# Patient Record
Sex: FEMALE | Race: WHITE | NOT HISPANIC OR LATINO | Employment: OTHER | URBAN - METROPOLITAN AREA
[De-identification: names, ages, dates, MRNs, and addresses within clinical notes are randomized per-mention and may not be internally consistent; named-entity substitution may affect disease eponyms.]

---

## 2017-04-19 ENCOUNTER — OFFICE VISIT (OUTPATIENT)
Dept: FAMILY MEDICINE CLINIC | Facility: CLINIC | Age: 72
End: 2017-04-19

## 2017-04-19 VITALS
SYSTOLIC BLOOD PRESSURE: 121 MMHG | BODY MASS INDEX: 30.22 KG/M2 | HEART RATE: 66 BPM | TEMPERATURE: 97.7 F | RESPIRATION RATE: 16 BRPM | HEIGHT: 64 IN | WEIGHT: 177 LBS | DIASTOLIC BLOOD PRESSURE: 77 MMHG

## 2017-04-19 DIAGNOSIS — I10 ESSENTIAL HYPERTENSION: ICD-10-CM

## 2017-04-19 PROCEDURE — 99213 OFFICE O/P EST LOW 20 MIN: CPT | Performed by: FAMILY MEDICINE

## 2017-04-19 RX ORDER — AMLODIPINE BESYLATE AND BENAZEPRIL HYDROCHLORIDE 5; 20 MG/1; MG/1
1 CAPSULE ORAL DAILY
Qty: 90 CAPSULE | Refills: 3 | Status: SHIPPED | OUTPATIENT
Start: 2017-04-19 | End: 2018-05-08 | Stop reason: SDUPTHER

## 2017-04-19 NOTE — PROGRESS NOTES
"Subjective   Minnie Ross is a 71 y.o. female.     History of Present Illness     Chief Complaint:   Chief Complaint   Patient presents with   • Hypertension     late / med refill -no labs        Minnie Ross 71 y.o. female who presents today for Medical Management of the below listed issues and medication refills.  she has a history of   Patient Active Problem List   Diagnosis   • Hypertension   • Hyperlipidemia   • Eczema   .  Since the last visit, she has overall felt well.  she has been compliant with   Current Outpatient Prescriptions:   •  amLODIPine-benazepril (LOTREL 5-20) 5-20 MG per capsule, Take 1 capsule by mouth Daily., Disp: 90 capsule, Rfl: 3  •  MULTIPLE VITAMINS-MINERALS ER PO, Take  by mouth., Disp: , Rfl: .  she denies medication side effects.    All of the chronic condition(s) listed above are stable w/o issues.    /77  Pulse 66  Temp 97.7 °F (36.5 °C) (Oral)   Resp 16  Ht 63.5\" (161.3 cm)  Wt 177 lb (80.3 kg)  BMI 30.86 kg/m2    Results for orders placed or performed in visit on 12/29/16   POC Urinalysis Dipstick   Result Value Ref Range    Color Yellow Yellow, Straw, Dark Yellow, Luann    Clarity, UA Clear Clear    Glucose, UA Negative Negative, 1000 mg/dL (3+) mg/dL    Bilirubin Negative Negative    Ketones, UA Negative Negative    Specific Gravity  1.010 1.005 - 1.030    Blood, UA Trace (A) Negative    pH, Urine 5.5 5.0 - 8.0    Protein, POC Negative Negative mg/dL    Urobilinogen, UA Normal Normal    Leukocytes Small (1+) (A) Negative    Nitrite, UA Negative Negative   IGP, Rfx Aptima HPV ASCU   Result Value Ref Range    Diagnosis Comment     Specimen adequacy: Comment     Clinician Provided ICD-10: Comment     Performed by: Comment     QC reviewed by: Comment     . .     Note: Comment     Method: CANCELED     Conv .conv Comment          The following portions of the patient's history were reviewed and updated as appropriate: allergies, current medications, past family history, " past medical history, past social history, past surgical history and problem list.    Review of Systems   Constitutional: Negative for activity change, chills, fatigue and fever.   Respiratory: Negative for cough and chest tightness.    Cardiovascular: Negative for chest pain and palpitations.   Gastrointestinal: Negative for abdominal pain and nausea.   Endocrine: Negative for cold intolerance.   Psychiatric/Behavioral: Negative for behavioral problems and dysphoric mood.       Objective   Physical Exam   Constitutional: She appears well-developed and well-nourished.   Neck: Neck supple. No thyromegaly present.   Cardiovascular: Normal rate and regular rhythm.    No murmur heard.  Pulmonary/Chest: Effort normal and breath sounds normal.   Abdominal: Bowel sounds are normal.   Psychiatric: She has a normal mood and affect. Her behavior is normal.   Nursing note and vitals reviewed.      Assessment/Plan   Minnie was seen today for hypertension.    Diagnoses and all orders for this visit:    Essential hypertension  -     amLODIPine-benazepril (LOTREL 5-20) 5-20 MG per capsule; Take 1 capsule by mouth Daily.

## 2017-11-07 ENCOUNTER — APPOINTMENT (OUTPATIENT)
Dept: WOMENS IMAGING | Facility: HOSPITAL | Age: 72
End: 2017-11-07

## 2017-11-07 PROCEDURE — G0202 SCR MAMMO BI INCL CAD: HCPCS | Performed by: RADIOLOGY

## 2017-11-07 PROCEDURE — 77063 BREAST TOMOSYNTHESIS BI: CPT | Performed by: RADIOLOGY

## 2017-11-09 ENCOUNTER — TELEPHONE (OUTPATIENT)
Dept: OBSTETRICS AND GYNECOLOGY | Facility: CLINIC | Age: 72
End: 2017-11-09

## 2017-11-09 NOTE — TELEPHONE ENCOUNTER
Progress Notes      Call pt:  Mammogram is normal/benign     Stable mass.      Osmany Ross MD

## 2018-01-10 ENCOUNTER — OFFICE VISIT (OUTPATIENT)
Dept: OBSTETRICS AND GYNECOLOGY | Facility: CLINIC | Age: 73
End: 2018-01-10

## 2018-01-10 VITALS
HEIGHT: 55 IN | DIASTOLIC BLOOD PRESSURE: 82 MMHG | WEIGHT: 173.2 LBS | BODY MASS INDEX: 40.08 KG/M2 | SYSTOLIC BLOOD PRESSURE: 122 MMHG

## 2018-01-10 DIAGNOSIS — M80.08XA AGE-RELATED OSTEOPOROSIS WITH CURRENT PATHOLOGICAL FRACTURE OF VERTEBRA, INITIAL ENCOUNTER (HCC): ICD-10-CM

## 2018-01-10 DIAGNOSIS — Z01.419 GYNECOLOGIC EXAM NORMAL: Primary | ICD-10-CM

## 2018-01-10 PROCEDURE — G0101 CA SCREEN;PELVIC/BREAST EXAM: HCPCS | Performed by: NURSE PRACTITIONER

## 2018-01-10 NOTE — PROGRESS NOTES
Minnie Ross is a 72 y.o. female.   Chief Complaint   Patient presents with   • Gynecologic Exam     Patient is here for a annual.      HPI:pt here for annual  Exam, voices no c/o    The following portions of the patient's history were reviewed and updated as appropriate: allergies, current medications, past family history, past medical history, past social history, past surgical history and problem list.    Review of Systems  Review of Systems   Constitutional: Negative.  Negative for unexpected weight change.   Respiratory: Negative for chest tightness and shortness of breath.    Cardiovascular: Negative for chest pain and palpitations.   Gastrointestinal: Negative for abdominal pain and blood in stool.   Endocrine: Negative.    Genitourinary: Negative for dyspareunia, dysuria, frequency, hematuria, menstrual problem, pelvic pain, vaginal bleeding, vaginal discharge and vaginal pain.   Musculoskeletal: Negative for joint swelling.   Skin: Negative for color change, rash and wound.   Allergic/Immunologic: Negative.    Psychiatric/Behavioral: Negative.    All other systems reviewed and are negative.      Objective   Physical Exam   Constitutional: She is oriented to person, place, and time. She appears well-developed and well-nourished.   HENT:   Head: Normocephalic.   Neck: Normal range of motion.   Cardiovascular: Normal rate and regular rhythm.    Pulmonary/Chest: Effort normal and breath sounds normal. Right breast exhibits no mass and no nipple discharge. Left breast exhibits no mass and no nipple discharge. There is no breast swelling.   Breasts soft without palpable masses   Abdominal: Soft. Bowel sounds are normal.   Genitourinary: Vagina normal and uterus normal. There is no rash or lesion on the right labia. There is no rash or lesion on the left labia. Cervix exhibits no friability. Right adnexum displays no mass, no tenderness and no fullness. Left adnexum displays no mass, no tenderness and no  fullness.   Genitourinary Comments: Ovaries  Within normal limits. Cervix  Within normal limits 2 small whtish area  Near introitus   Neurological: She is alert and oriented to person, place, and time.   Skin: Skin is warm and dry.   Psychiatric: She has a normal mood and affect. Her behavior is normal.   Vitals reviewed.      Assessment/Plan   Patient Instructions   Pt. Counseled today on safe sex practices, self breast examinations discussed. Colonoscopy recommended.  Bone Density Test recommended.  Hormone replacement therapy discussed. Aspirin prophylaxis to reduce risk of stroke.  Calcium and Vitamin D requirements discussed.   Diet and exercise also counseled.       Minnie was seen today for gynecologic exam.    Diagnoses and all orders for this visit:    Gynecologic exam normal        Return in about 6 months (around 7/10/2018).

## 2018-03-01 ENCOUNTER — TELEPHONE (OUTPATIENT)
Dept: FAMILY MEDICINE CLINIC | Facility: CLINIC | Age: 73
End: 2018-03-01

## 2018-03-01 DIAGNOSIS — I10 ESSENTIAL HYPERTENSION: Primary | ICD-10-CM

## 2018-03-01 DIAGNOSIS — E78.49 OTHER HYPERLIPIDEMIA: ICD-10-CM

## 2018-03-01 NOTE — TELEPHONE ENCOUNTER
----- Message from Silvia Smith sent at 3/1/2018 10:47 AM EST -----  Regarding: LAB ORDERS  Patient has f/u appt with Dr Nielson on 5/8/18 and she needs lab orders put in. Thanks

## 2018-03-26 ENCOUNTER — TRANSCRIBE ORDERS (OUTPATIENT)
Dept: ADMINISTRATIVE | Facility: HOSPITAL | Age: 73
End: 2018-03-26

## 2018-03-26 DIAGNOSIS — N95.1 SYMPTOMATIC MENOPAUSAL OR FEMALE CLIMACTERIC STATES: Primary | ICD-10-CM

## 2018-03-27 ENCOUNTER — HOSPITAL ENCOUNTER (OUTPATIENT)
Dept: BONE DENSITY | Facility: HOSPITAL | Age: 73
Discharge: HOME | End: 2018-03-27
Admitting: NURSE PRACTITIONER

## 2018-03-27 DIAGNOSIS — N95.1 SYMPTOMATIC MENOPAUSAL OR FEMALE CLIMACTERIC STATES: ICD-10-CM

## 2018-03-27 PROCEDURE — 77080 DXA BONE DENSITY AXIAL: CPT

## 2018-05-02 LAB
ALBUMIN SERPL-MCNC: 4.7 G/DL (ref 3.5–5.2)
ALBUMIN/GLOB SERPL: 1.7 G/DL
ALP SERPL-CCNC: 99 U/L (ref 39–117)
ALT SERPL-CCNC: 31 U/L (ref 1–33)
AST SERPL-CCNC: 31 U/L (ref 1–32)
BASOPHILS # BLD AUTO: 0.02 10*3/MM3 (ref 0–0.2)
BASOPHILS NFR BLD AUTO: 0.3 % (ref 0–1.5)
BILIRUB SERPL-MCNC: 0.5 MG/DL (ref 0.1–1.2)
BUN SERPL-MCNC: 18 MG/DL (ref 8–23)
BUN/CREAT SERPL: 20.7 (ref 7–25)
CALCIUM SERPL-MCNC: 10 MG/DL (ref 8.6–10.5)
CHLORIDE SERPL-SCNC: 104 MMOL/L (ref 98–107)
CHOLEST SERPL-MCNC: 207 MG/DL (ref 0–200)
CO2 SERPL-SCNC: 25.1 MMOL/L (ref 22–29)
CREAT SERPL-MCNC: 0.87 MG/DL (ref 0.57–1)
EOSINOPHIL # BLD AUTO: 0.11 10*3/MM3 (ref 0–0.7)
EOSINOPHIL NFR BLD AUTO: 1.7 % (ref 0.3–6.2)
ERYTHROCYTE [DISTWIDTH] IN BLOOD BY AUTOMATED COUNT: 13.1 % (ref 11.7–13)
GFR SERPLBLD CREATININE-BSD FMLA CKD-EPI: 64 ML/MIN/1.73
GFR SERPLBLD CREATININE-BSD FMLA CKD-EPI: 78 ML/MIN/1.73
GLOBULIN SER CALC-MCNC: 2.7 GM/DL
GLUCOSE SERPL-MCNC: 96 MG/DL (ref 65–99)
HCT VFR BLD AUTO: 44.6 % (ref 35.6–45.5)
HDLC SERPL-MCNC: 66 MG/DL (ref 40–60)
HGB BLD-MCNC: 14.6 G/DL (ref 11.9–15.5)
IMM GRANULOCYTES # BLD: 0.01 10*3/MM3 (ref 0–0.03)
IMM GRANULOCYTES NFR BLD: 0.2 % (ref 0–0.5)
LDLC SERPL CALC-MCNC: 125 MG/DL (ref 0–100)
LDLC/HDLC SERPL: 1.89 {RATIO}
LYMPHOCYTES # BLD AUTO: 2.27 10*3/MM3 (ref 0.9–4.8)
LYMPHOCYTES NFR BLD AUTO: 35.7 % (ref 19.6–45.3)
MCH RBC QN AUTO: 31.9 PG (ref 26.9–32)
MCHC RBC AUTO-ENTMCNC: 32.7 G/DL (ref 32.4–36.3)
MCV RBC AUTO: 97.6 FL (ref 80.5–98.2)
MONOCYTES # BLD AUTO: 0.52 10*3/MM3 (ref 0.2–1.2)
MONOCYTES NFR BLD AUTO: 8.2 % (ref 5–12)
NEUTROPHILS # BLD AUTO: 3.44 10*3/MM3 (ref 1.9–8.1)
NEUTROPHILS NFR BLD AUTO: 54.1 % (ref 42.7–76)
PLATELET # BLD AUTO: 363 10*3/MM3 (ref 140–500)
POTASSIUM SERPL-SCNC: 4.9 MMOL/L (ref 3.5–5.2)
PROT SERPL-MCNC: 7.4 G/DL (ref 6–8.5)
RBC # BLD AUTO: 4.57 10*6/MM3 (ref 3.9–5.2)
SODIUM SERPL-SCNC: 143 MMOL/L (ref 136–145)
TRIGL SERPL-MCNC: 82 MG/DL (ref 0–150)
TSH SERPL DL<=0.005 MIU/L-ACNC: 1.17 MIU/ML (ref 0.27–4.2)
VLDLC SERPL CALC-MCNC: 16.4 MG/DL (ref 5–40)
WBC # BLD AUTO: 6.36 10*3/MM3 (ref 4.5–10.7)

## 2018-05-08 NOTE — PROGRESS NOTES
"Subjective   Minnie Ross is a 72 y.o. female.     History of Present Illness     Chief Complaint:   Chief Complaint   Patient presents with   • Other     MEDICARE WELLNESS   • Hypertension     med refill - lab results       Minnie Ross 72 y.o. female who presents today for Medical Management of the below listed issues and medication refills.  she has a problem list of   Patient Active Problem List   Diagnosis   • Hypertension   • Hyperlipidemia   • Eczema   .  Since the last visit, she has overall felt well.  she has been compliant with   Current Outpatient Prescriptions:   •  amLODIPine-benazepril (LOTREL 5-20) 5-20 MG per capsule, Take 1 capsule by mouth Daily., Disp: 90 capsule, Rfl: 3  •  BIOTIN PO, Take  by mouth., Disp: , Rfl:   •  meloxicam (MOBIC) 15 MG tablet, , Disp: , Rfl:   •  MULTIPLE VITAMINS-MINERALS ER PO, Take  by mouth., Disp: , Rfl:   •  TURMERIC PO, Take  by mouth., Disp: , Rfl: .  she denies medication side effects.    All of the chronic condition(s) listed above are stable w/o issues.    /78   Pulse 66   Temp 97.6 °F (36.4 °C) (Oral)   Resp 16   Ht 160 cm (63\")   Wt 81.2 kg (179 lb)   BMI 31.71 kg/m²     Results for orders placed or performed in visit on 03/01/18   Comprehensive metabolic panel   Result Value Ref Range    Glucose 96 65 - 99 mg/dL    BUN 18 8 - 23 mg/dL    Creatinine 0.87 0.57 - 1.00 mg/dL    eGFR Non African Am 64 >60 mL/min/1.73    eGFR African Am 78 >60 mL/min/1.73    BUN/Creatinine Ratio 20.7 7.0 - 25.0    Sodium 143 136 - 145 mmol/L    Potassium 4.9 3.5 - 5.2 mmol/L    Chloride 104 98 - 107 mmol/L    Total CO2 25.1 22.0 - 29.0 mmol/L    Calcium 10.0 8.6 - 10.5 mg/dL    Total Protein 7.4 6.0 - 8.5 g/dL    Albumin 4.70 3.50 - 5.20 g/dL    Globulin 2.7 gm/dL    A/G Ratio 1.7 g/dL    Total Bilirubin 0.5 0.1 - 1.2 mg/dL    Alkaline Phosphatase 99 39 - 117 U/L    AST (SGOT) 31 1 - 32 U/L    ALT (SGPT) 31 1 - 33 U/L   Lipid Panel With LDL/HDL Ratio   Result " Value Ref Range    Total Cholesterol 207 (H) 0 - 200 mg/dL    Triglycerides 82 0 - 150 mg/dL    HDL Cholesterol 66 (H) 40 - 60 mg/dL    VLDL Cholesterol 16.4 5 - 40 mg/dL    LDL Cholesterol  125 (H) 0 - 100 mg/dL    LDL/HDL Ratio 1.89    TSH   Result Value Ref Range    TSH 1.170 0.270 - 4.200 mIU/mL   CBC and Differential   Result Value Ref Range    WBC 6.36 4.50 - 10.70 10*3/mm3    RBC 4.57 3.90 - 5.20 10*6/mm3    Hemoglobin 14.6 11.9 - 15.5 g/dL    Hematocrit 44.6 35.6 - 45.5 %    MCV 97.6 80.5 - 98.2 fL    MCH 31.9 26.9 - 32.0 pg    MCHC 32.7 32.4 - 36.3 g/dL    RDW 13.1 (H) 11.7 - 13.0 %    Platelets 363 140 - 500 10*3/mm3    Neutrophil Rel % 54.1 42.7 - 76.0 %    Lymphocyte Rel % 35.7 19.6 - 45.3 %    Monocyte Rel % 8.2 5.0 - 12.0 %    Eosinophil Rel % 1.7 0.3 - 6.2 %    Basophil Rel % 0.3 0.0 - 1.5 %    Neutrophils Absolute 3.44 1.90 - 8.10 10*3/mm3    Lymphocytes Absolute 2.27 0.90 - 4.80 10*3/mm3    Monocytes Absolute 0.52 0.20 - 1.20 10*3/mm3    Eosinophils Absolute 0.11 0.00 - 0.70 10*3/mm3    Basophils Absolute 0.02 0.00 - 0.20 10*3/mm3    Immature Granulocyte Rel % 0.2 0.0 - 0.5 %    Immature Grans Absolute 0.01 0.00 - 0.03 10*3/mm3           The following portions of the patient's history were reviewed and updated as appropriate: allergies, current medications, past family history, past medical history, past social history, past surgical history and problem list.    Review of Systems   Constitutional: Negative for activity change, chills, fatigue and fever.   Respiratory: Negative for cough and chest tightness.    Cardiovascular: Negative for chest pain and palpitations.   Gastrointestinal: Negative for abdominal pain and nausea.   Endocrine: Negative for cold intolerance.   Psychiatric/Behavioral: Negative for behavioral problems and dysphoric mood.       Objective   Physical Exam   Constitutional: She appears well-developed and well-nourished.   Neck: Neck supple. No thyromegaly present.    Cardiovascular: Normal rate and regular rhythm.    No murmur heard.  Pulmonary/Chest: Effort normal and breath sounds normal.   Abdominal: Bowel sounds are normal.   Psychiatric: She has a normal mood and affect. Her behavior is normal.   Nursing note and vitals reviewed.  Labs reviewed with pt today during visit. All questions answered.      Assessment/Plan   Minnie was seen today for other and hypertension.    Diagnoses and all orders for this visit:    Medicare annual wellness visit, subsequent    Essential hypertension  -     amLODIPine-benazepril (LOTREL 5-20) 5-20 MG per capsule; Take 1 capsule by mouth Daily.

## 2018-05-09 ENCOUNTER — OFFICE VISIT (OUTPATIENT)
Dept: FAMILY MEDICINE CLINIC | Facility: CLINIC | Age: 73
End: 2018-05-09

## 2018-05-09 VITALS
RESPIRATION RATE: 16 BRPM | HEIGHT: 63 IN | BODY MASS INDEX: 31.71 KG/M2 | SYSTOLIC BLOOD PRESSURE: 126 MMHG | DIASTOLIC BLOOD PRESSURE: 78 MMHG | HEART RATE: 66 BPM | WEIGHT: 179 LBS | TEMPERATURE: 97.6 F

## 2018-05-09 DIAGNOSIS — Z00.00 MEDICARE ANNUAL WELLNESS VISIT, SUBSEQUENT: Primary | ICD-10-CM

## 2018-05-09 DIAGNOSIS — I10 ESSENTIAL HYPERTENSION: ICD-10-CM

## 2018-05-09 PROCEDURE — 99213 OFFICE O/P EST LOW 20 MIN: CPT | Performed by: FAMILY MEDICINE

## 2018-05-09 PROCEDURE — G0439 PPPS, SUBSEQ VISIT: HCPCS | Performed by: FAMILY MEDICINE

## 2018-05-09 RX ORDER — MELOXICAM 15 MG/1
TABLET ORAL
COMMUNITY
Start: 2018-04-20 | End: 2019-05-07 | Stop reason: SDUPTHER

## 2018-05-09 RX ORDER — AMLODIPINE BESYLATE AND BENAZEPRIL HYDROCHLORIDE 5; 20 MG/1; MG/1
1 CAPSULE ORAL DAILY
Qty: 90 CAPSULE | Refills: 3 | Status: SHIPPED | OUTPATIENT
Start: 2018-05-09 | End: 2019-05-07 | Stop reason: SDUPTHER

## 2018-05-09 NOTE — PROGRESS NOTES
QUICK REFERENCE INFORMATION:  The ABCs of the Annual Wellness Visit    Subsequent Medicare Wellness Visit    HEALTH RISK ASSESSMENT    1945    Recent Hospitalizations:  No hospitalization(s) within the last year..        Current Medical Providers:  Patient Care Team:  Andriy Nielson MD as PCP - General (Family Medicine)  Minnie Colindres MD as PCP - Claims Attributed        Smoking Status:  History   Smoking Status   • Former Smoker   Smokeless Tobacco   • Never Used       Alcohol Consumption:  History   Alcohol Use   • Yes     Comment: occasionally       Depression Screen:   PHQ-2/PHQ-9 Depression Screening 5/9/2018   Little interest or pleasure in doing things 0   Feeling down, depressed, or hopeless 0   Trouble falling or staying asleep, or sleeping too much -   Feeling tired or having little energy -   Poor appetite or overeating -   Feeling bad about yourself - or that you are a failure or have let yourself or your family down -   Trouble concentrating on things, such as reading the newspaper or watching television -   Moving or speaking so slowly that other people could have noticed. Or the opposite - being so fidgety or restless that you have been moving around a lot more than usual -   Thoughts that you would be better off dead, or of hurting yourself in some way -   Total Score 0       Health Habits and Functional and Cognitive Screening:  Functional & Cognitive Status 5/9/2018   Do you have difficulty preparing food and eating? No   Do you have difficulty bathing yourself, getting dressed or grooming yourself? No   Do you have difficulty using the toilet? No   Do you have difficulty moving around from place to place? No   Do you have trouble with steps or getting out of a bed or a chair? No   In the past year have you fallen or experienced a near fall? No   Current Diet Well Balanced Diet   Dental Exam Up to date   Eye Exam Up to date   Exercise (times per week) 3 times per week   Current Exercise  Activities Include Stationary Bicycling/Spin Class   Do you need help using the phone?  No   Are you deaf or do you have serious difficulty hearing?  No   Do you need help with transportation? No   Do you need help shopping? No   Do you need help preparing meals?  No   Do you need help with housework?  No   Do you need help with laundry? No   Do you need help taking your medications? No   Do you need help managing money? No   Do you ever drive or ride in a car without wearing a seat belt? No   Have you felt unusual stress, anger or loneliness in the last month? No   Who do you live with? Spouse   If you need help, do you have trouble finding someone available to you? Yes   Have you been bothered in the last four weeks by sexual problems? No   Do you have difficulty concentrating, remembering or making decisions? No           Does the patient have evidence of cognitive impairment? No    Aspirin use counseling: Does not need ASA (and currently is not on it)      Recent Lab Results:  CMP:  Lab Results   Component Value Date    GLU 96 05/02/2018    BUN 18 05/02/2018    CREATININE 0.87 05/02/2018    EGFRIFNONA 64 05/02/2018    EGFRIFAFRI 78 05/02/2018    BCR 20.7 05/02/2018     05/02/2018    K 4.9 05/02/2018    CO2 25.1 05/02/2018    CALCIUM 10.0 05/02/2018    PROTENTOTREF 7.4 05/02/2018    ALBUMIN 4.70 05/02/2018    LABGLOBREF 2.7 05/02/2018    LABIL2 1.7 05/02/2018    BILITOT 0.5 05/02/2018    ALKPHOS 99 05/02/2018    AST 31 05/02/2018    ALT 31 05/02/2018     Lipid Panel:  Lab Results   Component Value Date    TRIG 82 05/02/2018    HDL 66 (H) 05/02/2018    VLDL 16.4 05/02/2018    LDLHDL 1.89 05/02/2018     HbA1c:       Visual Acuity:  No exam data present    Age-appropriate Screening Schedule:  Refer to the list below for future screening recommendations based on patient's age, sex and/or medical conditions. Orders for these recommended tests are listed in the plan section. The patient has been provided with a  "written plan.    Health Maintenance   Topic Date Due   • PNEUMOCOCCAL VACCINES (65+ LOW/MEDIUM RISK) (2 of 2 - PPSV23) 10/29/2016   • INFLUENZA VACCINE  08/01/2018   • LIPID PANEL  05/02/2019   • MAMMOGRAM  11/07/2019   • PAP SMEAR  01/10/2020   • COLONOSCOPY  03/02/2020   • DXA SCAN  03/27/2020   • TDAP/TD VACCINES (2 - Td) 05/01/2022   • ZOSTER VACCINE  Completed        Subjective   History of Present Illness    Minnie Ross is a 72 y.o. female who presents for an Subsequent Wellness Visit.    The following portions of the patient's history were reviewed and updated as appropriate: allergies, current medications, past family history, past medical history, past social history, past surgical history and problem list.    Outpatient Medications Prior to Visit   Medication Sig Dispense Refill   • amLODIPine-benazepril (LOTREL 5-20) 5-20 MG per capsule Take 1 capsule by mouth Daily. 90 capsule 3   • BIOTIN PO Take  by mouth.     • MULTIPLE VITAMINS-MINERALS ER PO Take  by mouth.     • TURMERIC PO Take  by mouth.       No facility-administered medications prior to visit.        Patient Active Problem List   Diagnosis   • Hypertension   • Hyperlipidemia   • Eczema       Advance Care Planning:  has an advance directive - a copy HAS NOT been provided    Identification of Risk Factors:  Risk factors include: cardiovascular risk.    Review of Systems    Compared to one year ago, the patient feels her physical health is the same.  Compared to one year ago, the patient feels her mental health is the same.    Objective     Physical Exam    Vitals:    05/09/18 1002   BP: 126/78   Pulse: 66   Resp: 16   Temp: 97.6 °F (36.4 °C)   TempSrc: Oral   Weight: 81.2 kg (179 lb)   Height: 160 cm (63\")   PainSc: 0-No pain       Patient's Body mass index is 31.71 kg/m². BMI is above normal parameters. Recommendations include: exercise counseling and nutrition counseling.      Assessment/Plan   Patient Self-Management and Personalized " Health Advice  The patient has been provided with information about: diet, exercise and weight management and preventive services including:   · Exercise counseling provided, Fall Risk assessment done, Nutrition counseling provided.    Visit Diagnoses:    ICD-10-CM ICD-9-CM   1. Medicare annual wellness visit, subsequent Z00.00 V70.0   2. Essential hypertension I10 401.9       No orders of the defined types were placed in this encounter.      Outpatient Encounter Prescriptions as of 5/9/2018   Medication Sig Dispense Refill   • amLODIPine-benazepril (LOTREL 5-20) 5-20 MG per capsule Take 1 capsule by mouth Daily. 90 capsule 3   • BIOTIN PO Take  by mouth.     • MULTIPLE VITAMINS-MINERALS ER PO Take  by mouth.     • TURMERIC PO Take  by mouth.       No facility-administered encounter medications on file as of 5/9/2018.        Reviewed use of high risk medication in the elderly: not applicable  Reviewed for potential of harmful drug interactions in the elderly: not applicable    Follow Up:  No Follow-up on file.     An After Visit Summary and PPPS with all of these plans were given to the patient.

## 2018-05-09 NOTE — PATIENT INSTRUCTIONS
Medicare Wellness  Personal Prevention Plan of Service     Date of Office Visit:  2018  Encounter Provider:  Andriy Nielson MD  Place of Service:  [facility]  Patient Name: Minnie Ross  :  1945    As part of the Medicare Wellness portion of your visit today, we are providing you with this personalized preventive plan of services (PPPS). This plan is based upon recommendations of the [place] Preventive Services Task Force (USPSTF) and the Advisory Committee on Immunization Practices (ACIP).    This lists the preventive care services that should be considered, and provides dates of when you are due. Items listed as completed are up-to-date and do not require any further intervention.    Health Maintenance   Topic Date Due   • PNEUMOCOCCAL VACCINES (65+ LOW/MEDIUM RISK) (2 of 2 - PPSV23) 10/29/2016   • INFLUENZA VACCINE  2018   • LIPID PANEL  2019   • MEDICARE ANNUAL WELLNESS  2019   • MAMMOGRAM  2019   • PAP SMEAR  01/10/2020   • COLONOSCOPY  2020   • DXA SCAN  2020   • TDAP/TD VACCINES (2 - Td) 2022   • ZOSTER VACCINE  Completed   • HEPATITIS C SCREENING  Excluded       No orders of the defined types were placed in this encounter.      No Follow-up on file.

## 2018-08-07 ENCOUNTER — OFFICE VISIT (OUTPATIENT)
Dept: OBSTETRICS AND GYNECOLOGY | Facility: CLINIC | Age: 73
End: 2018-08-07

## 2018-08-07 VITALS
WEIGHT: 179 LBS | BODY MASS INDEX: 31.71 KG/M2 | DIASTOLIC BLOOD PRESSURE: 70 MMHG | SYSTOLIC BLOOD PRESSURE: 120 MMHG | HEIGHT: 63 IN

## 2018-08-07 DIAGNOSIS — Z01.419 NORMAL PELVIC EXAM: Primary | ICD-10-CM

## 2018-08-07 PROCEDURE — 99213 OFFICE O/P EST LOW 20 MIN: CPT | Performed by: NURSE PRACTITIONER

## 2018-08-07 NOTE — PROGRESS NOTES
[facility]  Routine Annual Visit    2018    Patient: Minnie Ross          MR#:[id number]      History of Present Illness    73 y.o. female  who presents for 6 month f/u as Daksha Carcamo advised for two ? White areas near introitus. Minnie is unclear exactly what Daksha saw and her note is vague. Pelvic exam will be done today to ensure no abnormalities. Minnie is completely asymptomatic- denies vaginal pain and itching. No complaints today.    No LMP recorded. Patient is postmenopausal.  Obstetric History:  OB History      Para Term  AB Living    1 1 1     1    SAB TAB Ectopic Molar Multiple Live Births              1         Menstrual History:     No LMP recorded. Patient is postmenopausal.       Sexual History:       ________________________________________  Patient Active Problem List   Diagnosis   • Hypertension   • Hyperlipidemia   • Eczema       Past Medical History:   Diagnosis Date   • Abnormal Pap smear of cervix    • H/O complete eye exam 2018   • History of blood transfusion    • History of bone density study 2018   • Hypertension    • IBS (irritable bowel syndrome)        Past Surgical History:   Procedure Laterality Date   • APPENDECTOMY     • CHOLECYSTECTOMY     • KNEE SURGERY     • MAMMO BILATERAL     • PAP SMEAR  2016       History   Smoking Status   • Former Smoker   Smokeless Tobacco   • Never Used       Family History   Problem Relation Age of Onset   • Hypertension Mother    • Heart disease Mother    • Deep vein thrombosis Brother        Prior to Admission medications    Medication Sig Start Date End Date Taking? Authorizing Provider   amLODIPine-benazepril (LOTREL 5-20) 5-20 MG per capsule Take 1 capsule by mouth Daily. 18  Yes Andriy Nielson MD   BIOTIN PO Take  by mouth.   Yes ProviderSkyler MD   meloxicam (MOBIC) 15 MG tablet  18  Yes ProviderSkyler MD   MULTIPLE VITAMINS-MINERALS ER PO Take  by mouth.   Yes  "Provider, Historical, MD   TURMERIC PO Take  by mouth.   Yes Provider, MD Skyler     ________________________________________    The following portions of the patient's history were reviewed and updated as appropriate: allergies, current medications, past family history, past medical history, past social history, past surgical history and problem list.    Review of Systems   Constitutional: Negative for chills, fatigue and fever.   Gastrointestinal: Negative for abdominal distention and abdominal pain.   Genitourinary: Negative for decreased urine volume, difficulty urinating, dysuria, frequency, genital sores, hematuria, menstrual problem, pelvic pain, urgency, vaginal bleeding, vaginal discharge and vaginal pain.       Objective   Physical Exam    /70   Ht 160 cm (62.99\")   Wt 81.2 kg (179 lb)   BMI 31.72 kg/m²    BP Readings from Last 3 Encounters:   08/07/18 120/70   05/09/18 126/78   01/10/18 122/82      Wt Readings from Last 3 Encounters:   08/07/18 81.2 kg (179 lb)   05/09/18 81.2 kg (179 lb)   01/10/18 78.6 kg (173 lb 3.2 oz)        BMI: Estimated body mass index is 31.72 kg/m² as calculated from the following:    Height as of this encounter: 160 cm (62.99\").    Weight as of this encounter: 81.2 kg (179 lb).      Vulva: normal   Vagina: normal mucosa, normal discharge   Cervix: no cervical motion tenderness and no lesions   Uterus: normal size, mobile, non-tender or normal shape and consistency   Adnexa: no mass, fullness, tenderness     Assessment:    1. Exam totally normal today. No white areas or areas of concern noted at all on vulva, labia, vagina, or cervix. Pt reassured. Follow up in January for annual exam or sooner for problems. Minnie verbalizes understanding and is happy with this plan.    Plan:    []  Rx:   []  Mammogram request made  []  PAP done  []  Occult fecal blood test (Insure)  []  Labs:   []  GC/Chl/TV  []  DEXA scan   []  Referral for colonoscopy:           Tammie GUIDRY" GABRIELE Walsh  8/7/2018 10:02 AM

## 2018-12-11 ENCOUNTER — APPOINTMENT (OUTPATIENT)
Dept: WOMENS IMAGING | Facility: HOSPITAL | Age: 73
End: 2018-12-11

## 2018-12-11 PROCEDURE — 77063 BREAST TOMOSYNTHESIS BI: CPT | Performed by: RADIOLOGY

## 2018-12-11 PROCEDURE — 77067 SCR MAMMO BI INCL CAD: CPT | Performed by: RADIOLOGY

## 2019-01-15 ENCOUNTER — OFFICE VISIT (OUTPATIENT)
Dept: OBSTETRICS AND GYNECOLOGY | Age: 74
End: 2019-01-15

## 2019-01-15 VITALS
SYSTOLIC BLOOD PRESSURE: 120 MMHG | WEIGHT: 179.4 LBS | HEIGHT: 63 IN | DIASTOLIC BLOOD PRESSURE: 72 MMHG | BODY MASS INDEX: 31.79 KG/M2

## 2019-01-15 DIAGNOSIS — Z01.419 WELL WOMAN EXAM WITH ROUTINE GYNECOLOGICAL EXAM: Primary | ICD-10-CM

## 2019-01-15 PROCEDURE — G0101 CA SCREEN;PELVIC/BREAST EXAM: HCPCS | Performed by: NURSE PRACTITIONER

## 2019-01-15 RX ORDER — INFLUENZA A VIRUS A/MICHIGAN/45/2015 X-275 (H1N1) ANTIGEN (FORMALDEHYDE INACTIVATED), INFLUENZA A VIRUS A/SINGAPORE/INFIMH-16-0019/2016 IVR-186 (H3N2) ANTIGEN (FORMALDEHYDE INACTIVATED), AND INFLUENZA B VIRUS B/MARYLAND/15/2016 BX-69A (A B/COLORADO/6/2017-LIKE VIRUS) ANTIGEN (FORMALDEHYDE INACTIVATED) 60; 60; 60 UG/.5ML; UG/.5ML; UG/.5ML
INJECTION, SUSPENSION INTRAMUSCULAR
Refills: 0 | COMMUNITY
Start: 2018-12-06 | End: 2019-05-07

## 2019-01-15 NOTE — PROGRESS NOTES
[facility], [place], operated by [facility]  Routine Annual Visit    1/15/2019    Patient: Minnie Ross          MR#:[id number]      History of Present Illness    73 y.o. female  who presents for breast/pelvic check. Former patient of Dr. Parr. Pap negative 2016. Benign mammogram 2018 at [facility]. Osteopenia on dexa 3/2018- calcium and vit D discussed. Minnie is postmenopausal- no bleeding. No HRT. She reports she is up to date on colonoscopy. Sees Dr. Nielson for primary care. No complaints today.     No LMP recorded (lmp unknown). Patient is postmenopausal.  Obstetric History:  OB History      Para Term  AB Living    1 1 1     1    SAB TAB Ectopic Molar Multiple Live Births              1         Menstrual History:     No LMP recorded (lmp unknown). Patient is postmenopausal.       Sexual History:       ________________________________________  Patient Active Problem List   Diagnosis   • Hypertension   • Hyperlipidemia   • Eczema       Past Medical History:   Diagnosis Date   • Abnormal Pap smear of cervix    • H/O complete eye exam 2018   • History of blood transfusion    • History of bone density study 2018   • Hypertension    • IBS (irritable bowel syndrome)        Past Surgical History:   Procedure Laterality Date   • APPENDECTOMY     • CHOLECYSTECTOMY     • KNEE SURGERY     • MAMMO BILATERAL     • PAP SMEAR  2016       Social History     Tobacco Use   Smoking Status Former Smoker   Smokeless Tobacco Never Used       Family History   Problem Relation Age of Onset   • Hypertension Mother    • Heart disease Mother    • Deep vein thrombosis Brother        Prior to Admission medications    Medication Sig Start Date End Date Taking? Authorizing Provider   amLODIPine-benazepril (LOTREL 5-20) 5-20 MG per capsule Take 1 capsule by mouth Daily. 18  Yes Andriy Nielson MD   BIOTIN PO Take  by mouth.   Yes ProviderSkyler MD   FLUZONE HIGH-DOSE 0.5 ML suspension prefilled syringe  "injection ADM 0.5ML IM UTD 12/6/18  Yes Skyler Fitzgerald MD   meloxicam (MOBIC) 15 MG tablet  4/20/18  Yes Skyler Fitzgerald MD   Multiple Vitamins-Minerals (ICAPS AREDS 2 PO) Take  by mouth.   Yes Skyler Fitzgerald MD   MULTIPLE VITAMINS-MINERALS ER PO Take  by mouth.   Yes Skyler Fitzgerald MD   TURMERIC PO Take  by mouth.   Yes Skyler Fitzgerald MD       The following portions of the patient's history were reviewed and updated as appropriate: allergies, current medications, past family history, past medical history, past social history, past surgical history and problem list.    Review of Systems   Constitutional: Negative.    HENT: Negative.    Eyes: Negative for visual disturbance.   Respiratory: Negative for cough, shortness of breath and wheezing.    Cardiovascular: Negative for chest pain, palpitations and leg swelling.   Gastrointestinal: Negative for abdominal distention, abdominal pain, blood in stool, constipation, diarrhea, nausea and vomiting.   Endocrine: Negative for cold intolerance and heat intolerance.   Genitourinary: Negative for difficulty urinating, dyspareunia, dysuria, frequency, genital sores, hematuria, menstrual problem, pelvic pain, urgency, vaginal bleeding, vaginal discharge and vaginal pain.   Musculoskeletal: Negative.    Skin: Negative.    Neurological: Negative for dizziness, weakness, light-headedness, numbness and headaches.   Hematological: Negative.    Psychiatric/Behavioral: Negative.    Breasts: negative for lumps skin changes, dimpling, swelling, nipple changes/discharge bilaterally       Objective   Physical Exam    /72   Ht 160 cm (63\")   Wt 81.4 kg (179 lb 6.4 oz)   LMP  (LMP Unknown)   Breastfeeding? No   BMI 31.78 kg/m²    BP Readings from Last 3 Encounters:   01/15/19 120/72   08/07/18 120/70   05/09/18 126/78      Wt Readings from Last 3 Encounters:   01/15/19 81.4 kg (179 lb 6.4 oz)   08/07/18 81.2 kg (179 lb)   05/09/18 81.2 kg (179 " "lb)        BMI: Estimated body mass index is 31.78 kg/m² as calculated from the following:    Height as of this encounter: 160 cm (63\").    Weight as of this encounter: 81.4 kg (179 lb 6.4 oz).            General:   alert, appears stated age and cooperative   Heart: regular rate and rhythm, S1, S2 normal, no murmur, click, rub or gallop   Lungs: clear to auscultation bilaterally   Abdomen: soft, non-tender, without masses or organomegaly   Breast: inspection negative, no nipple discharge or bleeding, no masses or nodularity palpable   Vulva: normal   Vagina: normal mucosa, normal discharge, atrophic appearance    Cervix: no cervical motion tenderness and no lesions   Uterus: normal size, mobile, non-tender or normal shape and consistency   Adnexa: no mass, fullness, tenderness     Assessment:    1. Normal annual exam   2. Healthy lifestyle modifications discussed, counseled on self breast exams and bone health      Plan:    []  Rx:   []  Mammogram request made  []  PAP done  []  Occult fecal blood test (Ins[facility])  []  Labs:   []  GC/Chl/TV  []  DEXA scan   []  Referral for colonoscopy:           Tammie Walsh, APRN  1/15/2019 10:49 AM  "

## 2019-01-16 LAB
CONV .: NORMAL
CYTOLOGIST CVX/VAG CYTO: NORMAL
CYTOLOGY CVX/VAG DOC THIN PREP: NORMAL
DX ICD CODE: NORMAL
HIV 1 & 2 AB SER-IMP: NORMAL
OTHER STN SPEC: NORMAL
PATH REPORT.FINAL DX SPEC: NORMAL
STAT OF ADQ CVX/VAG CYTO-IMP: NORMAL

## 2019-01-18 ENCOUNTER — TELEPHONE (OUTPATIENT)
Dept: OBSTETRICS AND GYNECOLOGY | Age: 74
End: 2019-01-18

## 2019-01-18 NOTE — TELEPHONE ENCOUNTER
----- Message from GABRIELE Andrew sent at 1/17/2019  8:47 AM EST -----  Please inform patient her pap smear returned negative (normal). Thank you.

## 2019-01-22 ENCOUNTER — TELEPHONE (OUTPATIENT)
Dept: OBSTETRICS AND GYNECOLOGY | Age: 74
End: 2019-01-22

## 2019-04-24 ENCOUNTER — TELEPHONE (OUTPATIENT)
Dept: FAMILY MEDICINE CLINIC | Facility: CLINIC | Age: 74
End: 2019-04-24

## 2019-04-24 DIAGNOSIS — E78.49 OTHER HYPERLIPIDEMIA: Primary | ICD-10-CM

## 2019-04-24 DIAGNOSIS — I10 ESSENTIAL HYPERTENSION: ICD-10-CM

## 2019-04-24 NOTE — TELEPHONE ENCOUNTER
----- Message from Anabella Hackett sent at 4/24/2019 10:32 AM EDT -----  Regarding: LAB ORDERS  PLEASE PUT IN 1 YR LABS FROM [facility]. THANKS

## 2019-05-03 LAB
ALBUMIN SERPL-MCNC: 4.8 G/DL (ref 3.5–5.2)
ALBUMIN/GLOB SERPL: 1.5 G/DL
ALP SERPL-CCNC: 105 U/L (ref 39–117)
ALT SERPL-CCNC: 23 U/L (ref 1–33)
AST SERPL-CCNC: 25 U/L (ref 1–32)
BASOPHILS # BLD AUTO: 0.05 10*3/MM3 (ref 0–0.2)
BASOPHILS NFR BLD AUTO: 0.7 % (ref 0–1.5)
BILIRUB SERPL-MCNC: 0.5 MG/DL (ref 0.2–1.2)
BUN SERPL-MCNC: 14 MG/DL (ref 8–23)
BUN/CREAT SERPL: 18.4 (ref 7–25)
CALCIUM SERPL-MCNC: 10.5 MG/DL (ref 8.6–10.5)
CHLORIDE SERPL-SCNC: 101 MMOL/L (ref 98–107)
CHOLEST SERPL-MCNC: 201 MG/DL (ref 0–200)
CO2 SERPL-SCNC: 27.2 MMOL/L (ref 22–29)
CREAT SERPL-MCNC: 0.76 MG/DL (ref 0.57–1)
EOSINOPHIL # BLD AUTO: 0.13 10*3/MM3 (ref 0–0.4)
EOSINOPHIL NFR BLD AUTO: 1.9 % (ref 0.3–6.2)
ERYTHROCYTE [DISTWIDTH] IN BLOOD BY AUTOMATED COUNT: 12.8 % (ref 12.3–15.4)
GLOBULIN SER CALC-MCNC: 3.1 GM/DL
GLUCOSE SERPL-MCNC: 97 MG/DL (ref 65–99)
HCT VFR BLD AUTO: 47.5 % (ref 34–46.6)
HDLC SERPL-MCNC: 69 MG/DL (ref 40–60)
HGB BLD-MCNC: 15 G/DL (ref 12–15.9)
IMM GRANULOCYTES # BLD AUTO: 0.01 10*3/MM3 (ref 0–0.05)
IMM GRANULOCYTES NFR BLD AUTO: 0.1 % (ref 0–0.5)
LDLC SERPL CALC-MCNC: 118 MG/DL (ref 0–100)
LDLC/HDLC SERPL: 1.7 {RATIO}
LYMPHOCYTES # BLD AUTO: 2.16 10*3/MM3 (ref 0.7–3.1)
LYMPHOCYTES NFR BLD AUTO: 30.8 % (ref 19.6–45.3)
MCH RBC QN AUTO: 30.9 PG (ref 26.6–33)
MCHC RBC AUTO-ENTMCNC: 31.6 G/DL (ref 31.5–35.7)
MCV RBC AUTO: 97.7 FL (ref 79–97)
MONOCYTES # BLD AUTO: 0.59 10*3/MM3 (ref 0.1–0.9)
MONOCYTES NFR BLD AUTO: 8.4 % (ref 5–12)
NEUTROPHILS # BLD AUTO: 4.08 10*3/MM3 (ref 1.7–7)
NEUTROPHILS NFR BLD AUTO: 58.1 % (ref 42.7–76)
NRBC BLD AUTO-RTO: 0 /100 WBC (ref 0–0.2)
PLATELET # BLD AUTO: 436 10*3/MM3 (ref 140–450)
POTASSIUM SERPL-SCNC: 4.9 MMOL/L (ref 3.5–5.2)
PROT SERPL-MCNC: 7.9 G/DL (ref 6–8.5)
RBC # BLD AUTO: 4.86 10*6/MM3 (ref 3.77–5.28)
SODIUM SERPL-SCNC: 140 MMOL/L (ref 136–145)
TRIGL SERPL-MCNC: 72 MG/DL (ref 0–150)
TSH SERPL DL<=0.005 MIU/L-ACNC: 1.96 MIU/ML (ref 0.27–4.2)
VLDLC SERPL CALC-MCNC: 14.4 MG/DL
WBC # BLD AUTO: 7.02 10*3/MM3 (ref 3.4–10.8)

## 2019-05-07 ENCOUNTER — OFFICE VISIT (OUTPATIENT)
Dept: FAMILY MEDICINE CLINIC | Facility: CLINIC | Age: 74
End: 2019-05-07

## 2019-05-07 VITALS
TEMPERATURE: 98.2 F | HEART RATE: 66 BPM | HEIGHT: 63 IN | SYSTOLIC BLOOD PRESSURE: 123 MMHG | BODY MASS INDEX: 31.36 KG/M2 | WEIGHT: 177 LBS | RESPIRATION RATE: 14 BRPM | DIASTOLIC BLOOD PRESSURE: 72 MMHG

## 2019-05-07 DIAGNOSIS — M19.91 PRIMARY OSTEOARTHRITIS, UNSPECIFIED SITE: ICD-10-CM

## 2019-05-07 DIAGNOSIS — I10 ESSENTIAL HYPERTENSION: Primary | ICD-10-CM

## 2019-05-07 PROCEDURE — 99213 OFFICE O/P EST LOW 20 MIN: CPT | Performed by: FAMILY MEDICINE

## 2019-05-07 RX ORDER — MELOXICAM 15 MG/1
15 TABLET ORAL DAILY
Qty: 90 TABLET | Refills: 3 | Status: SHIPPED | OUTPATIENT
Start: 2019-05-07 | End: 2019-06-26

## 2019-05-07 RX ORDER — AMLODIPINE BESYLATE AND BENAZEPRIL HYDROCHLORIDE 5; 20 MG/1; MG/1
1 CAPSULE ORAL DAILY
Qty: 90 CAPSULE | Refills: 3 | Status: SHIPPED | OUTPATIENT
Start: 2019-05-07 | End: 2020-07-15 | Stop reason: SDUPTHER

## 2019-05-07 NOTE — PROGRESS NOTES
"Subjective   Minnie Ross is a 73 y.o. female.     History of Present Illness     Chief Complaint:   Chief Complaint   Patient presents with   • Hypertension     med refill - lab results med wellness due after 5/9/2019       Minnie Ross 73 y.o. female who presents today for Medical Management of the below listed issues and medication refills.  she has a problem list of   Patient Active Problem List   Diagnosis   • Hypertension   • Hyperlipidemia   • Eczema   • Primary osteoarthritis   .  Since the last visit, she has overall felt well.  she has been compliant with   Current Outpatient Medications:   •  amLODIPine-benazepril (LOTREL 5-20) 5-20 MG per capsule, Take 1 capsule by mouth Daily., Disp: 90 capsule, Rfl: 3  •  BIOTIN PO, Take  by mouth., Disp: , Rfl:   •  meloxicam (MOBIC) 15 MG tablet, Take 1 tablet by mouth Daily., Disp: 90 tablet, Rfl: 3  •  Multiple Vitamins-Minerals (ICAPS AREDS 2 PO), Take  by mouth., Disp: , Rfl:   •  MULTIPLE VITAMINS-MINERALS ER PO, Take  by mouth., Disp: , Rfl:   •  TURMERIC PO, Take  by mouth., Disp: , Rfl: .  she denies medication side effects.    All of the chronic condition(s) listed above are stable w/o issues.    /72   Pulse 66   Temp 98.2 °F (36.8 °C) (Oral)   Resp 14   Ht 160 cm (63\")   Wt 80.3 kg (177 lb)   LMP  (LMP Unknown)   BMI 31.35 kg/m²     Results for orders placed or performed in visit on 04/24/19   Comprehensive metabolic panel   Result Value Ref Range    Glucose 97 65 - 99 mg/dL    BUN 14 8 - 23 mg/dL    Creatinine 0.76 0.57 - 1.00 mg/dL    eGFR Non African Am 75 >60 mL/min/1.73    eGFR African Am 91 >60 mL/min/1.73    BUN/Creatinine Ratio 18.4 7.0 - 25.0    Sodium 140 136 - 145 mmol/L    Potassium 4.9 3.5 - 5.2 mmol/L    Chloride 101 98 - 107 mmol/L    Total CO2 27.2 22.0 - 29.0 mmol/L    Calcium 10.5 8.6 - 10.5 mg/dL    Total Protein 7.9 6.0 - 8.5 g/dL    Albumin 4.80 3.50 - 5.20 g/dL    Globulin 3.1 gm/dL    A/G Ratio 1.5 g/dL    Total " Bilirubin 0.5 0.2 - 1.2 mg/dL    Alkaline Phosphatase 105 39 - 117 U/L    AST (SGOT) 25 1 - 32 U/L    ALT (SGPT) 23 1 - 33 U/L   Lipid Panel With LDL/HDL Ratio   Result Value Ref Range    Total Cholesterol 201 (H) 0 - 200 mg/dL    Triglycerides 72 0 - 150 mg/dL    HDL Cholesterol 69 (H) 40 - 60 mg/dL    VLDL Cholesterol 14.4 mg/dL    LDL Cholesterol  118 (H) 0 - 100 mg/dL    LDL/HDL Ratio 1.70    TSH   Result Value Ref Range    TSH 1.960 0.270 - 4.200 mIU/mL   CBC and Differential   Result Value Ref Range    WBC 7.02 3.40 - 10.80 10*3/mm3    RBC 4.86 3.77 - 5.28 10*6/mm3    Hemoglobin 15.0 12.0 - 15.9 g/dL    Hematocrit 47.5 (H) 34.0 - 46.6 %    MCV 97.7 (H) 79.0 - 97.0 fL    MCH 30.9 26.6 - 33.0 pg    MCHC 31.6 31.5 - 35.7 g/dL    RDW 12.8 12.3 - 15.4 %    Platelets 436 140 - 450 10*3/mm3    Neutrophil Rel % 58.1 42.7 - 76.0 %    Lymphocyte Rel % 30.8 19.6 - 45.3 %    Monocyte Rel % 8.4 5.0 - 12.0 %    Eosinophil Rel % 1.9 0.3 - 6.2 %    Basophil Rel % 0.7 0.0 - 1.5 %    Neutrophils Absolute 4.08 1.70 - 7.00 10*3/mm3    Lymphocytes Absolute 2.16 0.70 - 3.10 10*3/mm3    Monocytes Absolute 0.59 0.10 - 0.90 10*3/mm3    Eosinophils Absolute 0.13 0.00 - 0.40 10*3/mm3    Basophils Absolute 0.05 0.00 - 0.20 10*3/mm3    Immature Granulocyte Rel % 0.1 0.0 - 0.5 %    Immature Grans Absolute 0.01 0.00 - 0.05 10*3/mm3    nRBC 0.0 0.0 - 0.2 /100 WBC           The following portions of the patient's history were reviewed and updated as appropriate: allergies, current medications, past family history, past medical history, past social history, past surgical history and problem list.    Review of Systems   Constitutional: Negative for activity change, chills, fatigue and fever.   Respiratory: Negative for cough and chest tightness.    Cardiovascular: Negative for chest pain and palpitations.   Gastrointestinal: Negative for abdominal pain and nausea.   Endocrine: Negative for cold intolerance.   Psychiatric/Behavioral: Negative  for behavioral problems and dysphoric mood.       Objective   Physical Exam   Constitutional: She appears well-developed and well-nourished.   Neck: Neck supple. No thyromegaly present.   Cardiovascular: Normal rate and regular rhythm.   No murmur heard.  Pulmonary/Chest: Effort normal and breath sounds normal.   Abdominal: Bowel sounds are normal. There is no tenderness.   Neurological: She is alert.   Psychiatric: She has a normal mood and affect. Her behavior is normal.   Nursing note and vitals reviewed.  Labs reviewed with pt today during visit. All questions answered.      Assessment/Plan   Minnie was seen today for hypertension.    Diagnoses and all orders for this visit:    Essential hypertension  -     amLODIPine-benazepril (LOTREL 5-20) 5-20 MG per capsule; Take 1 capsule by mouth Daily.    Primary osteoarthritis, unspecified site  -     meloxicam (MOBIC) 15 MG tablet; Take 1 tablet by mouth Daily.

## 2019-06-26 ENCOUNTER — OFFICE VISIT (OUTPATIENT)
Dept: FAMILY MEDICINE CLINIC | Facility: CLINIC | Age: 74
End: 2019-06-26

## 2019-06-26 VITALS
SYSTOLIC BLOOD PRESSURE: 124 MMHG | HEART RATE: 66 BPM | WEIGHT: 174 LBS | TEMPERATURE: 98.3 F | BODY MASS INDEX: 30.83 KG/M2 | HEIGHT: 63 IN | RESPIRATION RATE: 14 BRPM | DIASTOLIC BLOOD PRESSURE: 70 MMHG | OXYGEN SATURATION: 98 %

## 2019-06-26 DIAGNOSIS — Z00.00 MEDICARE ANNUAL WELLNESS VISIT, SUBSEQUENT: Primary | ICD-10-CM

## 2019-06-26 DIAGNOSIS — M54.5 CHRONIC BILATERAL LOW BACK PAIN, WITH SCIATICA PRESENCE UNSPECIFIED: ICD-10-CM

## 2019-06-26 DIAGNOSIS — G89.29 CHRONIC BILATERAL LOW BACK PAIN, WITH SCIATICA PRESENCE UNSPECIFIED: ICD-10-CM

## 2019-06-26 PROCEDURE — 72100 X-RAY EXAM L-S SPINE 2/3 VWS: CPT | Performed by: FAMILY MEDICINE

## 2019-06-26 PROCEDURE — 99213 OFFICE O/P EST LOW 20 MIN: CPT | Performed by: FAMILY MEDICINE

## 2019-06-26 PROCEDURE — G0439 PPPS, SUBSEQ VISIT: HCPCS | Performed by: FAMILY MEDICINE

## 2019-06-26 RX ORDER — CELECOXIB 200 MG/1
200 CAPSULE ORAL DAILY
Qty: 90 CAPSULE | Refills: 3 | Status: SHIPPED | OUTPATIENT
Start: 2019-06-26 | End: 2021-03-26 | Stop reason: SDUPTHER

## 2019-06-26 NOTE — PATIENT INSTRUCTIONS
Medicare Wellness  Personal Prevention Plan of Service     Date of Office Visit:  2019  Encounter Provider:  Andriy Nielson MD  Place of Service:  [facility]  Patient Name: Minnie Ross  :  1945    As part of the Medicare Wellness portion of your visit today, we are providing you with this personalized preventive plan of services (PPPS). This plan is based upon recommendations of the [place] Preventive Services Task Force (USPSTF) and the Advisory Committee on Immunization Practices (ACIP).    This lists the preventive care services that should be considered, and provides dates of when you are due. Items listed as completed are up-to-date and do not require any further intervention.    Health Maintenance   Topic Date Due   • PNEUMOCOCCAL VACCINES (65+ LOW/MEDIUM RISK) (2 of 2 - PPSV23) 10/29/2016   • ZOSTER VACCINE (2 of 2) 2020 (Originally 2015)   • INFLUENZA VACCINE  2019   • COLONOSCOPY  2020   • DXA SCAN  2020   • LIPID PANEL  2020   • MEDICARE ANNUAL WELLNESS  2020   • MAMMOGRAM  2020   • PAP SMEAR  01/15/2021   • TDAP/TD VACCINES (2 - Td) 2022   • HEPATITIS C SCREENING  Discontinued       No orders of the defined types were placed in this encounter.      Return if symptoms worsen or fail to improve.

## 2019-06-26 NOTE — PROGRESS NOTES
QUICK REFERENCE INFORMATION:  The ABCs of the Annual Wellness Visit    Subsequent Medicare Wellness Visit     HEALTH RISK ASSESSMENT    : 1945    Recent Hospitalizations:  No hospitalization(s) within the last year..  ccc      Current Medical Providers:  Patient Care Team:  Andriy Nielson MD as PCP - General (Family Medicine)  Minnie Colindres MD as PCP - Claims Attributed  Minnie Colindres MD as Consulting Physician (Dermatology)  Tammie Walsh APRN as Nurse Practitioner (Nurse Practitioner)  Blas Mai MD as Consulting Physician (Ophthalmology)        Smoking Status:  Social History     Tobacco Use   Smoking Status Former Smoker   Smokeless Tobacco Never Used       Alcohol Consumption:  Social History     Substance and Sexual Activity   Alcohol Use Yes    Comment: occasionally       Depression Screen:   PHQ-2/PHQ-9 Depression Screening 2019   Little interest or pleasure in doing things 0   Feeling down, depressed, or hopeless 0   Trouble falling or staying asleep, or sleeping too much -   Feeling tired or having little energy -   Poor appetite or overeating -   Feeling bad about yourself - or that you are a failure or have let yourself or your family down -   Trouble concentrating on things, such as reading the newspaper or watching television -   Moving or speaking so slowly that other people could have noticed. Or the opposite - being so fidgety or restless that you have been moving around a lot more than usual -   Thoughts that you would be better off dead, or of hurting yourself in some way -   Total Score 0       Health Habits and Functional and Cognitive Screening:  Functional & Cognitive Status 2019   Do you have difficulty preparing food and eating? No   Do you have difficulty bathing yourself, getting dressed or grooming yourself? No   Do you have difficulty using the toilet? No   Do you have difficulty moving around from place to place? No   Do you have trouble with steps or  getting out of a bed or a chair? No   In the past year have you fallen or experienced a near fall? No   Current Diet Well Balanced Diet   Dental Exam Up to date   Eye Exam Up to date   Exercise (times per week) 3 times per week   Current Exercise Activities Include Walking   Do you need help using the phone?  No   Are you deaf or do you have serious difficulty hearing?  No   Do you need help with transportation? No   Do you need help shopping? No   Do you need help preparing meals?  No   Do you need help with housework?  No   Do you need help with laundry? No   Do you need help taking your medications? No   Do you need help managing money? No   Do you ever drive or ride in a car without wearing a seat belt? No   Have you felt unusual stress, anger or loneliness in the last month? No   Who do you live with? Spouse   If you need help, do you have trouble finding someone available to you? Yes   Have you been bothered in the last four weeks by sexual problems? No   Do you have difficulty concentrating, remembering or making decisions? No           Does the patient have evidence of cognitive impairment? No    Asiprin use counseling: Does not need ASA (and currently is not on it)      Recent Lab Results:    Lab Results   Component Value Date    GLU 97 05/03/2019        Lab Results   Component Value Date    TRIG 72 05/03/2019    HDL 69 (H) 05/03/2019    VLDL 14.4 05/03/2019    LDLHDL 1.70 05/03/2019           Age-appropriate Screening Schedule:  Refer to the list below for future screening recommendations based on patient's age, sex and/or medical conditions. Orders for these recommended tests are listed in the plan section. The patient has been provided with a written plan.    Health Maintenance   Topic Date Due   • PNEUMOCOCCAL VACCINES (65+ LOW/MEDIUM RISK) (2 of 2 - PPSV23) 10/29/2016   • ZOSTER VACCINE (2 of 2) 05/14/2020 (Originally 2/26/2015)   • INFLUENZA VACCINE  08/01/2019   • COLONOSCOPY  03/02/2020   • DXA SCAN  " 03/27/2020   • LIPID PANEL  05/03/2020   • MAMMOGRAM  12/11/2020   • PAP SMEAR  01/15/2021   • TDAP/TD VACCINES (2 - Td) 05/01/2022        Subjective   History of Present Illness    Minnie Ross is a 73 y.o. female who presents for an Annual Wellness Visit.    The following portions of the patient's history were reviewed and updated as appropriate: allergies, current medications, past family history, past medical history, past social history, past surgical history and problem list.    Outpatient Medications Prior to Visit   Medication Sig Dispense Refill   • amLODIPine-benazepril (LOTREL 5-20) 5-20 MG per capsule Take 1 capsule by mouth Daily. 90 capsule 3   • BIOTIN PO Take  by mouth.     • Multiple Vitamins-Minerals (ICAPS AREDS 2 PO) Take  by mouth.     • MULTIPLE VITAMINS-MINERALS ER PO Take  by mouth.     • TURMERIC PO Take  by mouth.     • meloxicam (MOBIC) 15 MG tablet Take 1 tablet by mouth Daily. 90 tablet 3     No facility-administered medications prior to visit.        Patient Active Problem List   Diagnosis   • Hypertension   • Hyperlipidemia   • Eczema   • Primary osteoarthritis       Advance Care Planning:  Patient has an advance directive - a copy has been provided and is visible in patient header    Identification of Risk Factors:  Risk factors include: cardiovascular risk.    Review of Systems    Compared to one year ago, the patient feels her physical health is the same.  Compared to one year ago, the patient feels her mental health is the same.    Objective     Physical Exam    Vitals:    06/26/19 1502   BP: 124/70   Pulse: 66   Resp: 14   Temp: 98.3 °F (36.8 °C)   TempSrc: Oral   SpO2: 98%   Weight: 78.9 kg (174 lb)   Height: 160 cm (63\")   PainSc: 10-Worst pain ever       Patient's Body mass index is 30.82 kg/m². BMI is above normal parameters. Recommendations include: exercise counseling and nutrition counseling.      Assessment/Plan   Patient Self-Management and Personalized Health " Advice  The patient has been provided with information about: diet, exercise and weight management and preventive services including:   · Exercise counseling provided, Fall Risk assessment done, Nutrition counseling provided.    Visit Diagnoses:    ICD-10-CM ICD-9-CM   1. Medicare annual wellness visit, subsequent Z00.00 V70.0   2. Chronic bilateral low back pain, with sciatica presence unspecified M54.5 724.2    G89.29 338.29       Orders Placed This Encounter   Procedures   • XR Spine Lumbar 2 or 3 View     Order Specific Question:   Reason for Exam:     Answer:   chronic LBP       Outpatient Encounter Medications as of 6/26/2019   Medication Sig Dispense Refill   • amLODIPine-benazepril (LOTREL 5-20) 5-20 MG per capsule Take 1 capsule by mouth Daily. 90 capsule 3   • BIOTIN PO Take  by mouth.     • celecoxib (CeleBREX) 200 MG capsule Take 1 capsule by mouth Daily. 90 capsule 3   • Multiple Vitamins-Minerals (ICAPS AREDS 2 PO) Take  by mouth.     • MULTIPLE VITAMINS-MINERALS ER PO Take  by mouth.     • TURMERIC PO Take  by mouth.     • [DISCONTINUED] meloxicam (MOBIC) 15 MG tablet Take 1 tablet by mouth Daily. 90 tablet 3     No facility-administered encounter medications on file as of 6/26/2019.        Reviewed use of high risk medication in the elderly: not applicable  Reviewed for potential of harmful drug interactions in the elderly: not applicable    Follow Up:  Return if symptoms worsen or fail to improve.     An After Visit Summary and PPPS with all of these plans were given to the patient.

## 2019-06-26 NOTE — PROGRESS NOTES
"Subjective   Minnie Ross is a 73 y.o. female.     CC: Back Pain    History of Present Illness     Pt comes in today c/o LBP that radiates to the groin (at times). The leg and buttock discomfort has been hurting since January and moves. Was given Mobic with some help. Labs WNL. Movement is worse. \"Stiff and sore\" mainly, currently, in the buttock region.   Having some LBP with this. Denies injury.      The following portions of the patient's history were reviewed and updated as appropriate: allergies, current medications, past family history, past medical history, past social history, past surgical history and problem list.    Review of Systems   Constitutional: Negative for activity change, chills, fatigue and fever.   Respiratory: Negative for cough and chest tightness.    Cardiovascular: Negative for chest pain and palpitations.   Gastrointestinal: Negative for abdominal pain and nausea.   Endocrine: Negative for cold intolerance.   Musculoskeletal: Positive for back pain and myalgias.   Psychiatric/Behavioral: Negative for behavioral problems and dysphoric mood.       /70   Pulse 66   Temp 98.3 °F (36.8 °C) (Oral)   Resp 14   Ht 160 cm (63\")   Wt 78.9 kg (174 lb)   LMP  (LMP Unknown)   SpO2 98%   BMI 30.82 kg/m²     Objective   Physical Exam   Constitutional: She appears well-developed and well-nourished.   Neck: Neck supple. No thyromegaly present.   Cardiovascular: Normal rate and regular rhythm.   No murmur heard.  Pulmonary/Chest: Effort normal and breath sounds normal.   Abdominal: Bowel sounds are normal. There is no tenderness.   Musculoskeletal: She exhibits tenderness (L>R SI joint).   Neurological: She is alert.   Psychiatric: She has a normal mood and affect. Her behavior is normal.   Nursing note and vitals reviewed.  XR Lumbar Spine: ordered due to chronic pain, no comparison, read by me: scoliosis with DDD L5/S1    Assessment/Plan   Minnie was seen today for medicare wellness and " lower back pain.    Diagnoses and all orders for this visit:    Medicare annual wellness visit, subsequent    Chronic bilateral low back pain, with sciatica presence unspecified  -     XR Spine Lumbar 2 or 3 View  -     celecoxib (CeleBREX) 200 MG capsule; Take 1 capsule by mouth Daily.  -     Ambulatory Referral to Physical Therapy Evaluate and treat

## 2019-06-27 ENCOUNTER — TREATMENT (OUTPATIENT)
Dept: PHYSICAL THERAPY | Facility: CLINIC | Age: 74
End: 2019-06-27

## 2019-06-27 DIAGNOSIS — G89.29 CHRONIC MIDLINE LOW BACK PAIN WITH BILATERAL SCIATICA: Primary | ICD-10-CM

## 2019-06-27 DIAGNOSIS — M54.41 CHRONIC MIDLINE LOW BACK PAIN WITH BILATERAL SCIATICA: Primary | ICD-10-CM

## 2019-06-27 DIAGNOSIS — M54.42 CHRONIC MIDLINE LOW BACK PAIN WITH BILATERAL SCIATICA: Primary | ICD-10-CM

## 2019-06-27 DIAGNOSIS — S39.012D STRAIN OF LUMBAR REGION, SUBSEQUENT ENCOUNTER: ICD-10-CM

## 2019-06-27 PROCEDURE — 97161 PT EVAL LOW COMPLEX 20 MIN: CPT | Performed by: PHYSICAL THERAPIST

## 2019-06-27 PROCEDURE — 97110 THERAPEUTIC EXERCISES: CPT | Performed by: PHYSICAL THERAPIST

## 2019-06-27 NOTE — PROGRESS NOTES
Physical Therapy Initial Evaluation and Plan of Care    Patient: Minnie Ross   : 1945  Diagnosis/ICD-10 Code:  Chronic midline low back pain with bilateral sciatica [M54.41, M54.42, G89.29]  Referring practitioner: Andriy Nielson MD    Subjective Evaluation    History of Present Illness  Mechanism of injury: Pt present to PT with LBP with radicular symptoms into (B) LE.  The symptoms have been present since 2019 with (B) LE radiculopathy.  The LBP started in the last 2 weeks.  The pain was of insidious onset, but the pt was caring for her elderly mother at that time.      No Prior injury to the low back.          Occupation:  Retired -  [employer]  Activities:  Recumbent Bike 5x wk for 30 mins, Computer, walking, housework  PLOF: Independent  Medical Hx Reviewed.      Quality of life: excellent    Pain  Current pain ratin  At best pain ratin  At worst pain ratin  Location: Low Back (B) LE radiculopathy to knees  Quality: dull ache, radiating, tight, discomfort and burning  Relieving factors: rest (standing & Walking)  Aggravating factors: squatting (sitting >20 mins, initial WB, transfers)    Social Support  Lives in: multiple-level home  Lives with: spouse    Hand dominance: right    Diagnostic Tests  X-ray: abnormal (OA, )             Objective       Active Range of Motion     Lumbar   Flexion: 100 (%) degrees with pain  Extension: 50 (%) degrees with pain  Left lateral flexion: 100 (%) degrees   Right lateral flexion: 100 (%) degrees   Left rotation: 50 (%) degrees   Right rotation: 75 (%) degrees     Strength/Myotome Testing     Left Hip   Planes of Motion   Flexion: 4  External rotation: 4  Internal rotation: 5    Right Hip   Planes of Motion   Flexion: 4+  External rotation: 4  Internal rotation: 5    Left Knee   Flexion: 5  Extension: 5    Right Knee   Flexion: 5  Extension: 5    Left Ankle/Foot   Dorsiflexion: 5  Eversion: 4+  Great toe extension: 5    Right Ankle/Foot    Dorsiflexion: 5  Eversion: 5  Great toe extension: 5         Assessment & Plan     Assessment  Impairments: abnormal or restricted ROM, activity intolerance, impaired physical strength, lacks appropriate home exercise program and pain with function  Assessment details: Pt presents to PT with symptoms consistent with LBP with (B) LE radiculopathy.  Pt would benefit from skilled PT intervention to address the deficits noted.     Prognosis: good  Functional Limitations: carrying objects, lifting, sleeping, uncomfortable because of pain, moving in bed, sitting, stooping and unable to perform repetitive tasks  Goals  Plan Goals: SHORT TERM GOALS: Time for Goal Achievement: 4 weeks    1.  Patient to be compliant and progression of HEP                             2.  Pain level < 6/10 at worst with mentioned activities to improve function  3.  Increased thoracic, lumbar and SIJ mobility to allow for increased lumbar AROM with less pain.  4.  Increased lumbar AROM to by 25% in all planes to allow for increased ease with sit-stand transfers and functional activities    LONG TERM GOALS: Time for Goal Achievement: 2 months  1.  Pt. to score < 25 % on Back Index  2.  Pain level < 3/10 with all listed activities to return to normal.  3.  Lumbar AROM to WFL to allow for return to household & recreational activities w/o increased symptoms  4.  (B) LE and lower abdominal strength to 5/5 to allow for pushing, pulling and activities to occur without pain (driving, sitting, household requirements)        Plan  Therapy options: will be seen for skilled physical therapy services  Planned modality interventions: cryotherapy, electrical stimulation/Russian stimulation, TENS and thermotherapy (hydrocollator packs)  Other planned modality interventions: Dry Needling  Planned therapy interventions: body mechanics training, flexibility, functional ROM exercises, home exercise program, manual therapy, neuromuscular re-education, postural  training, spinal/joint mobilization, stretching, strengthening and soft tissue mobilization  Frequency: 2x week  Duration in visits: 16  Treatment plan discussed with: patient        Manual Therapy:          mins  75431;  Therapeutic Exercise:     10     mins  44791;     Neuromuscular Barbie:         mins  56751;    Therapeutic Activity:           mins  20497;     Gait Training:            mins  06450;     Ultrasound:           mins  25676;    Electrical Stimulation:          mins  90548 ( );  Dry Needling           mins self-pay  Traction           mins 02043  Canalith Repositioning         mins 77941      Timed Treatment:   10   mins   Total Treatment:     60   mins    PT SIGNATURE: Omkar Krueger PT   [facility] #[id number]    DATE TREATMENT INITIATED: 6-27-19    Medicare Initial Certification  Certification Period: 9-25-19  I certify that the therapy services are furnished while this patient is under my care.  The services outlined above are required by this patient, and will be reviewed every 90 days.     PHYSICIAN: Andriy Nielson MD      DATE:     Please sign and return via fax to [phone].. Thank you, [facility].

## 2019-07-02 ENCOUNTER — TREATMENT (OUTPATIENT)
Dept: PHYSICAL THERAPY | Facility: CLINIC | Age: 74
End: 2019-07-02

## 2019-07-02 DIAGNOSIS — M54.42 CHRONIC MIDLINE LOW BACK PAIN WITH BILATERAL SCIATICA: Primary | ICD-10-CM

## 2019-07-02 DIAGNOSIS — M54.41 CHRONIC MIDLINE LOW BACK PAIN WITH BILATERAL SCIATICA: Primary | ICD-10-CM

## 2019-07-02 DIAGNOSIS — G89.29 CHRONIC MIDLINE LOW BACK PAIN WITH BILATERAL SCIATICA: Primary | ICD-10-CM

## 2019-07-02 DIAGNOSIS — S39.012D STRAIN OF LUMBAR REGION, SUBSEQUENT ENCOUNTER: ICD-10-CM

## 2019-07-02 PROCEDURE — 97110 THERAPEUTIC EXERCISES: CPT | Performed by: PHYSICAL THERAPIST

## 2019-07-02 NOTE — PROGRESS NOTES
Physical Therapy Daily Progress Note  Visit: 2    Minnie Ross reports: I felt better after my 1st my visit for the rest of the day.      Subjective     Objective   See Exercise, Manual, and Modality Logs for complete treatment.       Assessment & Plan     Assessment  Assessment details: The pt shayy (I) w/ HEP.  Reviewed and progressed exercise today with good kiera.    Plan  Plan details: Progress ROM / strengthening / stabilization / functional activity as tolerated          Manual Therapy:          mins  18479;  Therapeutic Exercise:     33     mins  00394;     Neuromuscular Barbie:         mins  69037;    Therapeutic Activity:           mins  19385;     Gait Training:            mins  57037;     Ultrasound:           mins  70414;    Electrical Stimulation:          mins  88700 ( );  Dry Needling           mins self-pay  Traction           mins 14305  Canalith Repositioning         mins 93649      Timed Treatment:   33   mins   Total Treatment:     33   mins    Omkar Krueger PT  [place] License #: [id number]    Physical Therapist

## 2019-07-11 ENCOUNTER — TREATMENT (OUTPATIENT)
Dept: PHYSICAL THERAPY | Facility: CLINIC | Age: 74
End: 2019-07-11

## 2019-07-11 DIAGNOSIS — S39.012D STRAIN OF LUMBAR REGION, SUBSEQUENT ENCOUNTER: ICD-10-CM

## 2019-07-11 DIAGNOSIS — M54.42 CHRONIC MIDLINE LOW BACK PAIN WITH BILATERAL SCIATICA: Primary | ICD-10-CM

## 2019-07-11 DIAGNOSIS — M54.41 CHRONIC MIDLINE LOW BACK PAIN WITH BILATERAL SCIATICA: Primary | ICD-10-CM

## 2019-07-11 DIAGNOSIS — G89.29 CHRONIC MIDLINE LOW BACK PAIN WITH BILATERAL SCIATICA: Primary | ICD-10-CM

## 2019-07-11 PROCEDURE — 97110 THERAPEUTIC EXERCISES: CPT | Performed by: PHYSICAL THERAPIST

## 2019-07-11 NOTE — PROGRESS NOTES
Physical Therapy Daily Progress Note  Visit: 3    Minnie Ross reports: I am still having the pain in my back.  I saw Dr. Homero Gaston yesterday for my (B) shoulder pain.  He gave me a coritsone injection in (B) shdrs, saying that I had bursitis.      Subjective     Objective   See Exercise, Manual, and Modality Logs for complete treatment.       Assessment & Plan     Assessment  Assessment details: The pt kiera Rx well with the progression of exercises.      Plan  Plan details: Progress ROM / strengthening / stabilization / functional activity as tolerated          Manual Therapy:          mins  46226;  Therapeutic Exercise:     40     mins  18651;     Neuromuscular Barbie:         mins  97336;    Therapeutic Activity:           mins  06767;     Gait Training:            mins  94252;     Ultrasound:           mins  74958;    Electrical Stimulation:          mins  96705 ( );  Dry Needling           mins self-pay  Traction           mins 83969  Canalith Repositioning         mins 80951      Timed Treatment:   40   mins   Total Treatment:     40   mins    Omkar Krueger PT  [place] License #: [id number]    Physical Therapist

## 2019-07-15 ENCOUNTER — TREATMENT (OUTPATIENT)
Dept: PHYSICAL THERAPY | Facility: CLINIC | Age: 74
End: 2019-07-15

## 2019-07-15 DIAGNOSIS — G89.29 CHRONIC MIDLINE LOW BACK PAIN WITH BILATERAL SCIATICA: Primary | ICD-10-CM

## 2019-07-15 DIAGNOSIS — M54.41 CHRONIC MIDLINE LOW BACK PAIN WITH BILATERAL SCIATICA: Primary | ICD-10-CM

## 2019-07-15 DIAGNOSIS — S39.012D STRAIN OF LUMBAR REGION, SUBSEQUENT ENCOUNTER: ICD-10-CM

## 2019-07-15 DIAGNOSIS — M54.42 CHRONIC MIDLINE LOW BACK PAIN WITH BILATERAL SCIATICA: Primary | ICD-10-CM

## 2019-07-15 PROCEDURE — 97110 THERAPEUTIC EXERCISES: CPT | Performed by: PHYSICAL THERAPIST

## 2019-07-15 NOTE — PROGRESS NOTES
Physical Therapy Daily Progress Note  Visit: 4    Minnie Ross reports: I am feeling much better with less pain in my back.  Still have some pain in my (L) hip.      Subjective     Objective   See Exercise, Manual, and Modality Logs for complete treatment.       Assessment & Plan     Assessment  Assessment details: PT kiera All Rx better today performed in the clinic.      Plan  Plan details: Progress ROM / strengthening / stabilization / functional activity as tolerated          Manual Therapy:          mins  60167;  Therapeutic Exercise:     45     mins  50422;     Neuromuscular Barbie:         mins  54614;    Therapeutic Activity:           mins  64731;     Gait Training:            mins  00018;     Ultrasound:           mins  46866;    Electrical Stimulation:          mins  33570 ( );  Dry Needling           mins self-pay  Traction           mins 45723  Canalith Repositioning         mins 23851      Timed Treatment:   45   mins   Total Treatment:     45   mins    JOSE ANTONIO Velázquez License #: [id number]    Physical Therapist

## 2019-07-18 ENCOUNTER — TREATMENT (OUTPATIENT)
Dept: PHYSICAL THERAPY | Facility: CLINIC | Age: 74
End: 2019-07-18

## 2019-07-18 DIAGNOSIS — M54.41 CHRONIC MIDLINE LOW BACK PAIN WITH BILATERAL SCIATICA: Primary | ICD-10-CM

## 2019-07-18 DIAGNOSIS — S39.012D STRAIN OF LUMBAR REGION, SUBSEQUENT ENCOUNTER: ICD-10-CM

## 2019-07-18 DIAGNOSIS — M54.42 CHRONIC MIDLINE LOW BACK PAIN WITH BILATERAL SCIATICA: Primary | ICD-10-CM

## 2019-07-18 DIAGNOSIS — G89.29 CHRONIC MIDLINE LOW BACK PAIN WITH BILATERAL SCIATICA: Primary | ICD-10-CM

## 2019-07-18 PROCEDURE — 97110 THERAPEUTIC EXERCISES: CPT | Performed by: PHYSICAL THERAPIST

## 2019-07-18 NOTE — PROGRESS NOTES
Physical Therapy Daily Progress Note  Visit: 5    Minnie Ross reports: I am feeling better.  I can move a lot better but I can't sit on a hard chair.     Subjective     Objective   See Exercise, Manual, and Modality Logs for complete treatment.       Assessment & Plan     Assessment  Assessment details: The pt kiera the progression to the TE with resisted amb.      Plan  Plan details: Progress ROM / strengthening / stabilization / functional activity as tolerated          Manual Therapy:          mins  70833;  Therapeutic Exercise:     53     mins  06902;     Neuromuscular Barbie:         mins  02464;    Therapeutic Activity:           mins  41756;     Gait Training:            mins  30032;     Ultrasound:           mins  74941;    Electrical Stimulation:          mins  02696 ( );  Dry Needling           mins self-pay  Traction           mins 42798  Canalith Repositioning         mins 13742      Timed Treatment:   53   mins   Total Treatment:     53   mins    JOSE ANTONIO Velázquez License #: [id number]    Physical Therapist

## 2019-07-22 ENCOUNTER — TREATMENT (OUTPATIENT)
Dept: PHYSICAL THERAPY | Facility: CLINIC | Age: 74
End: 2019-07-22

## 2019-07-22 DIAGNOSIS — G89.29 CHRONIC MIDLINE LOW BACK PAIN WITH BILATERAL SCIATICA: Primary | ICD-10-CM

## 2019-07-22 DIAGNOSIS — M54.42 CHRONIC MIDLINE LOW BACK PAIN WITH BILATERAL SCIATICA: Primary | ICD-10-CM

## 2019-07-22 DIAGNOSIS — S39.012D STRAIN OF LUMBAR REGION, SUBSEQUENT ENCOUNTER: ICD-10-CM

## 2019-07-22 DIAGNOSIS — M54.41 CHRONIC MIDLINE LOW BACK PAIN WITH BILATERAL SCIATICA: Primary | ICD-10-CM

## 2019-07-22 PROCEDURE — 97110 THERAPEUTIC EXERCISES: CPT | Performed by: PHYSICAL THERAPIST

## 2019-07-22 NOTE — PROGRESS NOTES
Physical Therapy Daily Progress Note  Visit: 6    Minnie Ross reports: I am doing better with less pain but I still have pain in the back of my (L) leg.      Subjective     Objective   See Exercise, Manual, and Modality Logs for complete treatment.       Assessment & Plan     Assessment  Assessment details: The pt kiera Rx well with the progression of the resisted amb.  Continuing to work on lumbosacral strength and stability.      Plan  Plan details: Progress ROM / strengthening / stabilization / functional activity as tolerated          Manual Therapy:          mins  78152;  Therapeutic Exercise:     40/55     mins  95626;     Neuromuscular Barbie:         mins  21135;    Therapeutic Activity:           mins  25533;     Gait Training:            mins  59545;     Ultrasound:           mins  31572;    Electrical Stimulation:          mins  97207 ( );  Dry Needling           mins self-pay  Traction           mins 16141  Canalith Repositioning         mins 96490      Timed Treatment:   40   mins   Total Treatment:     55   mins    Omkar Krueger PT  [place] License #: [id number]    Physical Therapist

## 2019-07-24 ENCOUNTER — TREATMENT (OUTPATIENT)
Dept: PHYSICAL THERAPY | Facility: CLINIC | Age: 74
End: 2019-07-24

## 2019-07-24 DIAGNOSIS — G89.29 CHRONIC MIDLINE LOW BACK PAIN WITH BILATERAL SCIATICA: Primary | ICD-10-CM

## 2019-07-24 DIAGNOSIS — M54.41 CHRONIC MIDLINE LOW BACK PAIN WITH BILATERAL SCIATICA: Primary | ICD-10-CM

## 2019-07-24 DIAGNOSIS — M54.42 CHRONIC MIDLINE LOW BACK PAIN WITH BILATERAL SCIATICA: Primary | ICD-10-CM

## 2019-07-24 DIAGNOSIS — S39.012D STRAIN OF LUMBAR REGION, SUBSEQUENT ENCOUNTER: ICD-10-CM

## 2019-07-24 PROCEDURE — 97110 THERAPEUTIC EXERCISES: CPT | Performed by: PHYSICAL THERAPIST

## 2019-07-29 ENCOUNTER — TREATMENT (OUTPATIENT)
Dept: PHYSICAL THERAPY | Facility: CLINIC | Age: 74
End: 2019-07-29

## 2019-07-29 DIAGNOSIS — G89.29 CHRONIC MIDLINE LOW BACK PAIN WITH BILATERAL SCIATICA: Primary | ICD-10-CM

## 2019-07-29 DIAGNOSIS — S39.012D STRAIN OF LUMBAR REGION, SUBSEQUENT ENCOUNTER: ICD-10-CM

## 2019-07-29 DIAGNOSIS — M54.41 CHRONIC MIDLINE LOW BACK PAIN WITH BILATERAL SCIATICA: Primary | ICD-10-CM

## 2019-07-29 DIAGNOSIS — M54.42 CHRONIC MIDLINE LOW BACK PAIN WITH BILATERAL SCIATICA: Primary | ICD-10-CM

## 2019-07-29 PROCEDURE — 97110 THERAPEUTIC EXERCISES: CPT | Performed by: PHYSICAL THERAPIST

## 2019-07-29 NOTE — PROGRESS NOTES
Physical Therapy Daily Progress Note  Visit: 8    Minnie Ross reports: My back is doing better.  I can tell that I am getting stronger in my back.      Subjective     Objective   See Exercise, Manual, and Modality Logs for complete treatment.       Assessment & Plan     Assessment  Assessment details: The pt kiera Rx well with increasing endurance for the exercises performed.      Plan  Plan details: Progress ROM / strengthening / stabilization / functional activity as tolerated          Manual Therapy:          mins  22808;  Therapeutic Exercise:     55     mins  98329;     Neuromuscular Barbie:         mins  03746;    Therapeutic Activity:           mins  14383;     Gait Training:            mins  91679;     Ultrasound:           mins  74628;    Electrical Stimulation:          mins  30781 ( );  Dry Needling           mins self-pay  Traction           mins 37967  Canalith Repositioning         mins 37007      Timed Treatment:   55   mins   Total Treatment:     55   mins    Omkar Krueger PT  [place] License #: [id number]    Physical Therapist

## 2019-08-01 ENCOUNTER — TREATMENT (OUTPATIENT)
Dept: PHYSICAL THERAPY | Facility: CLINIC | Age: 74
End: 2019-08-01

## 2019-08-01 DIAGNOSIS — M54.42 CHRONIC MIDLINE LOW BACK PAIN WITH BILATERAL SCIATICA: Primary | ICD-10-CM

## 2019-08-01 DIAGNOSIS — M54.41 CHRONIC MIDLINE LOW BACK PAIN WITH BILATERAL SCIATICA: Primary | ICD-10-CM

## 2019-08-01 DIAGNOSIS — S39.012D STRAIN OF LUMBAR REGION, SUBSEQUENT ENCOUNTER: ICD-10-CM

## 2019-08-01 DIAGNOSIS — G89.29 CHRONIC MIDLINE LOW BACK PAIN WITH BILATERAL SCIATICA: Primary | ICD-10-CM

## 2019-08-01 PROCEDURE — 97110 THERAPEUTIC EXERCISES: CPT | Performed by: PHYSICAL THERAPIST

## 2019-08-01 NOTE — PROGRESS NOTES
Physical Therapy Daily Progress Note  Visit: 9    Minnie Ross reports: I am feeling better.  I have days now that I don't notice any pain in my back.  I have been trying to walk in my neighborhood on the [place] areas too.      Subjective     Objective   See Exercise, Manual, and Modality Logs for complete treatment.       Assessment & Plan     Assessment  Assessment details: The pt demos improving exercise kiera for her back with decreasing pain.  She continues to improve with her postural stability.      Plan  Plan details: Progress ROM / strengthening / stabilization / functional activity as tolerated          Manual Therapy:          mins  72785;  Therapeutic Exercise:     38/55     mins  77745;     Neuromuscular Barbie:         mins  88745;    Therapeutic Activity:           mins  54192;     Gait Training:            mins  17629;     Ultrasound:           mins  55545;    Electrical Stimulation:          mins  09929 ( );  Dry Needling           mins self-pay  Traction           mins 77975  Canalith Repositioning         mins 33046      Timed Treatment:   38   mins   Total Treatment:     55   mins    Omkar Krueger PT  [place] License #: [id number]    Physical Therapist

## 2019-08-05 ENCOUNTER — TREATMENT (OUTPATIENT)
Dept: PHYSICAL THERAPY | Facility: CLINIC | Age: 74
End: 2019-08-05

## 2019-08-05 DIAGNOSIS — G89.29 CHRONIC MIDLINE LOW BACK PAIN WITH BILATERAL SCIATICA: Primary | ICD-10-CM

## 2019-08-05 DIAGNOSIS — S39.012D STRAIN OF LUMBAR REGION, SUBSEQUENT ENCOUNTER: ICD-10-CM

## 2019-08-05 DIAGNOSIS — M54.42 CHRONIC MIDLINE LOW BACK PAIN WITH BILATERAL SCIATICA: Primary | ICD-10-CM

## 2019-08-05 DIAGNOSIS — M54.41 CHRONIC MIDLINE LOW BACK PAIN WITH BILATERAL SCIATICA: Primary | ICD-10-CM

## 2019-08-05 PROCEDURE — 97110 THERAPEUTIC EXERCISES: CPT | Performed by: PHYSICAL THERAPIST

## 2019-08-05 NOTE — PROGRESS NOTES
Physical Therapy Daily Progress Note  Visit: 10    Minnie Ross reports: she hasn't been feeling well due to a stomach bug. States her back was little more aggravated since being sick and less active but states that overall she continues to have less pain. Reports compliance with HEP.     Subjective     Objective   See Exercise, Manual, and Modality Logs for complete treatment.       Assessment/Plan: Good tolerance to there ex and demonstrates good form with there ex. / Progress ROM / strengthening / stabilization / functional activity as tolerated     Manual Therapy:          mins  30337;  Therapeutic Exercise:      53    mins  85508;     Neuromuscular Barbie:         mins  27599;    Therapeutic Activity:           mins  31410;     Gait Training:            mins  25122;     Ultrasound:           mins  97748;    Electrical Stimulation:          mins  77981 ( );  Dry Needling           mins self-pay  Traction           mins 14568  Canalith Repositioning         mins 58053      Timed Treatment:  53   mins   Total Treatment:     60   mins    Judie Gasca, PT  [place] License #: [id number]    Physical Therapist

## 2019-08-08 ENCOUNTER — TREATMENT (OUTPATIENT)
Dept: PHYSICAL THERAPY | Facility: CLINIC | Age: 74
End: 2019-08-08

## 2019-08-08 DIAGNOSIS — M54.41 CHRONIC MIDLINE LOW BACK PAIN WITH BILATERAL SCIATICA: Primary | ICD-10-CM

## 2019-08-08 DIAGNOSIS — S39.012D STRAIN OF LUMBAR REGION, SUBSEQUENT ENCOUNTER: ICD-10-CM

## 2019-08-08 DIAGNOSIS — M54.42 CHRONIC MIDLINE LOW BACK PAIN WITH BILATERAL SCIATICA: Primary | ICD-10-CM

## 2019-08-08 DIAGNOSIS — G89.29 CHRONIC MIDLINE LOW BACK PAIN WITH BILATERAL SCIATICA: Primary | ICD-10-CM

## 2019-08-08 PROCEDURE — 97110 THERAPEUTIC EXERCISES: CPT | Performed by: PHYSICAL THERAPIST

## 2019-08-08 NOTE — PROGRESS NOTES
Physical Therapy Daily Progress Note  Visit: 11    Minnie Ross reports: I feel about 90% better, but I still feel weak in my legs with steps.      Subjective     Objective   See Exercise, Manual, and Modality Logs for complete treatment.       Assessment & Plan     Assessment  Assessment details: Started some step strengthening today to help with strength and endurance on the steps.      Plan  Plan details: Progress ROM / strengthening / stabilization / functional activity as tolerated          Manual Therapy:          mins  88389;  Therapeutic Exercise:     55     mins  88641;     Neuromuscular Barbie:         mins  08356;    Therapeutic Activity:           mins  24350;     Gait Training:            mins  61229;     Ultrasound:           mins  94359;    Electrical Stimulation:          mins  99766 ( );  Dry Needling           mins self-pay  Traction           mins 30900  Canalith Repositioning         mins 92860      Timed Treatment:   55   mins   Total Treatment:     55   mins    JOSE ANTONIO Velázquez License #: [id number]    Physical Therapist

## 2019-08-12 ENCOUNTER — TREATMENT (OUTPATIENT)
Dept: PHYSICAL THERAPY | Facility: CLINIC | Age: 74
End: 2019-08-12

## 2019-08-12 DIAGNOSIS — S39.012D STRAIN OF LUMBAR REGION, SUBSEQUENT ENCOUNTER: ICD-10-CM

## 2019-08-12 DIAGNOSIS — M54.42 CHRONIC MIDLINE LOW BACK PAIN WITH BILATERAL SCIATICA: Primary | ICD-10-CM

## 2019-08-12 DIAGNOSIS — G89.29 CHRONIC MIDLINE LOW BACK PAIN WITH BILATERAL SCIATICA: Primary | ICD-10-CM

## 2019-08-12 DIAGNOSIS — M54.41 CHRONIC MIDLINE LOW BACK PAIN WITH BILATERAL SCIATICA: Primary | ICD-10-CM

## 2019-08-12 PROCEDURE — 97110 THERAPEUTIC EXERCISES: CPT | Performed by: PHYSICAL THERAPIST

## 2019-08-13 NOTE — PROGRESS NOTES
Physical Therapy Daily Progress Note  Visit: 12    Minnie Ross reports: I am feeling stronger in my legs and back.  I am not noticing the pain my back much.      Subjective     Objective   See Exercise, Manual, and Modality Logs for complete treatment.       Assessment & Plan     Assessment  Assessment details: The pt kiera Rx well with increasing lumbar strength and endurance.      Plan  Plan details: Re-assess for DC.        Manual Therapy:          mins  08217;  Therapeutic Exercise:     55     mins  04381;     Neuromuscular Barbie:         mins  61446;    Therapeutic Activity:           mins  75578;     Gait Training:            mins  16002;     Ultrasound:           mins  46862;    Electrical Stimulation:          mins  66755 ( );  Dry Needling           mins self-pay  Traction           mins 89257  Canalith Repositioning         mins 97117      Timed Treatment:   55   mins   Total Treatment:     55   mins    Omkar Krueger PT  [place] License #: [id number]    Physical Therapist

## 2019-08-15 ENCOUNTER — TREATMENT (OUTPATIENT)
Dept: PHYSICAL THERAPY | Facility: CLINIC | Age: 74
End: 2019-08-15

## 2019-08-15 DIAGNOSIS — S39.012D STRAIN OF LUMBAR REGION, SUBSEQUENT ENCOUNTER: ICD-10-CM

## 2019-08-15 DIAGNOSIS — G89.29 CHRONIC MIDLINE LOW BACK PAIN WITH BILATERAL SCIATICA: Primary | ICD-10-CM

## 2019-08-15 DIAGNOSIS — M54.42 CHRONIC MIDLINE LOW BACK PAIN WITH BILATERAL SCIATICA: Primary | ICD-10-CM

## 2019-08-15 DIAGNOSIS — M54.41 CHRONIC MIDLINE LOW BACK PAIN WITH BILATERAL SCIATICA: Primary | ICD-10-CM

## 2019-08-15 PROCEDURE — 97110 THERAPEUTIC EXERCISES: CPT | Performed by: PHYSICAL THERAPIST

## 2019-08-15 NOTE — PROGRESS NOTES
Physical Therapy Daily Progress Note / Discharge Note  Initial Visit: 19   Total Visits: 13    Minnie Ross reports: I am feeling much better just a occasional soreness in my (R) hip, but otherwise no pain.      Subjective Evaluation    Pain  At worst pain ratin           Objective       Active Range of Motion     Lumbar   Flexion: 100 degrees   Extension: 100 degrees   Left lateral flexion: 100 degrees   Right lateral flexion: 100 degrees   Left rotation: 100 degrees   Right rotation: 50 degrees     Strength/Myotome Testing     Left Hip   Planes of Motion   Flexion: 5  External rotation: 5  Internal rotation: 5    Right Hip   Planes of Motion   Flexion: 5  External rotation: 5  Internal rotation: 5    Left Knee   Flexion: 5  Extension: 5    Right Knee   Flexion: 5  Extension: 5    Left Ankle/Foot   Dorsiflexion: 5  Eversion: 5  Great toe extension: 5    Right Ankle/Foot   Dorsiflexion: 5  Eversion: 5  Great toe extension: 5     See Exercise, Manual, and Modality Logs for complete treatment.     Goals: ALL GOALS MET  SHORT TERM GOALS: Time for Goal Achievement: 4 weeks    1.  Patient to be compliant and progression of HEP                             2.  Pain level < 6/10 at worst with mentioned activities to improve function  3.  Increased thoracic, lumbar and SIJ mobility to allow for increased lumbar AROM with less pain.  4.  Increased lumbar AROM to by 25% in all planes to allow for increased ease with sit-stand transfers and functional activities    LONG TERM GOALS: Time for Goal Achievement: 2 months  1.  Pt. to score < 25 % on Back Index  2.  Pain level < 3/10 with all listed activities to return to normal.  3.  Lumbar AROM to WFL to allow for return to household & recreational activities w/o increased symptoms  4.  (B) LE and lower abdominal strength to 5/5 to allow for pushing, pulling and activities to occur without pain (driving, sitting, household requirements)    Assessment & Plan      Assessment  Assessment details: The pt has met all set goals at this time.  Reporting that she is doing her exercises and activities without issue.  Skilled care is no longer warranted at this time.      Plan  Plan details: DC to HEP.  Pt instructed to call with questions or issues related to their injury.              Manual Therapy:          mins  20536;  Therapeutic Exercise:     55     mins  06175;     Neuromuscular Barbie:         mins  36290;    Therapeutic Activity:           mins  10230;     Gait Training:            mins  51082;     Ultrasound:           mins  29774;    Electrical Stimulation:          mins  07631 ( );  Dry Needling           mins self-pay  Traction           mins 01341  Canalith Repositioning         mins 49842      Timed Treatment:   55   mins   Total Treatment:     55   mins    Omkar Krueger PT  [place] License #: [id number]    Physical Therapist

## 2019-09-01 ENCOUNTER — APPOINTMENT (OUTPATIENT)
Dept: GENERAL RADIOLOGY | Facility: HOSPITAL | Age: 74
End: 2019-09-01

## 2019-09-01 ENCOUNTER — HOSPITAL ENCOUNTER (OUTPATIENT)
Facility: HOSPITAL | Age: 74
Setting detail: OBSERVATION
Discharge: HOME | End: 2019-09-02
Attending: EMERGENCY MEDICINE | Admitting: INTERNAL MEDICINE

## 2019-09-01 ENCOUNTER — APPOINTMENT (OUTPATIENT)
Dept: CT IMAGING | Facility: HOSPITAL | Age: 74
End: 2019-09-01

## 2019-09-01 DIAGNOSIS — R07.9 CHEST PAIN, UNSPECIFIED TYPE: Primary | ICD-10-CM

## 2019-09-01 PROBLEM — R06.09 DYSPNEA ON EXERTION: Status: ACTIVE | Noted: 2019-09-01

## 2019-09-01 PROBLEM — R61 DIAPHORESIS: Status: ACTIVE | Noted: 2019-09-01

## 2019-09-01 LAB
ALBUMIN SERPL-MCNC: 4.6 G/DL (ref 3.5–5.2)
ALBUMIN/GLOB SERPL: 1.4 G/DL
ALP SERPL-CCNC: 129 U/L (ref 39–117)
ALT SERPL W P-5'-P-CCNC: 35 U/L (ref 1–33)
ANION GAP SERPL CALCULATED.3IONS-SCNC: 9.9 MMOL/L (ref 5–15)
AST SERPL-CCNC: 25 U/L (ref 1–32)
BASOPHILS # BLD AUTO: 0.03 10*3/MM3 (ref 0–0.2)
BASOPHILS NFR BLD AUTO: 0.3 % (ref 0–1.5)
BILIRUB SERPL-MCNC: 0.4 MG/DL (ref 0.2–1.2)
BUN BLD-MCNC: 16 MG/DL (ref 8–23)
BUN/CREAT SERPL: 23.5 (ref 7–25)
CALCIUM SPEC-SCNC: 10.1 MG/DL (ref 8.6–10.5)
CHLORIDE SERPL-SCNC: 100 MMOL/L (ref 98–107)
CO2 SERPL-SCNC: 24.1 MMOL/L (ref 22–29)
CREAT BLD-MCNC: 0.68 MG/DL (ref 0.57–1)
D DIMER PPP FEU-MCNC: 0.85 MCGFEU/ML (ref 0–0.49)
DEPRECATED RDW RBC AUTO: 47.1 FL (ref 37–54)
EOSINOPHIL # BLD AUTO: 0.09 10*3/MM3 (ref 0–0.4)
EOSINOPHIL NFR BLD AUTO: 0.9 % (ref 0.3–6.2)
ERYTHROCYTE [DISTWIDTH] IN BLOOD BY AUTOMATED COUNT: 13.2 % (ref 12.3–15.4)
GFR SERPL CREATININE-BSD FRML MDRD: 85 ML/MIN/1.73
GLOBULIN UR ELPH-MCNC: 3.3 GM/DL
GLUCOSE BLD-MCNC: 96 MG/DL (ref 65–99)
HCT VFR BLD AUTO: 43.3 % (ref 34–46.6)
HGB BLD-MCNC: 14.1 G/DL (ref 12–15.9)
HOLD SPECIMEN: NORMAL
HOLD SPECIMEN: NORMAL
IMM GRANULOCYTES # BLD AUTO: 0.15 10*3/MM3 (ref 0–0.05)
IMM GRANULOCYTES NFR BLD AUTO: 1.5 % (ref 0–0.5)
INR PPP: 1.01 (ref 0.9–1.1)
LYMPHOCYTES # BLD AUTO: 2.08 10*3/MM3 (ref 0.7–3.1)
LYMPHOCYTES NFR BLD AUTO: 20.5 % (ref 19.6–45.3)
MCH RBC QN AUTO: 31.7 PG (ref 26.6–33)
MCHC RBC AUTO-ENTMCNC: 32.6 G/DL (ref 31.5–35.7)
MCV RBC AUTO: 97.3 FL (ref 79–97)
MONOCYTES # BLD AUTO: 0.8 10*3/MM3 (ref 0.1–0.9)
MONOCYTES NFR BLD AUTO: 7.9 % (ref 5–12)
NEUTROPHILS # BLD AUTO: 6.98 10*3/MM3 (ref 1.7–7)
NEUTROPHILS NFR BLD AUTO: 68.9 % (ref 42.7–76)
NRBC BLD AUTO-RTO: 0 /100 WBC (ref 0–0.2)
NT-PROBNP SERPL-MCNC: 49 PG/ML (ref 5–900)
PLATELET # BLD AUTO: 488 10*3/MM3 (ref 140–450)
PMV BLD AUTO: 8.5 FL (ref 6–12)
POTASSIUM BLD-SCNC: 4 MMOL/L (ref 3.5–5.2)
PROT SERPL-MCNC: 7.9 G/DL (ref 6–8.5)
PROTHROMBIN TIME: 13 SECONDS (ref 11.7–14.2)
RBC # BLD AUTO: 4.45 10*6/MM3 (ref 3.77–5.28)
SODIUM BLD-SCNC: 134 MMOL/L (ref 136–145)
TROPONIN T SERPL-MCNC: <0.01 NG/ML (ref 0–0.03)
TROPONIN T SERPL-MCNC: <0.01 NG/ML (ref 0–0.03)
WBC NRBC COR # BLD: 10.13 10*3/MM3 (ref 3.4–10.8)
WHOLE BLOOD HOLD SPECIMEN: NORMAL
WHOLE BLOOD HOLD SPECIMEN: NORMAL

## 2019-09-01 PROCEDURE — 93005 ELECTROCARDIOGRAM TRACING: CPT | Performed by: EMERGENCY MEDICINE

## 2019-09-01 PROCEDURE — 85610 PROTHROMBIN TIME: CPT | Performed by: EMERGENCY MEDICINE

## 2019-09-01 PROCEDURE — G0378 HOSPITAL OBSERVATION PER HR: HCPCS

## 2019-09-01 PROCEDURE — 85025 COMPLETE CBC W/AUTO DIFF WBC: CPT | Performed by: EMERGENCY MEDICINE

## 2019-09-01 PROCEDURE — 83880 ASSAY OF NATRIURETIC PEPTIDE: CPT | Performed by: EMERGENCY MEDICINE

## 2019-09-01 PROCEDURE — 0 IOPAMIDOL PER 1 ML: Performed by: EMERGENCY MEDICINE

## 2019-09-01 PROCEDURE — 93005 ELECTROCARDIOGRAM TRACING: CPT | Performed by: INTERNAL MEDICINE

## 2019-09-01 PROCEDURE — 71046 X-RAY EXAM CHEST 2 VIEWS: CPT

## 2019-09-01 PROCEDURE — 99220 PR INITIAL OBSERVATION CARE/DAY 70 MINUTES: CPT | Performed by: INTERNAL MEDICINE

## 2019-09-01 PROCEDURE — 99284 EMERGENCY DEPT VISIT MOD MDM: CPT

## 2019-09-01 PROCEDURE — 93005 ELECTROCARDIOGRAM TRACING: CPT

## 2019-09-01 PROCEDURE — 84484 ASSAY OF TROPONIN QUANT: CPT | Performed by: INTERNAL MEDICINE

## 2019-09-01 PROCEDURE — 84484 ASSAY OF TROPONIN QUANT: CPT | Performed by: EMERGENCY MEDICINE

## 2019-09-01 PROCEDURE — 85379 FIBRIN DEGRADATION QUANT: CPT | Performed by: EMERGENCY MEDICINE

## 2019-09-01 PROCEDURE — 80053 COMPREHEN METABOLIC PANEL: CPT | Performed by: EMERGENCY MEDICINE

## 2019-09-01 PROCEDURE — 93010 ELECTROCARDIOGRAM REPORT: CPT | Performed by: INTERNAL MEDICINE

## 2019-09-01 PROCEDURE — 71275 CT ANGIOGRAPHY CHEST: CPT

## 2019-09-01 RX ORDER — ASPIRIN 325 MG
325 TABLET ORAL ONCE
Status: COMPLETED | OUTPATIENT
Start: 2019-09-01 | End: 2019-09-01

## 2019-09-01 RX ORDER — ACETAMINOPHEN 325 MG/1
650 TABLET ORAL EVERY 6 HOURS PRN
Status: DISCONTINUED | OUTPATIENT
Start: 2019-09-01 | End: 2019-09-02 | Stop reason: HOSPADM

## 2019-09-01 RX ORDER — SODIUM CHLORIDE 0.9 % (FLUSH) 0.9 %
10 SYRINGE (ML) INJECTION AS NEEDED
Status: DISCONTINUED | OUTPATIENT
Start: 2019-09-01 | End: 2019-09-02 | Stop reason: HOSPADM

## 2019-09-01 RX ORDER — SODIUM CHLORIDE 0.9 % (FLUSH) 0.9 %
10 SYRINGE (ML) INJECTION EVERY 12 HOURS SCHEDULED
Status: DISCONTINUED | OUTPATIENT
Start: 2019-09-01 | End: 2019-09-02 | Stop reason: HOSPADM

## 2019-09-01 RX ORDER — ONDANSETRON 2 MG/ML
4 INJECTION INTRAMUSCULAR; INTRAVENOUS EVERY 6 HOURS PRN
Status: DISCONTINUED | OUTPATIENT
Start: 2019-09-01 | End: 2019-09-02 | Stop reason: HOSPADM

## 2019-09-01 RX ADMIN — ASPIRIN 325 MG: 325 TABLET ORAL at 09:20

## 2019-09-01 RX ADMIN — SODIUM CHLORIDE, PRESERVATIVE FREE 10 ML: 5 INJECTION INTRAVENOUS at 19:47

## 2019-09-01 RX ADMIN — IOPAMIDOL 95 ML: 755 INJECTION, SOLUTION INTRAVENOUS at 14:27

## 2019-09-01 NOTE — PLAN OF CARE
Problem: Cardiac: ACS (Acute Coronary Syndrome) (Adult)  Goal: Signs and Symptoms of Listed Potential Problems Will be Absent, Minimized or Managed (Cardiac: ACS)  Outcome: Ongoing (interventions implemented as appropriate)   09/01/19 9880   Goal/Outcome Evaluation   Problems Assessed (Acute Coronary Syndrome) chest pain (angina);dysrhythmia/arrhythmia   Problems Present (Acute Coronary Syn) chest pain (angina)

## 2019-09-01 NOTE — H&P
Date of Hospital Visit: 19  Encounter Provider: Ashwin Greer MD  Place of Service: [facility] CARDIOLOGY  Patient Name: Minnie Ross  :1945  Referral Provider: Dr Banegas    Chief complaint: chest tightness    History of Present Illness    Mrs Ross is a pleasant 75yo woman who denies any previous cardiac history.  Her Epic chart states she has hyperlipidemia but her last lipid panel was within goal (on no medications).  She has treated hypertension.  She quit smoking 12 years ago.  Her mother had CAD in her 90s.     She has had two weeks of left sided chest tightness that is relatively constant at rest.  She does not note it with exertion.  It is non-pleuritic and non-radiating. She has no nausea, diaphoresis, or dyspnea at rest.    For the last two days she has noted exertional dyspnea and diaphoresis that resolves when she rests.     Past Medical History:   Diagnosis Date   • Abnormal Pap smear of cervix    • Arthritis    • H/O complete eye exam 2018   • History of blood transfusion    • History of bone density study 2018   • Hypertension    • IBS (irritable bowel syndrome)        Past Surgical History:   Procedure Laterality Date   • APPENDECTOMY     • CHOLECYSTECTOMY     • KNEE SURGERY Right     Homero Gaston MD   • MAMMO BILATERAL     • PAP SMEAR  2016       Prior to Admission medications    Medication Sig Start Date End Date Taking? Authorizing Provider   amLODIPine-benazepril (LOTREL 5-20) 5-20 MG per capsule Take 1 capsule by mouth Daily. 19  Yes Andriy Nielson MD   BIOTIN PO Take  by mouth.   Yes ProviderSkyler MD   celecoxib (CeleBREX) 200 MG capsule Take 1 capsule by mouth Daily. 19  Yes Andriy Nielson MD   Multiple Vitamins-Minerals (ICAPS AREDS 2 PO) Take  by mouth.   Yes Skyler Fitzgerald MD   TURMERIC PO Take  by mouth.   Yes Skyler Fitzgerald MD   MULTIPLE VITAMINS-MINERALS ER PO Take  by mouth.    Provider  "MD Skyler       Social History     Socioeconomic History   • Marital status:      Spouse name: Not on file   • Number of children: Not on file   • Years of education: Not on file   • Highest education level: Not on file   Tobacco Use   • Smoking status: Former Smoker     Last attempt to quit:      Years since quittin.6   • Smokeless tobacco: Never Used   Substance and Sexual Activity   • Alcohol use: Yes     Alcohol/week: 8.4 oz     Types: 14 Glasses of wine per week     Comment: occasionally   • Drug use: No   • Sexual activity: No     Partners: Male     Birth control/protection: Post-menopausal   Social History Narrative    New GYN pt        Family History   Problem Relation Age of Onset   • Hypertension Mother    • Heart disease Mother    • Deep vein thrombosis Brother        Review of Systems   Constitutional: Positive for diaphoresis and fatigue.   Respiratory: Positive for chest tightness and shortness of breath.    All other systems reviewed and are negative.       Objective:     Vitals:    19 1321 19 1347 19 1349 19 1452   BP:   127/72 138/93   BP Location:    Left arm   Patient Position:    Sitting   Pulse: 81 93  102   Resp:    16   Temp:    97.8 °F (36.6 °C)   TempSrc:    Oral   SpO2: 94% 95% 97% 94%   Weight:    75.4 kg (166 lb 4.8 oz)   Height:    160 cm (63\")     Body mass index is 29.46 kg/m².  Flowsheet Rows      First Filed Value   Admission Height  160 cm (63\") Documented at 2019 1452   Admission Weight  75.4 kg (166 lb 4.8 oz) Documented at 2019 1452          Physical Exam   Constitutional: She is oriented to person, place, and time. She appears well-developed and well-nourished.   HENT:   Head: Normocephalic.   Nose: Nose normal.   Mouth/Throat: Oropharynx is clear and moist.   Eyes: Conjunctivae and EOM are normal. Pupils are equal, round, and reactive to light.   Neck: Normal range of motion. No JVD present.   Cardiovascular: Normal " rate, regular rhythm, normal heart sounds and intact distal pulses.   Pulmonary/Chest: Effort normal and breath sounds normal.   Abdominal: Soft. She exhibits no mass. There is no tenderness.   Musculoskeletal: Normal range of motion. She exhibits no edema.   Neurological: She is alert and oriented to person, place, and time. No cranial nerve deficit.   Skin: Skin is warm and dry. No erythema.   Psychiatric: She has a normal mood and affect. Her behavior is normal. Judgment and thought content normal.   Vitals reviewed.              Lab Review:                Results from last 7 days   Lab Units 09/01/19  0912   SODIUM mmol/L 134*   POTASSIUM mmol/L 4.0   CHLORIDE mmol/L 100   CO2 mmol/L 24.1   BUN mg/dL 16   CREATININE mg/dL 0.68   GLUCOSE mg/dL 96   CALCIUM mg/dL 10.1     Results from last 7 days   Lab Units 09/01/19  0912   TROPONIN T ng/mL <0.010     Results from last 7 days   Lab Units 09/01/19  0912   WBC 10*3/mm3 10.13   HEMOGLOBIN g/dL 14.1   HEMATOCRIT % 43.3   PLATELETS 10*3/mm3 488*     Results from last 7 days   Lab Units 09/01/19  0912   INR  1.01                     I personally viewed and interpreted the patient's EKG/Telemetry data -- NSR, NSST abnormality, no prior for comparison        Assessment/Plan:     1.  Chest pain  2.  LÓPEZ  3.  Exertional diaphoresis  4.  HTN     Her chest pain is profoundly atypical.  Her first Tn is normal; her EKG shows NSST abnormalities.  I really don't the pain is anginal, as it's constant and only present at rest.    Her exertional dyspnea and diaphoresis are the more worrisome symptoms.  I will repeat a Tn and EKG tonight.  If they're stable, we'll get a perfusion stress tomorrow.  If I see any changes, then she would need a cath.    We will continue her home medications and add aspirin. Hold NSAIDS.

## 2019-09-01 NOTE — PLAN OF CARE
Problem: Patient Care Overview  Goal: Plan of Care Review  Outcome: Ongoing (interventions implemented as appropriate)   09/01/19 5491   Coping/Psychosocial   Plan of Care Reviewed With patient   Plan of Care Review   Progress no change   OTHER   Outcome Summary Pt amditted today with chest pain. Currently has no chest pain and hasn't since arrived to the unit. Awaiting results and will continue to monitor.

## 2019-09-01 NOTE — PLAN OF CARE
Problem: Fall Risk (Adult)  Goal: Identify Related Risk Factors and Signs and Symptoms  Outcome: Outcome(s) achieved Date Met: 09/01/19 09/01/19 4840   Fall Risk (Adult)   Related Risk Factors (Fall Risk) age-related changes;environment unfamiliar;homeostatic imbalance

## 2019-09-01 NOTE — ED PROVIDER NOTES
EMERGENCY DEPARTMENT ENCOUNTER    CHIEF COMPLAINT  Chief Complaint: constant, left-sided anterior chest tightness  History given by: patient  History limited by: none  Room Number: 09/09  PMD: Andriy Nielson MD      HPI:  Pt is a 74 y.o. female who presents complaining of constant, L-sided anterior chest tightness that started two weeks ago. Pt became concerned yesterday when she developed cold sweats, so she decided to come to the ER for further evaluation. Pt also c/o increased fatigue and SOA with minimal exertion for the last month. Pt has received steroid injections in her bilateral shoulders for shoulder pain that developed within the last two months. Pt reports drinking two glasses of wine multiple times a week. Pt denies hx of recent smoking and DM. Pt reports that her mother began to have cardiac issues at age 90.     Duration: two weeks  Onset: gradual  Timing: constant  Location: L-sided anterior chest  Radiation: none  Quality: constant, L-sided anterior chest tightness  Intensity/Severity: mild to moderate  Progression: unchanged  Associated Symptoms: cold sweats, increased fatigue and SOA with minimal exertion  Aggravating Factors: exertion  Alleviating Factors: none  Previous Episodes: none  Treatment before arrival: none    PAST MEDICAL HISTORY  Active Ambulatory Problems     Diagnosis Date Noted   • Hypertension 09/21/2016   • Hyperlipidemia 09/21/2016   • Eczema 09/21/2016   • Primary osteoarthritis 05/07/2019     Resolved Ambulatory Problems     Diagnosis Date Noted   • No Resolved Ambulatory Problems     Past Medical History:   Diagnosis Date   • Abnormal Pap smear of cervix    • Arthritis    • H/O complete eye exam 01/2018   • History of blood transfusion    • History of bone density study 04/2018   • Hypertension    • IBS (irritable bowel syndrome)        PAST SURGICAL HISTORY  Past Surgical History:   Procedure Laterality Date   • APPENDECTOMY     • CHOLECYSTECTOMY     • KNEE SURGERY Right      Homero Gaston MD   • MAMMO BILATERAL  2018   • PAP SMEAR  2016       FAMILY HISTORY  Family History   Problem Relation Age of Onset   • Hypertension Mother    • Heart disease Mother    • Deep vein thrombosis Brother        SOCIAL HISTORY  Social History     Socioeconomic History   • Marital status:      Spouse name: Not on file   • Number of children: Not on file   • Years of education: Not on file   • Highest education level: Not on file   Tobacco Use   • Smoking status: Former Smoker     Last attempt to quit:      Years since quittin.6   • Smokeless tobacco: Never Used   Substance and Sexual Activity   • Alcohol use: Yes     Alcohol/week: 8.4 oz     Types: 14 Glasses of wine per week     Comment: occasionally   • Drug use: No   • Sexual activity: No     Partners: Male     Birth control/protection: Post-menopausal   Social History Narrative    New GYN pt        ALLERGIES  Patient has no known allergies.    REVIEW OF SYSTEMS  Review of Systems   Constitutional: Positive for chills, diaphoresis ( cold sweats) and fatigue. Negative for fever.   HENT: Negative for rhinorrhea and sore throat.    Eyes: Negative for visual disturbance.   Respiratory: Positive for chest tightness ( constant, left-sided anterior chest tightness) and shortness of breath ( worse with exertion ). Negative for cough.    Cardiovascular: Negative for chest pain, palpitations and leg swelling.   Gastrointestinal: Negative for abdominal pain, diarrhea and vomiting.   Endocrine: Negative.    Genitourinary: Negative for decreased urine volume, dysuria and frequency.   Musculoskeletal: Negative for neck pain.   Skin: Negative for rash.   Neurological: Negative for syncope and headaches.   Psychiatric/Behavioral: Negative.    All other systems reviewed and are negative.      PHYSICAL EXAM  ED Triage Vitals [19 0819]   Temp Heart Rate Resp BP SpO2   98.7 °F (37.1 °C) 117 16 -- 97 %      Temp src Heart Rate Source  Patient Position BP Location FiO2 (%)   Tympanic Monitor -- -- --       Physical Exam   Constitutional: She is oriented to person, place, and time.  Non-toxic appearance. She appears distressed (mild).   HENT:   Head: Normocephalic and atraumatic.   Eyes: EOM are normal.   Neck: Normal range of motion. Neck supple.   Cardiovascular: Regular rhythm, normal heart sounds and intact distal pulses. Tachycardia present.   No murmur heard.  Pulses:       Posterior tibial pulses are 2+ on the right side, and 2+ on the left side.   Pulmonary/Chest: Effort normal and breath sounds normal. No respiratory distress.   Abdominal: Soft. Bowel sounds are normal. There is no tenderness. There is no rebound and no guarding.   Musculoskeletal: Normal range of motion. She exhibits no edema.   Neurological: She is alert and oriented to person, place, and time.   Skin: Skin is warm and dry.   Psychiatric: Affect normal.   Nursing note and vitals reviewed.      LAB RESULTS  Lab Results (last 24 hours)     Procedure Component Value Units Date/Time    CBC & Differential [[id number]] Collected:  09/01/19 0912    Specimen:  Blood Updated:  09/01/19 0931    Narrative:       The following orders were created for panel order CBC & Differential.  Procedure                               Abnormality         Status                     ---------                               -----------         ------                     CBC Auto Differential[[id number]]        Abnormal            Final result                 Please view results for these tests on the individual orders.    Comprehensive Metabolic Panel [[id number]]  (Abnormal) Collected:  09/01/19 0912    Specimen:  Blood Updated:  09/01/19 0952     Glucose 96 mg/dL      BUN 16 mg/dL      Creatinine 0.68 mg/dL      Sodium 134 mmol/L      Potassium 4.0 mmol/L      Chloride 100 mmol/L      CO2 24.1 mmol/L      Calcium 10.1 mg/dL      Total Protein 7.9 g/dL      Albumin 4.60 g/dL      ALT (SGPT) 35 U/L       AST (SGOT) 25 U/L      Alkaline Phosphatase 129 U/L      Total Bilirubin 0.4 mg/dL      eGFR Non African Amer 85 mL/min/1.73      Globulin 3.3 gm/dL      A/G Ratio 1.4 g/dL      BUN/Creatinine Ratio 23.5     Anion Gap 9.9 mmol/L     Narrative:       GFR Normal >60  Chronic Kidney Disease <60  Kidney Failure <15    BNP [[id number]]  (Normal) Collected:  09/01/19 0912    Specimen:  Blood Updated:  09/01/19 0951     proBNP 49.0 pg/mL     Narrative:       Among patients with dyspnea, NT-proBNP is highly sensitive for the detection of acute congestive heart failure. In addition NT-proBNP of <300 pg/ml effectively rules out acute congestive heart failure with 99% negative predictive value.    Troponin [[id number]]  (Normal) Collected:  09/01/19 0912    Specimen:  Blood Updated:  09/01/19 0952     Troponin T <0.010 ng/mL     Narrative:       Troponin T Reference Range:  <= 0.03 ng/mL-   Negative for AMI  >0.03 ng/mL-     Abnormal for myocardial necrosis.  Clinicians would have to utilize clinical acumen, EKG, Troponin and serial changes to determine if it is an Acute Myocardial Infarction or myocardial injury due to an underlying chronic condition.     Protime-INR [[id number]]  (Normal) Collected:  09/01/19 0912    Specimen:  Blood Updated:  09/01/19 0939     Protime 13.0 Seconds      INR 1.01    CBC Auto Differential [[id number]]  (Abnormal) Collected:  09/01/19 0912    Specimen:  Blood Updated:  09/01/19 0931     WBC 10.13 10*3/mm3      RBC 4.45 10*6/mm3      Hemoglobin 14.1 g/dL      Hematocrit 43.3 %      MCV 97.3 fL      MCH 31.7 pg      MCHC 32.6 g/dL      RDW 13.2 %      RDW-SD 47.1 fl      MPV 8.5 fL      Platelets 488 10*3/mm3      Neutrophil % 68.9 %      Lymphocyte % 20.5 %      Monocyte % 7.9 %      Eosinophil % 0.9 %      Basophil % 0.3 %      Immature Grans % 1.5 %      Neutrophils, Absolute 6.98 10*3/mm3      Lymphocytes, Absolute 2.08 10*3/mm3      Monocytes, Absolute 0.80 10*3/mm3      Eosinophils, Absolute  0.09 10*3/mm3      Basophils, Absolute 0.03 10*3/mm3      Immature Grans, Absolute 0.15 10*3/mm3      nRBC 0.0 /100 WBC     D-dimer, Quantitative [[id number]] Collected:  09/01/19 0912    Specimen:  Blood Updated:  09/01/19 1302          I ordered the above labs and reviewed the results    RADIOLOGY  XR Chest 2 View   Final Result   No evidence for acute pulmonary process. Tortuous aorta.   Follow-up as clinically indicated.       This report was finalized on 9/1/2019 9:06 AM by Dr. Ibrahima Estrella M.D.               I ordered the above noted radiological studies. Interpreted by radiologist.  Reviewed by me in PACS.       PROCEDURES  Procedures  EKG          EKG time: 0824  Rhythm/Rate: nsr, rate in the 80s  P waves and WV: LAE, nl AMARA  QRS, axis: unremarkable QRS   ST and T waves: non-specific ST and T wave findings    Interpreted Contemporaneously by me, independently viewed. No prior for comparison.     HEART SCORE    History Slightly or non-suspicious (0)  ECG Nonspecific repol disturbance (1)  Age > or = 65 (2)  Risk factors 1 or 2 (1)  Troponin < or = Normal limit (0)    This patient's HEART score is 4.     HEART Score Key:  Scores 0-3: 0.9-1.7% risk of adverse cardiac event. In the HEART Score study, these patients were discharged (0.99% in the retrospective study, 1.7% in the prospective study)  Scores 4-6: 12-16.6% risk of adverse cardiac event. In the HEART Score study, these patients were admitted to the hospital. (11.6% retrospective, 16.6% prospective)  Scores ?7: 50-65% risk of adverse cardiac event. In the HEART Score study, these patients were candidates for early invasive measures. (65.2% retrospective, 50.1% prospective)        PROGRESS AND CONSULTS     1240 Received a call from Dr. Greer (Cardiologist) and discussed patient's case. Dr. Greer will admit the pt.     1309 Rechecked pt. Pt is resting comfortably. Notified pt of negative Troponin and CXR. I told the pt that due to her EKG, age, and  risk factors, she will need to be admitted for further evaluation and workup. Discussed the plan to admit the pt for her CP and further cardiac workup. Pt and family agree with the plan and all questions were addressed.      MEDICAL DECISION MAKING  Results were reviewed/discussed with the patient and they were also made aware of online access. Pt also made aware that some labs, such as cultures, will not be resulted during ER visit and follow up with PMD is necessary.     MDM  Number of Diagnoses or Management Options  Chest pain, unspecified type:      Amount and/or Complexity of Data Reviewed  Clinical lab tests: reviewed and ordered (Troponin - <0.010)  Tests in the radiology section of CPT®: ordered and reviewed (CXR - negative acute)  Tests in the medicine section of CPT®: reviewed and ordered (EKG - see procedure note)  Discuss the patient with other providers: yes (Dr. Greer -- Cardiologist)  Independent visualization of images, tracings, or specimens: yes           DIAGNOSIS  Final diagnoses:   Chest pain, unspecified type       DISPOSITION  ADMISSION    Discussed treatment plan and reason for admission with pt/family and admitting physician.  Pt/family voiced understanding of the plan for admission for further testing/treatment as needed.       Latest Documented Vital Signs:  As of 1:33 PM  BP- 137/77 HR- 81 Temp- 98.7 °F (37.1 °C) (Tympanic) O2 sat- 94%    --  Documentation assistance provided by jefferson Patel for Dr. Banegas.  Information recorded by the scribe was done at my direction and has been verified and validated by me.              Junaid Patel  09/01/19 4154       Carey Banegas MD  09/02/19 4326

## 2019-09-01 NOTE — ED NOTES
Nursing report ED to floor  Minnie Ross  74 y.o.  female    HPI (triage note):   Chief Complaint   Patient presents with   • Chest Pain   • Shortness of Breath       Admitting doctor:   Ashwin Greer MD    Admitting diagnosis:   The encounter diagnosis was Chest pain, unspecified type.    Code status:   Current Code Status     This patient does not have a recorded code status. Please follow your organizational policy for patients in this situation.          Allergies:   Patient has no known allergies.    Weight:   There were no vitals filed for this visit.    Most recent vitals:   Vitals:    09/01/19 1318 09/01/19 1321 09/01/19 1347 09/01/19 1349   BP: 137/77   127/72   Pulse: 87 81 93    Resp:       Temp:       TempSrc:       SpO2: 91% 94% 95% 97%       Active LDAs/IV Access:   Lines, Drains & Airways    Active LDAs     Name:   Placement date:   Placement time:   Site:   Days:    Peripheral IV 09/01/19 0830 Right Antecubital   09/01/19 0830    Antecubital   less than 1                Labs (abnormal labs have a star):   Labs Reviewed   COMPREHENSIVE METABOLIC PANEL - Abnormal; Notable for the following components:       Result Value    Sodium 134 (*)     ALT (SGPT) 35 (*)     Alkaline Phosphatase 129 (*)     All other components within normal limits    Narrative:     GFR Normal >60  Chronic Kidney Disease <60  Kidney Failure <15   CBC WITH AUTO DIFFERENTIAL - Abnormal; Notable for the following components:    MCV 97.3 (*)     Platelets 488 (*)     Immature Grans % 1.5 (*)     Immature Grans, Absolute 0.15 (*)     All other components within normal limits   D-DIMER, QUANTITATIVE - Abnormal; Notable for the following components:    D-Dimer, Quantitative 0.85 (*)     All other components within normal limits    Narrative:     The [employer]ago D-Dimer test used in conjunction with a clinical pretest probability (PTP) assessment model, has been approved by the FDA to rule out the presence of venous thromboembolism (VTE)  in outpatients suspected of deep venous thrombosis (DVT) or pulmonary embolism (PE). The cut-off for negative predictive value is <0.50 MCGFEU/mL.   BNP (IN-HOUSE) - Normal    Narrative:     Among patients with dyspnea, NT-proBNP is highly sensitive for the detection of acute congestive heart failure. In addition NT-proBNP of <300 pg/ml effectively rules out acute congestive heart failure with 99% negative predictive value.   TROPONIN (IN-HOUSE) - Normal    Narrative:     Troponin T Reference Range:  <= 0.03 ng/mL-   Negative for AMI  >0.03 ng/mL-     Abnormal for myocardial necrosis.  Clinicians would have to utilize clinical acumen, EKG, Troponin and serial changes to determine if it is an Acute Myocardial Infarction or myocardial injury due to an underlying chronic condition.    PROTIME-INR - Normal   RAINBOW DRAW    Narrative:     The following orders were created for panel order [place] Draw.  Procedure                               Abnormality         Status                     ---------                               -----------         ------                     Light Blue Top[[id number]]                                   Final result               Green Top (Gel)[[id number]]                                  Final result               Lavender Top[[id number]]                                     Final result               Gold Top - SST[[id number]]                                   Final result                 Please view results for these tests on the individual orders.   CBC AND DIFFERENTIAL    Narrative:     The following orders were created for panel order CBC & Differential.  Procedure                               Abnormality         Status                     ---------                               -----------         ------                     CBC Auto Differential[[id number]]        Abnormal            Final result                 Please view results for these tests on the individual orders.   LIGHT BLUE  TOP   GREEN TOP   LAVENDER TOP   GOLD TOP - [facility]       EKG:   ECG 12 Lead   Preliminary Result   HEART RATE= 86  bpm   RR Interval= 700  ms   PA Interval= 138  ms   P Horizontal Axis= -7  deg   P Front Axis= 47  deg   QRSD Interval= 65  ms   QT Interval= 341  ms   QRS Axis= 36  deg   T Wave Axis= 58  deg   - BORDERLINE ECG -   Sinus rhythm   Probable left atrial enlargement   Minimal ST depression, anterolateral leads   Electronically Signed By:    Date and Time of Study: 2019 08:24:54          Meds given in ED:   Medications   sodium chloride 0.9 % flush 10 mL (not administered)   aspirin tablet 325 mg (325 mg Oral Given 19 09)       Imaging results:  Xr Chest 2 View    Result Date: 2019  No evidence for acute pulmonary process. Tortuous aorta. Follow-up as clinically indicated.  This report was finalized on 2019 9:06 AM by Dr. Ibrahima Estrella M.D.        Ambulatory status:   - with assist    Social issues:   Social History     Socioeconomic History   • Marital status:      Spouse name: Not on file   • Number of children: Not on file   • Years of education: Not on file   • Highest education level: Not on file   Tobacco Use   • Smoking status: Former Smoker     Last attempt to quit: 2010     Years since quittin.6   • Smokeless tobacco: Never Used   Substance and Sexual Activity   • Alcohol use: Yes     Alcohol/week: 8.4 oz     Types: 14 Glasses of wine per week     Comment: occasionally   • Drug use: No   • Sexual activity: No     Partners: Male     Birth control/protection: Post-menopausal   Social History Narrative    New GYN pt         Ashwin Dailey RN  19 [phone]

## 2019-09-01 NOTE — PLAN OF CARE
Problem: Fall Risk (Adult)  Goal: Absence of Fall  Outcome: Outcome(s) achieved Date Met: 09/01/19 09/01/19 1831   Fall Risk (Adult)   Absence of Fall achieves outcome

## 2019-09-02 ENCOUNTER — APPOINTMENT (OUTPATIENT)
Dept: NUCLEAR MEDICINE | Facility: HOSPITAL | Age: 74
End: 2019-09-02

## 2019-09-02 VITALS
TEMPERATURE: 98.1 F | BODY MASS INDEX: 29.46 KG/M2 | DIASTOLIC BLOOD PRESSURE: 78 MMHG | RESPIRATION RATE: 20 BRPM | HEIGHT: 63 IN | HEART RATE: 85 BPM | SYSTOLIC BLOOD PRESSURE: 133 MMHG | WEIGHT: 166.3 LBS | OXYGEN SATURATION: 98 %

## 2019-09-02 LAB
BH CV STRESS BP STAGE 1: NORMAL
BH CV STRESS DURATION MIN STAGE 1: 3
BH CV STRESS DURATION SEC STAGE 1: 0
BH CV STRESS GRADE STAGE 1: 10
BH CV STRESS HR STAGE 1: 135
BH CV STRESS METS STAGE 1: 5
BH CV STRESS PROTOCOL 1: NORMAL
BH CV STRESS RECOVERY BP: NORMAL MMHG
BH CV STRESS RECOVERY HR: 91 BPM
BH CV STRESS SPEED STAGE 1: 1.7
BH CV STRESS STAGE 1: 1
MAXIMAL PREDICTED HEART RATE: 148 BPM
PERCENT MAX PREDICTED HR: 91.89 %
STRESS BASELINE BP: NORMAL MMHG
STRESS BASELINE HR: 89 BPM
STRESS PERCENT HR: 108 %
STRESS POST ESTIMATED WORKLOAD: 4.7 METS
STRESS POST EXERCISE DUR MIN: 3 MIN
STRESS POST EXERCISE DUR SEC: 0 SEC
STRESS POST PEAK BP: NORMAL MMHG
STRESS POST PEAK HR: 136 BPM
STRESS TARGET HR: 126 BPM

## 2019-09-02 PROCEDURE — 99217 PR OBSERVATION CARE DISCHARGE MANAGEMENT: CPT | Performed by: INTERNAL MEDICINE

## 2019-09-02 PROCEDURE — A9500 TC99M SESTAMIBI: HCPCS | Performed by: INTERNAL MEDICINE

## 2019-09-02 PROCEDURE — 93018 CV STRESS TEST I&R ONLY: CPT | Performed by: INTERNAL MEDICINE

## 2019-09-02 PROCEDURE — G0378 HOSPITAL OBSERVATION PER HR: HCPCS

## 2019-09-02 PROCEDURE — 78452 HT MUSCLE IMAGE SPECT MULT: CPT | Performed by: INTERNAL MEDICINE

## 2019-09-02 PROCEDURE — 93017 CV STRESS TEST TRACING ONLY: CPT

## 2019-09-02 PROCEDURE — 78452 HT MUSCLE IMAGE SPECT MULT: CPT

## 2019-09-02 PROCEDURE — 93016 CV STRESS TEST SUPVJ ONLY: CPT | Performed by: INTERNAL MEDICINE

## 2019-09-02 PROCEDURE — 0 TECHNETIUM SESTAMIBI: Performed by: INTERNAL MEDICINE

## 2019-09-02 RX ADMIN — TECHNETIUM TC 99M SESTAMIBI 1 DOSE: 1 INJECTION INTRAVENOUS at 11:06

## 2019-09-02 RX ADMIN — TECHNETIUM TC 99M SESTAMIBI 1 DOSE: 1 INJECTION INTRAVENOUS at 09:09

## 2019-09-02 RX ADMIN — SODIUM CHLORIDE, PRESERVATIVE FREE 10 ML: 5 INJECTION INTRAVENOUS at 08:52

## 2019-09-02 RX ADMIN — LISINOPRIL: 20 TABLET ORAL at 08:52

## 2019-09-02 NOTE — PLAN OF CARE
Problem: Cardiac: ACS (Acute Coronary Syndrome) (Adult)  Goal: Signs and Symptoms of Listed Potential Problems Will be Absent, Minimized or Managed (Cardiac: ACS)  Outcome: Ongoing (interventions implemented as appropriate)   09/02/19 0504   Goal/Outcome Evaluation   Problems Assessed (Acute Coronary Syndrome) all   Problems Present (Acute Coronary Syn) chest pain (angina)       Problem: Patient Care Overview  Goal: Plan of Care Review  Outcome: Ongoing (interventions implemented as appropriate)   09/02/19 0504   Coping/Psychosocial   Plan of Care Reviewed With patient   Plan of Care Review   Progress no change   OTHER   Outcome Summary continue to monitor vs, labs, chest pain, safety maintained, npo after midnight     Goal: Individualization and Mutuality  Outcome: Ongoing (interventions implemented as appropriate)   09/02/19 0504   Individualization   Patient Specific Interventions monitor for chest pain       Problem: Fall Risk (Adult)  Goal: Absence of Fall  Outcome: Ongoing (interventions implemented as appropriate)   09/02/19 0504   Fall Risk (Adult)   Absence of Fall making progress toward outcome

## 2019-09-02 NOTE — DISCHARGE SUMMARY
Date of Discharge:  9/2/2019  Date of Admit: 9/1/2019    Discharge Diagnosis:        Chest pain    Dyspnea on exertion    Diaphoresis    Hypertension      Hospital Course:     Mrs Ross was admitted with very atypical chest pain, as well as new onset exertional dyspnea/diaphoresis.  Her EKG shows NSST abnormalities which were stable.  She ruled out for ACS.  She completed a treadmill Cardiolite. There were no EKG changes with exercise and her images were normal.  We had her walk around the nursing station briskly and up two flights of stairs and she had mild dyspnea and normal oxygen saturations.    She will follow up with her PCP for further evaluation.      Physical Exam   Constitutional: She is oriented to person, place, and time. She appears well-developed and well-nourished.   HENT:   Head: Normocephalic.   Nose: Nose normal.   Mouth/Throat: Oropharynx is clear and moist.   Eyes: Conjunctivae and EOM are normal. Pupils are equal, round, and reactive to light.   Neck: Normal range of motion. No JVD present.   Cardiovascular: Normal rate, regular rhythm, normal heart sounds and intact distal pulses.   Pulmonary/Chest: Effort normal and breath sounds normal.   Abdominal: Soft. She exhibits no mass. There is no tenderness.   Musculoskeletal: Normal range of motion. She exhibits no edema.   Neurological: She is alert and oriented to person, place, and time. No cranial nerve deficit.   Skin: Skin is warm and dry. No erythema.   Psychiatric: She has a normal mood and affect. Her behavior is normal. Judgment and thought content normal.   Vitals reviewed.        Condition on Discharge: stable    Discharge Medications     Discharge Medications      Continue These Medications      Instructions Start Date   amLODIPine-benazepril 5-20 MG per capsule  Commonly known as:  LOTREL 5-20   1 capsule, Oral, Daily      BIOTIN PO   Oral      celecoxib 200 MG capsule  Commonly known as:  CeleBREX   200 mg, Oral, Daily      ICAPS  AREDS 2 PO   Oral      MULTIPLE VITAMINS-MINERALS ER PO   Oral      TURMERIC PO   Oral             Discharge Diet: as tolerated    Activity at Discharge: as tolerated    Discharge disposition: home    Follow-up Appointments  No future appointments.      Ashwin Greer MD  09/02/19  3:15 PM    Time: Discharge 35 min

## 2019-09-03 ENCOUNTER — TRANSITIONAL CARE MANAGEMENT TELEPHONE ENCOUNTER (OUTPATIENT)
Dept: FAMILY MEDICINE CLINIC | Facility: CLINIC | Age: 74
End: 2019-09-03

## 2019-09-03 ENCOUNTER — TELEPHONE (OUTPATIENT)
Dept: FAMILY MEDICINE CLINIC | Facility: CLINIC | Age: 74
End: 2019-09-03

## 2019-09-03 DIAGNOSIS — R91.1 PULMONARY NODULE: Primary | ICD-10-CM

## 2019-09-03 NOTE — TELEPHONE ENCOUNTER
Dr Greer contacted me about this incidentally found pulmonary nodule. I ordered a pulmonary consult. Please let pt know.

## 2019-09-03 NOTE — OUTREACH NOTE
"Pt states she is the same, tightness in chest, SOB especially with exertion or climbing stairs, pt also states she is experiencing occasions of \"cold sweats\" with exertion as well. She is however, relieved to know there are no heart issues. She was sched to be out of town this week and next, trip now to begin this Thurs or Friday.   "

## 2019-09-03 NOTE — PROGRESS NOTES
Case Management Discharge Note    Final Note: Patient was DC'd home 9/2         Transportation Services  Private: Car    Final Discharge Disposition Code: 01 - home or self-care

## 2019-09-05 ENCOUNTER — OFFICE VISIT (OUTPATIENT)
Dept: FAMILY MEDICINE CLINIC | Facility: CLINIC | Age: 74
End: 2019-09-05

## 2019-09-05 VITALS
DIASTOLIC BLOOD PRESSURE: 78 MMHG | HEIGHT: 63 IN | TEMPERATURE: 98.8 F | BODY MASS INDEX: 29.59 KG/M2 | SYSTOLIC BLOOD PRESSURE: 142 MMHG | WEIGHT: 167 LBS | RESPIRATION RATE: 16 BRPM | HEART RATE: 78 BPM | OXYGEN SATURATION: 98 %

## 2019-09-05 DIAGNOSIS — Z09 HOSPITAL DISCHARGE FOLLOW-UP: ICD-10-CM

## 2019-09-05 DIAGNOSIS — R91.1 PULMONARY NODULE: ICD-10-CM

## 2019-09-05 DIAGNOSIS — R07.9 CHEST PAIN, UNSPECIFIED TYPE: Primary | ICD-10-CM

## 2019-09-05 PROCEDURE — 99214 OFFICE O/P EST MOD 30 MIN: CPT | Performed by: FAMILY MEDICINE

## 2019-09-05 NOTE — PROGRESS NOTES
Transitional Care Follow Up Visit  Subjective     Minnie Ross is a 74 y.o. female who presents for a transitional care management visit.    Within 48 business hours after discharge our office contacted her via telephone to coordinate her care and needs.      I reviewed and discussed the details of that call along with the discharge summary, hospital problems, inpatient lab results, inpatient diagnostic studies, and consultation reports with Minnie.     Current outpatient and discharge medications have been reconciled for the patient.  Reviewed by: Andriy Nielson MD      Date of TCM Phone Call 9/3/2019   [facility]   Date of Admission 9/1/2019   Date of Discharge 9/2/2019   Discharge Disposition Home or Self Care     Risk for Readmission (LACE) Score: 1 (9/2/2019  6:00 AM)      History of Present Illness   Course During Hospital Stay:      Date of Discharge:  9/2/2019  Date of Admit: 9/1/2019     Discharge Diagnosis:         Chest pain    Dyspnea on exertion    Diaphoresis    Hypertension      Hospital Course:      Mrs Ross was admitted with very atypical chest pain, as well as new onset exertional dyspnea/diaphoresis.  Her EKG shows NSST abnormalities which were stable.  She ruled out for ACS.  She completed a treadmill Cardiolite. There were no EKG changes with exercise and her images were normal.  We had her walk around the nursing station briskly and up two flights of stairs and she had mild dyspnea and normal oxygen saturations.     She will follow up with her PCP for further evaluation.    Current outpatient and discharge medications have been reconciled for the patient.  Reviewed by: Andriy Nielson MD       The following portions of the patient's history were reviewed and updated as appropriate: allergies, current medications, past family history, past medical history, past social history, past surgical history and problem list.    Review of Systems   Constitutional: Negative for activity  "change, chills, fatigue and fever.   Respiratory: Negative for cough and chest tightness.    Cardiovascular: Negative for chest pain and palpitations.   Gastrointestinal: Negative for abdominal pain and nausea.   Endocrine: Negative for cold intolerance.   Psychiatric/Behavioral: Negative for behavioral problems and dysphoric mood.       /78   Pulse 78   Temp 98.8 °F (37.1 °C) (Oral)   Resp 16   Ht 160 cm (63\")   Wt 75.8 kg (167 lb)   LMP  (LMP Unknown)   SpO2 98%   BMI 29.58 kg/m²     Objective   Physical Exam   Constitutional: She appears well-developed and well-nourished.   Neck: Neck supple. No thyromegaly present.   Cardiovascular: Normal rate and regular rhythm.   No murmur heard.  Pulmonary/Chest: Effort normal and breath sounds normal.   Abdominal: Bowel sounds are normal. There is no tenderness.   Neurological: She is alert.   Psychiatric: She has a normal mood and affect. Her behavior is normal.   Nursing note and vitals reviewed.  Hospital records reviewed with pt confirming HPI.      Assessment/Plan   Minnie was seen today for transitional care management, chest pain and shortness of breath.    Diagnoses and all orders for this visit:    Chest pain, unspecified type    Hospital discharge follow-up    Pulmonary nodule    Dr Greer contacted me earlier in the week about finding the pulmonary nodule and I submitted a Pulmonary Consult that the pt is encouraged to attend.               "

## 2019-11-20 ENCOUNTER — TRANSCRIBE ORDERS (OUTPATIENT)
Dept: ADMINISTRATIVE | Facility: HOSPITAL | Age: 74
End: 2019-11-20

## 2019-11-20 DIAGNOSIS — R91.1 LUNG NODULE: Primary | ICD-10-CM

## 2019-12-04 ENCOUNTER — OFFICE VISIT (OUTPATIENT)
Dept: FAMILY MEDICINE CLINIC | Facility: CLINIC | Age: 74
End: 2019-12-04

## 2019-12-04 VITALS
TEMPERATURE: 97.9 F | RESPIRATION RATE: 16 BRPM | SYSTOLIC BLOOD PRESSURE: 139 MMHG | DIASTOLIC BLOOD PRESSURE: 78 MMHG | BODY MASS INDEX: 29.95 KG/M2 | HEART RATE: 74 BPM | HEIGHT: 63 IN | WEIGHT: 169 LBS | OXYGEN SATURATION: 98 %

## 2019-12-04 DIAGNOSIS — J40 BRONCHITIS: Primary | ICD-10-CM

## 2019-12-04 DIAGNOSIS — L71.0 PERIORAL DERMATITIS: ICD-10-CM

## 2019-12-04 PROCEDURE — 99214 OFFICE O/P EST MOD 30 MIN: CPT | Performed by: FAMILY MEDICINE

## 2019-12-04 RX ORDER — TRIAMCINOLONE ACETONIDE 1 MG/G
CREAM TOPICAL 2 TIMES DAILY
Qty: 15 G | Refills: 1 | Status: SHIPPED | OUTPATIENT
Start: 2019-12-04 | End: 2020-08-20

## 2019-12-04 RX ORDER — AZITHROMYCIN 250 MG/1
TABLET, FILM COATED ORAL
Qty: 6 TABLET | Refills: 0 | Status: SHIPPED | OUTPATIENT
Start: 2019-12-04 | End: 2019-12-19

## 2019-12-04 NOTE — PROGRESS NOTES
"Subjective   Minnie Ross is a 74 y.o. female.     CC: URI    History of Present Illness     Minnie Ross 74 y.o. female who presents for evaluation of cough. Symptoms include congestion and dry cough.  Onset of symptoms was 2 weeks ago, unchanged since that time. Patient denies wheezing, hemoptysis.   Evaluation to date: none Treatment to date:  none.     Pt also reports a 10 day h/o a burning rash around the mouth. No change in toothpaste or foods. Uses Aquaphor w/o help.     The following portions of the patient's history were reviewed and updated as appropriate: allergies, current medications, past family history, past medical history, past social history, past surgical history and problem list.    Review of Systems   Constitutional: Negative for chills and fatigue.   HENT: Positive for congestion.    Respiratory: Positive for cough. Negative for shortness of breath and wheezing.    Skin: Positive for rash.       /78   Pulse 74   Temp 97.9 °F (36.6 °C) (Oral)   Resp 16   Ht 160 cm (63\")   Wt 76.7 kg (169 lb)   LMP  (LMP Unknown)   SpO2 98%   BMI 29.94 kg/m²     Objective   Physical Exam   Constitutional: She appears well-developed and well-nourished.   HENT:   Right Ear: Tympanic membrane normal.   Left Ear: Tympanic membrane normal.   Nose: Right sinus exhibits no maxillary sinus tenderness and no frontal sinus tenderness. Left sinus exhibits no maxillary sinus tenderness and no frontal sinus tenderness.   Mouth/Throat: No tonsillar exudate.   Skin: Rash (perioral rash) noted.       Assessment/Plan   Minnie was seen today for cough and rash.    Diagnoses and all orders for this visit:    Bronchitis  -     azithromycin (ZITHROMAX Z-ALFONZO) 250 MG tablet; Take 2 tablets the first day, then 1 tablet daily for 4 days.    Perioral dermatitis  -     triamcinolone (KENALOG) 0.1 % cream; Apply  topically to the appropriate area as directed 2 (Two) Times a Day. Up to 14 days max    Pt to use " Sensodyne.

## 2019-12-19 ENCOUNTER — OFFICE VISIT (OUTPATIENT)
Dept: FAMILY MEDICINE CLINIC | Facility: CLINIC | Age: 74
End: 2019-12-19

## 2019-12-19 VITALS
SYSTOLIC BLOOD PRESSURE: 130 MMHG | DIASTOLIC BLOOD PRESSURE: 80 MMHG | HEIGHT: 63 IN | HEART RATE: 88 BPM | OXYGEN SATURATION: 99 % | RESPIRATION RATE: 16 BRPM | BODY MASS INDEX: 29.95 KG/M2 | WEIGHT: 169 LBS | TEMPERATURE: 98.1 F

## 2019-12-19 DIAGNOSIS — J06.9 ACUTE URI: Primary | ICD-10-CM

## 2019-12-19 PROCEDURE — 99213 OFFICE O/P EST LOW 20 MIN: CPT | Performed by: NURSE PRACTITIONER

## 2019-12-19 RX ORDER — BENZONATATE 100 MG/1
200 CAPSULE ORAL 3 TIMES DAILY PRN
Qty: 30 CAPSULE | Refills: 0 | OUTPATIENT
Start: 2019-12-19 | End: 2020-06-03

## 2019-12-19 RX ORDER — PREDNISONE 20 MG/1
20 TABLET ORAL DAILY
Qty: 5 TABLET | Refills: 0 | OUTPATIENT
Start: 2019-12-19 | End: 2020-06-03

## 2019-12-19 RX ORDER — AZITHROMYCIN 250 MG/1
TABLET, FILM COATED ORAL
Qty: 6 TABLET | Refills: 0 | OUTPATIENT
Start: 2019-12-19 | End: 2020-06-03

## 2019-12-19 NOTE — PROGRESS NOTES
Subjective   Minnie Ross is a 74 y.o. female.     History of Present Illness   Minnie Ross 74 y.o. female who presents for evaluation of upper respiratory congestion, cough. Symptoms include congestion, post nasal drip and dry cough.  Onset of symptoms was a few days ago, gradually worsening since that time. Patient denies shortness of breath, wheezing.   Was seen in this office a couple of weeks ago and states she feels symptoms resolved but returned a few days ago.       The following portions of the patient's history were reviewed and updated as appropriate: allergies, current medications, past family history, past medical history, past social history, past surgical history and problem list.    Review of Systems   Constitutional: Positive for fatigue. Negative for chills, fever and unexpected weight change.   HENT: Positive for congestion, postnasal drip and rhinorrhea. Negative for sinus pressure, sinus pain, sore throat and trouble swallowing.    Respiratory: Positive for cough. Negative for shortness of breath and wheezing.    Cardiovascular: Negative for chest pain and palpitations.   Psychiatric/Behavioral: Negative for behavioral problems.       Objective   Physical Exam   Constitutional: She is oriented to person, place, and time. She appears well-developed and well-nourished.   HENT:   Right Ear: Tympanic membrane, external ear and ear canal normal.   Left Ear: Tympanic membrane, external ear and ear canal normal.   Nose: Mucosal edema and rhinorrhea present. Right sinus exhibits no maxillary sinus tenderness and no frontal sinus tenderness. Left sinus exhibits no maxillary sinus tenderness and no frontal sinus tenderness.   Mouth/Throat: Uvula is midline. Posterior oropharyngeal erythema present.   Cardiovascular: Normal rate and regular rhythm.   Pulmonary/Chest: Effort normal and breath sounds normal.   Neurological: She is alert and oriented to person, place, and time.   Skin: Skin is warm.    Psychiatric: She has a normal mood and affect. Judgment normal.   Nursing note and vitals reviewed.      Assessment/Plan   Minnie was seen today for cough.    Diagnoses and all orders for this visit:    Bronchitis  -     azithromycin (ZITHROMAX Z-ALFONZO) 250 MG tablet; Take 2 tablets the first day, then 1 tablet daily for 4 days.    Other orders  -     predniSONE (DELTASONE) 20 MG tablet; Take 1 tablet by mouth Daily.  -     benzonatate (TESSALON) 100 MG capsule; Take 2 capsules by mouth 3 (Three) Times a Day As Needed for Cough.

## 2019-12-31 ENCOUNTER — APPOINTMENT (OUTPATIENT)
Dept: WOMENS IMAGING | Facility: HOSPITAL | Age: 74
End: 2019-12-31

## 2019-12-31 PROCEDURE — 77067 SCR MAMMO BI INCL CAD: CPT | Performed by: RADIOLOGY

## 2019-12-31 PROCEDURE — 77063 BREAST TOMOSYNTHESIS BI: CPT | Performed by: RADIOLOGY

## 2020-06-03 ENCOUNTER — APPOINTMENT (OUTPATIENT)
Dept: GENERAL RADIOLOGY | Facility: HOSPITAL | Age: 75
End: 2020-06-03

## 2020-06-03 PROCEDURE — 73610 X-RAY EXAM OF ANKLE: CPT | Performed by: NURSE PRACTITIONER

## 2020-07-02 ENCOUNTER — TELEPHONE (OUTPATIENT)
Dept: FAMILY MEDICINE CLINIC | Facility: CLINIC | Age: 75
End: 2020-07-02

## 2020-07-02 DIAGNOSIS — I10 ESSENTIAL HYPERTENSION: Primary | ICD-10-CM

## 2020-07-09 ENCOUNTER — LAB (OUTPATIENT)
Dept: LAB | Facility: HOSPITAL | Age: 75
End: 2020-07-09

## 2020-07-09 LAB
ALBUMIN SERPL-MCNC: 4.4 G/DL (ref 3.5–5.2)
ALBUMIN/GLOB SERPL: 1.5 G/DL
ALP SERPL-CCNC: 78 U/L (ref 39–117)
ALT SERPL W P-5'-P-CCNC: 41 U/L (ref 1–33)
ANION GAP SERPL CALCULATED.3IONS-SCNC: 11 MMOL/L (ref 5–15)
AST SERPL-CCNC: 32 U/L (ref 1–32)
BASOPHILS # BLD AUTO: 0.04 10*3/MM3 (ref 0–0.2)
BASOPHILS NFR BLD AUTO: 0.6 % (ref 0–1.5)
BILIRUB SERPL-MCNC: 0.4 MG/DL (ref 0–1.2)
BUN SERPL-MCNC: 14 MG/DL (ref 8–23)
BUN/CREAT SERPL: 17.1 (ref 7–25)
CALCIUM SPEC-SCNC: 9.9 MG/DL (ref 8.6–10.5)
CHLORIDE SERPL-SCNC: 104 MMOL/L (ref 98–107)
CHOLEST SERPL-MCNC: 192 MG/DL (ref 0–200)
CO2 SERPL-SCNC: 27 MMOL/L (ref 22–29)
CREAT SERPL-MCNC: 0.82 MG/DL (ref 0.57–1)
DEPRECATED RDW RBC AUTO: 42.9 FL (ref 37–54)
EOSINOPHIL # BLD AUTO: 0.05 10*3/MM3 (ref 0–0.4)
EOSINOPHIL NFR BLD AUTO: 0.7 % (ref 0.3–6.2)
ERYTHROCYTE [DISTWIDTH] IN BLOOD BY AUTOMATED COUNT: 12.3 % (ref 12.3–15.4)
GFR SERPL CREATININE-BSD FRML MDRD: 69 ML/MIN/1.73
GLOBULIN UR ELPH-MCNC: 3 GM/DL
GLUCOSE SERPL-MCNC: 91 MG/DL (ref 65–99)
HCT VFR BLD AUTO: 42.5 % (ref 34–46.6)
HDLC SERPL-MCNC: 74 MG/DL (ref 40–60)
HGB BLD-MCNC: 14.3 G/DL (ref 12–15.9)
IMM GRANULOCYTES # BLD AUTO: 0.05 10*3/MM3 (ref 0–0.05)
IMM GRANULOCYTES NFR BLD AUTO: 0.7 % (ref 0–0.5)
LDLC SERPL CALC-MCNC: 107 MG/DL (ref 0–100)
LDLC/HDLC SERPL: 1.45 {RATIO}
LYMPHOCYTES # BLD AUTO: 2.47 10*3/MM3 (ref 0.7–3.1)
LYMPHOCYTES NFR BLD AUTO: 35.3 % (ref 19.6–45.3)
MCH RBC QN AUTO: 31.8 PG (ref 26.6–33)
MCHC RBC AUTO-ENTMCNC: 33.6 G/DL (ref 31.5–35.7)
MCV RBC AUTO: 94.7 FL (ref 79–97)
MONOCYTES # BLD AUTO: 0.57 10*3/MM3 (ref 0.1–0.9)
MONOCYTES NFR BLD AUTO: 8.2 % (ref 5–12)
NEUTROPHILS NFR BLD AUTO: 3.81 10*3/MM3 (ref 1.7–7)
NEUTROPHILS NFR BLD AUTO: 54.5 % (ref 42.7–76)
NRBC BLD AUTO-RTO: 0 /100 WBC (ref 0–0.2)
PLATELET # BLD AUTO: 397 10*3/MM3 (ref 140–450)
PMV BLD AUTO: 9 FL (ref 6–12)
POTASSIUM SERPL-SCNC: 4.3 MMOL/L (ref 3.5–5.2)
PROT SERPL-MCNC: 7.4 G/DL (ref 6–8.5)
RBC # BLD AUTO: 4.49 10*6/MM3 (ref 3.77–5.28)
SODIUM SERPL-SCNC: 142 MMOL/L (ref 136–145)
TRIGL SERPL-MCNC: 54 MG/DL (ref 0–150)
TSH SERPL DL<=0.05 MIU/L-ACNC: 1.17 UIU/ML (ref 0.27–4.2)
VLDLC SERPL-MCNC: 10.8 MG/DL (ref 5–40)
WBC # BLD AUTO: 6.99 10*3/MM3 (ref 3.4–10.8)

## 2020-07-09 PROCEDURE — 80061 LIPID PANEL: CPT | Performed by: FAMILY MEDICINE

## 2020-07-09 PROCEDURE — 80053 COMPREHEN METABOLIC PANEL: CPT | Performed by: FAMILY MEDICINE

## 2020-07-09 PROCEDURE — 84443 ASSAY THYROID STIM HORMONE: CPT | Performed by: FAMILY MEDICINE

## 2020-07-09 PROCEDURE — 85025 COMPLETE CBC W/AUTO DIFF WBC: CPT | Performed by: FAMILY MEDICINE

## 2020-07-09 PROCEDURE — 36415 COLL VENOUS BLD VENIPUNCTURE: CPT | Performed by: FAMILY MEDICINE

## 2020-07-15 ENCOUNTER — OFFICE VISIT (OUTPATIENT)
Dept: FAMILY MEDICINE CLINIC | Facility: CLINIC | Age: 75
End: 2020-07-15

## 2020-07-15 VITALS
HEIGHT: 63 IN | SYSTOLIC BLOOD PRESSURE: 126 MMHG | DIASTOLIC BLOOD PRESSURE: 78 MMHG | WEIGHT: 172.4 LBS | BODY MASS INDEX: 30.55 KG/M2 | TEMPERATURE: 97.6 F | OXYGEN SATURATION: 97 % | HEART RATE: 74 BPM | RESPIRATION RATE: 16 BRPM

## 2020-07-15 DIAGNOSIS — T14.8XXA BRUISING: ICD-10-CM

## 2020-07-15 DIAGNOSIS — Z12.11 SCREENING FOR COLON CANCER: ICD-10-CM

## 2020-07-15 DIAGNOSIS — Z00.00 MEDICARE ANNUAL WELLNESS VISIT, SUBSEQUENT: Primary | ICD-10-CM

## 2020-07-15 DIAGNOSIS — I10 ESSENTIAL HYPERTENSION: ICD-10-CM

## 2020-07-15 PROCEDURE — G0439 PPPS, SUBSEQ VISIT: HCPCS | Performed by: FAMILY MEDICINE

## 2020-07-15 PROCEDURE — 99213 OFFICE O/P EST LOW 20 MIN: CPT | Performed by: FAMILY MEDICINE

## 2020-07-15 RX ORDER — CELECOXIB 200 MG/1
200 CAPSULE ORAL DAILY
Qty: 90 CAPSULE | Refills: 3 | Status: CANCELLED | OUTPATIENT
Start: 2020-07-15

## 2020-07-15 RX ORDER — AMLODIPINE BESYLATE AND BENAZEPRIL HYDROCHLORIDE 5; 20 MG/1; MG/1
1 CAPSULE ORAL DAILY
Qty: 90 CAPSULE | Refills: 3 | Status: SHIPPED | OUTPATIENT
Start: 2020-07-15 | End: 2021-06-07

## 2020-07-15 NOTE — PROGRESS NOTES
"Subjective   Minnie Ross is a 74 y.o. female.     History of Present Illness     Chief Complaint:   Chief Complaint   Patient presents with   • medicare wellness   • Hypertension     med refill due - lab results  - colonoscopy due    • Arthritis       Minnie Ross 74 y.o. female who presents today for Medical Management of the below listed issues and medication refills.  she has a problem list of   Patient Active Problem List   Diagnosis   • Hypertension   • Eczema   • Primary osteoarthritis   • Chest pain   • Dyspnea on exertion   • Diaphoresis   • Pulmonary nodule   .  Since the last visit, she has overall felt well.  she has been compliant with   Current Outpatient Medications:   •  celecoxib (CeleBREX) 200 MG capsule, Take 1 capsule by mouth Daily., Disp: 90 capsule, Rfl: 3  •  amLODIPine-benazepril (LOTREL 5-20) 5-20 MG per capsule, Take 1 capsule by mouth Daily., Disp: 90 capsule, Rfl: 3  •  BIOTIN PO, Take  by mouth., Disp: , Rfl:   •  Multiple Vitamins-Minerals (ICAPS AREDS 2 PO), Take  by mouth., Disp: , Rfl:   •  MULTIPLE VITAMINS-MINERALS ER PO, Take  by mouth., Disp: , Rfl:   •  triamcinolone (KENALOG) 0.1 % cream, Apply  topically to the appropriate area as directed 2 (Two) Times a Day. Up to 14 days max, Disp: 15 g, Rfl: 1  •  TURMERIC PO, Take  by mouth., Disp: , Rfl: .  she denies medication side effects.    All of the other chronic condition(s) listed above are stable w/o issues.    /78   Pulse 74   Temp 97.6 °F (36.4 °C) (Oral)   Resp 16   Ht 160 cm (63\")   Wt 78.2 kg (172 lb 6.4 oz)   LMP  (LMP Unknown)   SpO2 97%   BMI 30.54 kg/m²     Results for orders placed or performed in visit on 07/02/20   Comprehensive metabolic panel   Result Value Ref Range    Glucose 91 65 - 99 mg/dL    BUN 14 8 - 23 mg/dL    Creatinine 0.82 0.57 - 1.00 mg/dL    Sodium 142 136 - 145 mmol/L    Potassium 4.3 3.5 - 5.2 mmol/L    Chloride 104 98 - 107 mmol/L    CO2 27.0 22.0 - 29.0 mmol/L    Calcium 9.9 " 8.6 - 10.5 mg/dL    Total Protein 7.4 6.0 - 8.5 g/dL    Albumin 4.40 3.50 - 5.20 g/dL    ALT (SGPT) 41 (H) 1 - 33 U/L    AST (SGOT) 32 1 - 32 U/L    Alkaline Phosphatase 78 39 - 117 U/L    Total Bilirubin 0.4 0.0 - 1.2 mg/dL    eGFR Non African Amer 69 >60 mL/min/1.73    Globulin 3.0 gm/dL    A/G Ratio 1.5 g/dL    BUN/Creatinine Ratio 17.1 7.0 - 25.0    Anion Gap 11.0 5.0 - 15.0 mmol/L   Lipid Panel With LDL/HDL Ratio   Result Value Ref Range    Total Cholesterol 192 0 - 200 mg/dL    Triglycerides 54 0 - 150 mg/dL    HDL Cholesterol 74 (H) 40 - 60 mg/dL    LDL Cholesterol  107 (H) 0 - 100 mg/dL    VLDL Cholesterol 10.8 5 - 40 mg/dL    LDL/HDL Ratio 1.45    TSH   Result Value Ref Range    TSH 1.170 0.270 - 4.200 uIU/mL   CBC Auto Differential   Result Value Ref Range    WBC 6.99 3.40 - 10.80 10*3/mm3    RBC 4.49 3.77 - 5.28 10*6/mm3    Hemoglobin 14.3 12.0 - 15.9 g/dL    Hematocrit 42.5 34.0 - 46.6 %    MCV 94.7 79.0 - 97.0 fL    MCH 31.8 26.6 - 33.0 pg    MCHC 33.6 31.5 - 35.7 g/dL    RDW 12.3 12.3 - 15.4 %    RDW-SD 42.9 37.0 - 54.0 fl    MPV 9.0 6.0 - 12.0 fL    Platelets 397 140 - 450 10*3/mm3    Neutrophil % 54.5 42.7 - 76.0 %    Lymphocyte % 35.3 19.6 - 45.3 %    Monocyte % 8.2 5.0 - 12.0 %    Eosinophil % 0.7 0.3 - 6.2 %    Basophil % 0.6 0.0 - 1.5 %    Immature Grans % 0.7 (H) 0.0 - 0.5 %    Neutrophils, Absolute 3.81 1.70 - 7.00 10*3/mm3    Lymphocytes, Absolute 2.47 0.70 - 3.10 10*3/mm3    Monocytes, Absolute 0.57 0.10 - 0.90 10*3/mm3    Eosinophils, Absolute 0.05 0.00 - 0.40 10*3/mm3    Basophils, Absolute 0.04 0.00 - 0.20 10*3/mm3    Immature Grans, Absolute 0.05 0.00 - 0.05 10*3/mm3    nRBC 0.0 0.0 - 0.2 /100 WBC     Does have some chronic bruising issues.      The following portions of the patient's history were reviewed and updated as appropriate: allergies, current medications, past family history, past medical history, past social history, past surgical history and problem list.    Review of Systems    Constitutional: Negative for activity change, chills, fatigue and fever.   Respiratory: Negative for cough and chest tightness.    Cardiovascular: Negative for chest pain and palpitations.   Gastrointestinal: Negative for abdominal pain and nausea.   Endocrine: Negative for cold intolerance.   Psychiatric/Behavioral: Negative for behavioral problems and dysphoric mood.       Objective   Physical Exam   Constitutional: She appears well-developed and well-nourished.   Neck: Neck supple. No thyromegaly present.   Cardiovascular: Normal rate and regular rhythm.   No murmur heard.  Pulmonary/Chest: Effort normal and breath sounds normal.   Abdominal: Bowel sounds are normal. There is no tenderness.   Neurological: She is alert.   Psychiatric: She has a normal mood and affect. Her behavior is normal.   Nursing note and vitals reviewed.  Labs reviewed with pt today during visit. All questions answered.      Assessment/Plan   Minnie was seen today for medicare wellness, hypertension and arthritis.    Diagnoses and all orders for this visit:    Medicare annual wellness visit, subsequent    Essential hypertension  -     amLODIPine-benazepril (LOTREL 5-20) 5-20 MG per capsule; Take 1 capsule by mouth Daily.    Bruising  -     Ambulatory Referral to Hematology    Screening for colon cancer  -     Ambulatory Referral to Gastroenterology

## 2020-07-15 NOTE — PROGRESS NOTES
The ABCs of the Annual Wellness Visit  Subsequent Medicare Wellness Visit    Chief Complaint   Patient presents with   • medicare wellness   • Hypertension     med refill due - lab results  - colonoscopy due    • Arthritis       Subjective   History of Present Illness:  Minnie Ross is a 74 y.o. female who presents for a Subsequent Medicare Wellness Visit.    HEALTH RISK ASSESSMENT    Recent Hospitalizations:  No hospitalization(s) within the last year.    Current Medical Providers:  Patient Care Team:  Andriy Nielson MD as PCP - General (Family Medicine)  Homero Gaston MD as PCP - Claims Attributed  Minnie Colindres MD as Consulting Physician (Dermatology)  Tammie Walsh APRN as Nurse Practitioner (Nurse Practitioner)  Blas Mai MD as Consulting Physician (Ophthalmology)    Smoking Status:  Social History     Tobacco Use   Smoking Status Former Smoker   • Last attempt to quit:    • Years since quittin.5   Smokeless Tobacco Never Used       Alcohol Consumption:  Social History     Substance and Sexual Activity   Alcohol Use Yes   • Alcohol/week: 14.0 standard drinks   • Types: 14 Glasses of wine per week    Comment: occasionally       Depression Screen:   PHQ-2/PHQ-9 Depression Screening 7/15/2020   Little interest or pleasure in doing things 0   Feeling down, depressed, or hopeless 0   Trouble falling or staying asleep, or sleeping too much -   Feeling tired or having little energy -   Poor appetite or overeating -   Feeling bad about yourself - or that you are a failure or have let yourself or your family down -   Trouble concentrating on things, such as reading the newspaper or watching television -   Moving or speaking so slowly that other people could have noticed. Or the opposite - being so fidgety or restless that you have been moving around a lot more than usual -   Thoughts that you would be better off dead, or of hurting yourself in some way -   Total Score 0       Fall Risk  Screen:  JEANETTE Fall Risk Assessment has not been completed.    Health Habits and Functional and Cognitive Screening:  Functional & Cognitive Status 7/15/2020   Do you have difficulty preparing food and eating? No   Do you have difficulty bathing yourself, getting dressed or grooming yourself? No   Do you have difficulty using the toilet? No   Do you have difficulty moving around from place to place? No   Do you have trouble with steps or getting out of a bed or a chair? No   Current Diet Well Balanced Diet   Dental Exam Up to date   Eye Exam Up to date   Exercise (times per week) -   Current Exercise Activities Include -   Do you need help using the phone?  No   Are you deaf or do you have serious difficulty hearing?  No   Do you need help with transportation? No   Do you need help shopping? No   Do you need help preparing meals?  No   Do you need help with housework?  No   Do you need help with laundry? No   Do you need help taking your medications? No   Do you need help managing money? No   Do you ever drive or ride in a car without wearing a seat belt? No   Have you felt unusual stress, anger or loneliness in the last month? No   Who do you live with? Spouse   If you need help, do you have trouble finding someone available to you? Yes   Have you been bothered in the last four weeks by sexual problems? No   Do you have difficulty concentrating, remembering or making decisions? No         Does the patient have evidence of cognitive impairment? No    Asprin use counseling:Does not need ASA (and currently is not on it)    Age-appropriate Screening Schedule:  Refer to the list below for future screening recommendations based on patient's age, sex and/or medical conditions. Orders for these recommended tests are listed in the plan section. The patient has been provided with a written plan.    Health Maintenance   Topic Date Due   • DXA SCAN  07/15/2020 (Originally 3/27/2020)   • COLONOSCOPY  08/20/2020 (Originally  3/2/2020)   • ZOSTER VACCINE (2 of 2) 07/24/2021 (Originally 2/26/2015)   • INFLUENZA VACCINE  08/01/2020   • PAP SMEAR  01/15/2021   • LIPID PANEL  07/09/2021   • MAMMOGRAM  12/31/2021   • TDAP/TD VACCINES (2 - Td) 05/01/2022          The following portions of the patient's history were reviewed and updated as appropriate: allergies, current medications, past family history, past medical history, past social history, past surgical history and problem list.    Outpatient Medications Prior to Visit   Medication Sig Dispense Refill   • celecoxib (CeleBREX) 200 MG capsule Take 1 capsule by mouth Daily. 90 capsule 3   • amLODIPine-benazepril (LOTREL 5-20) 5-20 MG per capsule Take 1 capsule by mouth Daily. 90 capsule 3   • BIOTIN PO Take  by mouth.     • Multiple Vitamins-Minerals (ICAPS AREDS 2 PO) Take  by mouth.     • MULTIPLE VITAMINS-MINERALS ER PO Take  by mouth.     • triamcinolone (KENALOG) 0.1 % cream Apply  topically to the appropriate area as directed 2 (Two) Times a Day. Up to 14 days max 15 g 1   • TURMERIC PO Take  by mouth.       No facility-administered medications prior to visit.        Patient Active Problem List   Diagnosis   • Hypertension   • Eczema   • Primary osteoarthritis   • Chest pain   • Dyspnea on exertion   • Diaphoresis   • Pulmonary nodule       Advanced Care Planning:  ACP discussion was held with the patient during this visit. Patient has an advance directive in EMR which is still valid.     Review of Systems    Compared to one year ago, the patient feels her physical health is the same.  Compared to one year ago, the patient feels her mental health is the same.    Reviewed chart for potential of high risk medication in the elderly: yes  Reviewed chart for potential of harmful drug interactions in the elderly:yes    Objective         Vitals:    07/15/20 0947   BP: 126/78   Pulse: 74   Resp: 16   Temp: 97.6 °F (36.4 °C)   TempSrc: Oral   SpO2: 97%   Weight: 78.2 kg (172 lb 6.4 oz)  "  Height: 160 cm (63\")   PainSc: 0-No pain       Body mass index is 30.54 kg/m².  Discussed the patient's BMI with her. The BMI is above average; BMI management plan is completed.    Physical Exam    Lab Results   Component Value Date    TRIG 54 07/09/2020    HDL 74 (H) 07/09/2020     (H) 07/09/2020    VLDL 10.8 07/09/2020        Assessment/Plan   Medicare Risks and Personalized Health Plan  CMS Preventative Services Quick Reference  Cardiovascular risk    The above risks/problems have been discussed with the patient.  Pertinent information has been shared with the patient in the After Visit Summary.  Follow up plans and orders are seen below in the Assessment/Plan Section.    Diagnoses and all orders for this visit:    1. Medicare annual wellness visit, subsequent (Primary)    2. Essential hypertension  -     amLODIPine-benazepril (LOTREL 5-20) 5-20 MG per capsule; Take 1 capsule by mouth Daily.  Dispense: 90 capsule; Refill: 3    3. Screening for colon cancer  -     Ambulatory Referral to Gastroenterology    Other orders  -     Cancel: celecoxib (CeleBREX) 200 MG capsule; Take 1 capsule by mouth Daily.  Dispense: 90 capsule; Refill: 3      Follow Up:  Return in about 1 year (around 7/15/2021) for Recheck.     An After Visit Summary and PPPS were given to the patient.             "

## 2020-07-15 NOTE — PATIENT INSTRUCTIONS
Medicare Wellness  Personal Prevention Plan of Service     Date of Office Visit:  07/15/2020  Encounter Provider:  Andriy Nielson MD  Place of Service:  [facility]  Patient Name: Minnie Ross  :  1945    As part of the Medicare Wellness portion of your visit today, we are providing you with this personalized preventive plan of services (PPPS). This plan is based upon recommendations of the [place] Preventive Services Task Force (USPSTF) and the Advisory Committee on Immunization Practices (ACIP).    This lists the preventive care services that should be considered, and provides dates of when you are due. Items listed as completed are up-to-date and do not require any further intervention.    Health Maintenance   Topic Date Due   • DXA SCAN  07/15/2020 (Originally 3/27/2020)   • COLONOSCOPY  2020 (Originally 3/2/2020)   • Pneumococcal Vaccine Once at 65 Years Old  10/12/2020 (Originally 2012)   • ZOSTER VACCINE (2 of 2) 2021 (Originally 2015)   • INFLUENZA VACCINE  2020   • PAP SMEAR  01/15/2021   • LIPID PANEL  2021   • MEDICARE ANNUAL WELLNESS  07/15/2021   • MAMMOGRAM  2021   • TDAP/TD VACCINES (2 - Td) 2022   • HEPATITIS C SCREENING  Discontinued       No orders of the defined types were placed in this encounter.      Return in about 1 year (around 7/15/2021) for Recheck.

## 2020-08-20 ENCOUNTER — LAB (OUTPATIENT)
Dept: LAB | Facility: HOSPITAL | Age: 75
End: 2020-08-20

## 2020-08-20 ENCOUNTER — CONSULT (OUTPATIENT)
Dept: ONCOLOGY | Facility: CLINIC | Age: 75
End: 2020-08-20

## 2020-08-20 VITALS
RESPIRATION RATE: 16 BRPM | SYSTOLIC BLOOD PRESSURE: 142 MMHG | OXYGEN SATURATION: 98 % | HEART RATE: 69 BPM | TEMPERATURE: 96.9 F | BODY MASS INDEX: 31.5 KG/M2 | HEIGHT: 63 IN | DIASTOLIC BLOOD PRESSURE: 76 MMHG | WEIGHT: 177.8 LBS

## 2020-08-20 DIAGNOSIS — T14.8XXA BRUISING: Primary | ICD-10-CM

## 2020-08-20 LAB
ALBUMIN SERPL-MCNC: 4.4 G/DL (ref 3.5–5.2)
ALBUMIN/GLOB SERPL: 1.5 G/DL (ref 1.1–2.4)
ALP SERPL-CCNC: 89 U/L (ref 38–116)
ALT SERPL W P-5'-P-CCNC: 39 U/L (ref 0–33)
ANION GAP SERPL CALCULATED.3IONS-SCNC: 11.7 MMOL/L (ref 5–15)
APTT PPP: 28.3 SECONDS (ref 22.7–35.4)
AST SERPL-CCNC: 39 U/L (ref 0–32)
BASOPHILS # BLD AUTO: 0.06 10*3/MM3 (ref 0–0.2)
BASOPHILS NFR BLD AUTO: 0.9 % (ref 0–1.5)
BILIRUB SERPL-MCNC: 0.5 MG/DL (ref 0.2–1.2)
BUN SERPL-MCNC: 14 MG/DL (ref 6–20)
BUN/CREAT SERPL: 20.6 (ref 7.3–30)
CALCIUM SPEC-SCNC: 10.2 MG/DL (ref 8.5–10.2)
CHLORIDE SERPL-SCNC: 103 MMOL/L (ref 98–107)
CLOSURE TME COLL+ADP BLD: 95 SECONDS (ref 52–122)
CLOSURE TME COLL+EPINEP BLD: 95 SECONDS (ref 68–148)
CO2 SERPL-SCNC: 24.3 MMOL/L (ref 22–29)
CREAT SERPL-MCNC: 0.68 MG/DL (ref 0.6–1.1)
DEPRECATED RDW RBC AUTO: 45.8 FL (ref 37–54)
EOSINOPHIL # BLD AUTO: 0.07 10*3/MM3 (ref 0–0.4)
EOSINOPHIL NFR BLD AUTO: 1 % (ref 0.3–6.2)
ERYTHROCYTE [DISTWIDTH] IN BLOOD BY AUTOMATED COUNT: 12.9 % (ref 12.3–15.4)
ERYTHROCYTE [SEDIMENTATION RATE] IN BLOOD: 19 MM/HR (ref 0–30)
FIBRINOGEN PPP-MCNC: 387 MG/DL (ref 219–464)
GFR SERPL CREATININE-BSD FRML MDRD: 85 ML/MIN/1.73
GLOBULIN UR ELPH-MCNC: 3 GM/DL (ref 1.8–3.5)
GLUCOSE SERPL-MCNC: 101 MG/DL (ref 74–124)
HCT VFR BLD AUTO: 42.9 % (ref 34–46.6)
HGB BLD-MCNC: 14.4 G/DL (ref 12–15.9)
IMM GRANULOCYTES # BLD AUTO: 0.04 10*3/MM3 (ref 0–0.05)
IMM GRANULOCYTES NFR BLD AUTO: 0.6 % (ref 0–0.5)
INR PPP: 0.95 (ref 0.9–1.1)
LYMPHOCYTES # BLD AUTO: 2.36 10*3/MM3 (ref 0.7–3.1)
LYMPHOCYTES NFR BLD AUTO: 33.6 % (ref 19.6–45.3)
MCH RBC QN AUTO: 32.1 PG (ref 26.6–33)
MCHC RBC AUTO-ENTMCNC: 33.6 G/DL (ref 31.5–35.7)
MCV RBC AUTO: 95.8 FL (ref 79–97)
MONOCYTES # BLD AUTO: 0.61 10*3/MM3 (ref 0.1–0.9)
MONOCYTES NFR BLD AUTO: 8.7 % (ref 5–12)
NEUTROPHILS NFR BLD AUTO: 3.89 10*3/MM3 (ref 1.7–7)
NEUTROPHILS NFR BLD AUTO: 55.2 % (ref 42.7–76)
NRBC BLD AUTO-RTO: 0 /100 WBC (ref 0–0.2)
PLATELET # BLD AUTO: 370 10*3/MM3 (ref 140–450)
PMV BLD AUTO: 8.7 FL (ref 6–12)
POTASSIUM SERPL-SCNC: 4.1 MMOL/L (ref 3.5–4.7)
PROT SERPL-MCNC: 7.4 G/DL (ref 6.3–8)
PROTHROMBIN TIME: 12.6 SECONDS (ref 11.7–14.2)
RBC # BLD AUTO: 4.48 10*6/MM3 (ref 3.77–5.28)
SODIUM SERPL-SCNC: 139 MMOL/L (ref 134–145)
WBC # BLD AUTO: 7.03 10*3/MM3 (ref 3.4–10.8)

## 2020-08-20 PROCEDURE — 85384 FIBRINOGEN ACTIVITY: CPT | Performed by: INTERNAL MEDICINE

## 2020-08-20 PROCEDURE — 85730 THROMBOPLASTIN TIME PARTIAL: CPT | Performed by: INTERNAL MEDICINE

## 2020-08-20 PROCEDURE — 85576 BLOOD PLATELET AGGREGATION: CPT | Performed by: INTERNAL MEDICINE

## 2020-08-20 PROCEDURE — 85652 RBC SED RATE AUTOMATED: CPT | Performed by: INTERNAL MEDICINE

## 2020-08-20 PROCEDURE — 99204 OFFICE O/P NEW MOD 45 MIN: CPT | Performed by: INTERNAL MEDICINE

## 2020-08-20 PROCEDURE — 85025 COMPLETE CBC W/AUTO DIFF WBC: CPT

## 2020-08-20 PROCEDURE — 80053 COMPREHEN METABOLIC PANEL: CPT | Performed by: INTERNAL MEDICINE

## 2020-08-20 PROCEDURE — 85610 PROTHROMBIN TIME: CPT | Performed by: INTERNAL MEDICINE

## 2020-08-20 PROCEDURE — 36415 COLL VENOUS BLD VENIPUNCTURE: CPT

## 2020-08-20 NOTE — PROGRESS NOTES
Subjective     REASON FOR CONSULTATION: Easy bruising.  Evaluate for coagulopathy.  Provide an opinion on any further workup or treatment                             REQUESTING PHYSICIAN: Andriy Nielson MD    RECORDS OBTAINED:  Records of the patients history including those obtained from the referring provider were reviewed and summarized in detail.    HISTORY OF PRESENT ILLNESS:  The patient is a 75 y.o. year old female who is here for an opinion about the above issue.  She is referred to us for evaluation of easy bruising.  She is noted scattered bruises primarily on her extremities without memorable trauma.  She is never had excess bleeding at surgeries or dental procedures.  She is not taking aspirin but she does take Celebrex for her arthritis.  She also undergoes occasional steroid injections of her joints for arthritis.    On exam today she has some small areas of purpura primarily on the sun exposed areas of the arms and legs.  She also has some spider veins on the lower extremities.  No large areas of ecchymosis.    History of Present Illness     Past Medical History:   Diagnosis Date   • Abnormal Pap smear of cervix    • Arthritis    • H/O complete eye exam 01/2018   • History of blood transfusion    • History of bone density study 04/2018   • Hypertension    • IBS (irritable bowel syndrome)         Past Surgical History:   Procedure Laterality Date   • APPENDECTOMY     • CHOLECYSTECTOMY     • KNEE SURGERY Right 2008    Homero Gaston MD   • MAMMO BILATERAL  2018   • PAP SMEAR  12/29/2016   • PARTIAL KNEE ARTHROPLASTY          Current Outpatient Medications on File Prior to Visit   Medication Sig Dispense Refill   • amLODIPine-benazepril (LOTREL 5-20) 5-20 MG per capsule Take 1 capsule by mouth Daily. 90 capsule 3   • BIOTIN PO Take  by mouth.     • celecoxib (CeleBREX) 200 MG capsule Take 1 capsule by mouth Daily. 90 capsule 3   • Multiple Vitamins-Minerals (ICAPS AREDS 2 PO) Take  by mouth.     •  MULTIPLE VITAMINS-MINERALS ER PO Take  by mouth.     • TURMERIC PO Take  by mouth.     • [DISCONTINUED] triamcinolone (KENALOG) 0.1 % cream Apply  topically to the appropriate area as directed 2 (Two) Times a Day. Up to 14 days max 15 g 1     No current facility-administered medications on file prior to visit.         ALLERGIES:  No Known Allergies     Social History     Socioeconomic History   • Marital status:      Spouse name: Julián   • Number of children: Not on file   • Years of education: Not on file   • Highest education level: Not on file   Occupational History     Employer: RETIRED   Tobacco Use   • Smoking status: Former Smoker     Last attempt to quit:      Years since quittin.6   • Smokeless tobacco: Never Used   Substance and Sexual Activity   • Alcohol use: Yes     Alcohol/week: 14.0 standard drinks     Types: 14 Glasses of wine per week     Comment: occasionally   • Drug use: No   • Sexual activity: Never     Partners: Male     Birth control/protection: Post-menopausal   Social History Narrative    New GYN pt         Family History   Problem Relation Age of Onset   • Hypertension Mother    • Heart disease Mother    • Deep vein thrombosis Brother         Review of Systems   Constitutional: Negative for activity change, chills, fatigue and fever.   HENT: Negative for mouth sores, trouble swallowing and voice change.    Eyes: Negative for pain and visual disturbance.   Respiratory: Negative for cough, shortness of breath and wheezing.    Cardiovascular: Negative for chest pain and palpitations.   Gastrointestinal: Negative for abdominal pain, constipation, diarrhea, nausea and vomiting.   Genitourinary: Negative for difficulty urinating, frequency and urgency.   Musculoskeletal: Negative for arthralgias and joint swelling.   Skin: Negative for rash.   Neurological: Negative for dizziness, seizures, weakness and headaches.   Hematological: Negative for adenopathy. Bruises/bleeds  "easily.   Psychiatric/Behavioral: Negative for behavioral problems and confusion. The patient is not nervous/anxious.         Objective     Vitals:    08/20/20 1012   BP: 142/76   Pulse: 69   Resp: 16   Temp: 96.9 °F (36.1 °C)   TempSrc: Skin   SpO2: 98%   Weight: 80.6 kg (177 lb 12.8 oz)   Height: 160 cm (62.99\")   PainSc: 0-No pain     Current Status 8/20/2020   ECOG score 0       Physical Exam   Constitutional: She is oriented to person, place, and time. She appears well-developed and well-nourished. No distress.   HENT:   Head: Normocephalic.   Eyes: Pupils are equal, round, and reactive to light. Conjunctivae and EOM are normal. No scleral icterus.   Neck: Normal range of motion. Neck supple. No JVD present. No thyromegaly present.   Cardiovascular: Normal rate and regular rhythm. Exam reveals no gallop and no friction rub.   No murmur heard.  Pulmonary/Chest: Effort normal and breath sounds normal. She has no wheezes. She has no rales.   Abdominal: Soft. She exhibits no distension and no mass. There is no tenderness.   Musculoskeletal: Normal range of motion. She exhibits no edema or deformity.   Lymphadenopathy:     She has no cervical adenopathy.   Neurological: She is alert and oriented to person, place, and time. She has normal reflexes. No cranial nerve deficit.   Skin: Skin is warm and dry. No rash noted. No erythema.   See HPI   Psychiatric: She has a normal mood and affect. Her behavior is normal. Judgment normal.         RECENT LABS:  Hematology WBC   Date Value Ref Range Status   08/20/2020 7.03 3.40 - 10.80 10*3/mm3 Final   05/03/2019 7.02 3.40 - 10.80 10*3/mm3 Final     RBC   Date Value Ref Range Status   08/20/2020 4.48 3.77 - 5.28 10*6/mm3 Final   05/03/2019 4.86 3.77 - 5.28 10*6/mm3 Final     Hemoglobin   Date Value Ref Range Status   08/20/2020 14.4 12.0 - 15.9 g/dL Final     Hematocrit   Date Value Ref Range Status   08/20/2020 42.9 34.0 - 46.6 % Final     Platelets   Date Value Ref Range " Status   08/20/2020 370 140 - 450 10*3/mm3 Final          Assessment/Plan     1.  Mild purpura on the extremities which seems to be consistent with senile purpura most likely.  She does not have a history of abnormal bleeding with dental procedures or surgeries in the past.  She is taking Celebrex which could be affecting her platelet function to some degree.  She also takes steroid injections in her joints for arthritis which over time could also cause some changes in vascular permeability.    Recommendations  1.  Patient will return to our lab today for additional testing including a von Willebrand panel, PT/INR, PTT, fibrinogen, platelet function analysis, erythrocyte sedimentation rate, and STEVE.  2.  We will review the results of the labs as they filter in and contact patient if we see any significant abnormalities.  3.  Otherwise she will return to see us in 4 weeks for review of all of the lab results and any further recommendations.  She will have another blood count at that time as well.    Thanks for allowing us to see this nice patient in consultation.

## 2020-08-21 LAB — ANA SER QL: NEGATIVE

## 2020-08-22 LAB
FACT VIII ACT/NOR PPP: 150 % (ref 56–140)
VW INTERPRETATION: NORMAL
VWF AG ACT/NOR PPP IA: 147 % (ref 50–200)
VWF CP PPP QL: 100 % (ref 50–200)

## 2020-09-16 ENCOUNTER — HOSPITAL ENCOUNTER (OUTPATIENT)
Dept: CT IMAGING | Facility: HOSPITAL | Age: 75
Discharge: HOME | End: 2020-09-16
Admitting: INTERNAL MEDICINE

## 2020-09-16 DIAGNOSIS — R91.1 LUNG NODULE: ICD-10-CM

## 2020-09-16 PROCEDURE — 71250 CT THORAX DX C-: CPT

## 2020-09-17 ENCOUNTER — LAB (OUTPATIENT)
Dept: LAB | Facility: HOSPITAL | Age: 75
End: 2020-09-17

## 2020-09-17 ENCOUNTER — OFFICE VISIT (OUTPATIENT)
Dept: ONCOLOGY | Facility: CLINIC | Age: 75
End: 2020-09-17

## 2020-09-17 VITALS
WEIGHT: 173.7 LBS | HEART RATE: 76 BPM | RESPIRATION RATE: 19 BRPM | BODY MASS INDEX: 30.78 KG/M2 | DIASTOLIC BLOOD PRESSURE: 61 MMHG | TEMPERATURE: 97.9 F | SYSTOLIC BLOOD PRESSURE: 117 MMHG | OXYGEN SATURATION: 97 % | HEIGHT: 63 IN

## 2020-09-17 DIAGNOSIS — T14.8XXA BRUISING: Primary | ICD-10-CM

## 2020-09-17 LAB
BASOPHILS # BLD AUTO: 0.05 10*3/MM3 (ref 0–0.2)
BASOPHILS NFR BLD AUTO: 0.5 % (ref 0–1.5)
DEPRECATED RDW RBC AUTO: 42.5 FL (ref 37–54)
EOSINOPHIL # BLD AUTO: 0.24 10*3/MM3 (ref 0–0.4)
EOSINOPHIL NFR BLD AUTO: 2.6 % (ref 0.3–6.2)
ERYTHROCYTE [DISTWIDTH] IN BLOOD BY AUTOMATED COUNT: 12.1 % (ref 12.3–15.4)
HCT VFR BLD AUTO: 43.4 % (ref 34–46.6)
HGB BLD-MCNC: 14.7 G/DL (ref 12–15.9)
IMM GRANULOCYTES # BLD AUTO: 0.06 10*3/MM3 (ref 0–0.05)
IMM GRANULOCYTES NFR BLD AUTO: 0.6 % (ref 0–0.5)
LYMPHOCYTES # BLD AUTO: 2.18 10*3/MM3 (ref 0.7–3.1)
LYMPHOCYTES NFR BLD AUTO: 23.5 % (ref 19.6–45.3)
MCH RBC QN AUTO: 32.5 PG (ref 26.6–33)
MCHC RBC AUTO-ENTMCNC: 33.9 G/DL (ref 31.5–35.7)
MCV RBC AUTO: 96 FL (ref 79–97)
MONOCYTES # BLD AUTO: 0.89 10*3/MM3 (ref 0.1–0.9)
MONOCYTES NFR BLD AUTO: 9.6 % (ref 5–12)
NEUTROPHILS NFR BLD AUTO: 5.85 10*3/MM3 (ref 1.7–7)
NEUTROPHILS NFR BLD AUTO: 63.2 % (ref 42.7–76)
NRBC BLD AUTO-RTO: 0 /100 WBC (ref 0–0.2)
PLATELET # BLD AUTO: 262 10*3/MM3 (ref 140–450)
PMV BLD AUTO: 9.3 FL (ref 6–12)
RBC # BLD AUTO: 4.52 10*6/MM3 (ref 3.77–5.28)
WBC # BLD AUTO: 9.27 10*3/MM3 (ref 3.4–10.8)

## 2020-09-17 PROCEDURE — 99214 OFFICE O/P EST MOD 30 MIN: CPT | Performed by: INTERNAL MEDICINE

## 2020-09-17 PROCEDURE — 85025 COMPLETE CBC W/AUTO DIFF WBC: CPT

## 2020-09-17 PROCEDURE — 36415 COLL VENOUS BLD VENIPUNCTURE: CPT

## 2020-09-17 NOTE — PROGRESS NOTES
Subjective     REASON FOR CONSULTATION: Easy bruising.  Negative laboratory evaluation for coagulopathy or vasculitis.      HISTORY OF PRESENT ILLNESS:  The patient is a 75 y.o. year old female who was referred to us for evaluation of easy bruising.  She has noted scattered bruises primarily on her extremities without memorable trauma.  She had never had excess bleeding at surgeries or dental procedures.  She is not taking aspirin but she does take Celebrex for her arthritis.  She also undergoes occasional steroid injections of her joints for arthritis.    On exam today she still has some small areas of purpura primarily on the sun exposed areas of the arms and legs.  She also has some spider veins on the lower extremities.  No large areas of ecchymosis.    After initial consult visit of 8/20/2020, we sari an extensive laboratory evaluation for acquired or inherited coagulopathies and also some screening labs for vasculitis.  She returns today to discuss those results.  Thankfully there were no significant abnormalities including no evidence of von Willebrand's disease, normal platelet function studies, normal fibrinogen, her STEVE and erythrocyte sedimentation rate were normal also.  We shared these results with the patient today and explained that we feel she does not have any type of coagulopathy or obvious vasculitis but her bruising may be more mechanical on the basis of senile purpura particularly on the sun exposed areas of her arms.    History of Present Illness     Past Medical History:   Diagnosis Date   • Abnormal Pap smear of cervix    • Arthritis    • Cataract    • H/O complete eye exam 01/2018   • History of blood transfusion    • History of bone density study 04/2018   • Hypertension    • IBS (irritable bowel syndrome)         Past Surgical History:   Procedure Laterality Date   • APPENDECTOMY  1960   • CATARACT EXTRACTION  2019   • CHOLECYSTECTOMY  1975   • KNEE SURGERY Right 2008    Homero Gaston MD    • MAMMO BILATERAL     • MOUTH SURGERY     • PAP SMEAR  2016   • PARTIAL KNEE ARTHROPLASTY          Current Outpatient Medications on File Prior to Visit   Medication Sig Dispense Refill   • amLODIPine-benazepril (LOTREL 5-20) 5-20 MG per capsule Take 1 capsule by mouth Daily. 90 capsule 3   • azithromycin (ZITHROMAX) 250 MG tablet Take 2 tablets the first day, then 1 tablet daily for 4 days. 6 tablet 0   • BIOTIN PO Take  by mouth.     • celecoxib (CeleBREX) 200 MG capsule Take 1 capsule by mouth Daily. 90 capsule 3   • Multiple Vitamins-Minerals (ICAPS AREDS 2 PO) Take  by mouth.     • MULTIPLE VITAMINS-MINERALS ER PO Take  by mouth.     • TURMERIC PO Take  by mouth.     • [] promethazine-dextromethorphan (PROMETHAZINE-DM) 6.25-15 MG/5ML syrup Take 5 mL by mouth 4 (Four) Times a Day As Needed for Cough for up to 5 days. 118 mL 0     No current facility-administered medications on file prior to visit.         ALLERGIES:  No Known Allergies     Social History     Socioeconomic History   • Marital status:      Spouse name: Julián   • Number of children: 1   • Years of education: Not on file   • Highest education level: Not on file   Occupational History     Employer: RETIRED   Tobacco Use   • Smoking status: Former Smoker     Types: Cigarettes     Quit date:      Years since quittin.7   • Smokeless tobacco: Never Used   Substance and Sexual Activity   • Alcohol use: Yes     Alcohol/week: 14.0 standard drinks     Types: 14 Glasses of wine per week     Comment: occasionally   • Drug use: No   • Sexual activity: Never     Partners: Male     Birth control/protection: Post-menopausal   Social History Narrative    New GYN pt         Family History   Problem Relation Age of Onset   • Hypertension Mother    • Heart disease Mother    • Arthritis Mother    • Deep vein thrombosis Brother    • Hyperlipidemia Sister         Review of Systems   Constitutional: Negative for activity  "change, chills, fatigue and fever.   HENT: Negative for mouth sores, trouble swallowing and voice change.    Eyes: Negative for pain and visual disturbance.   Respiratory: Negative for cough, shortness of breath and wheezing.    Cardiovascular: Negative for chest pain and palpitations.   Gastrointestinal: Negative for abdominal pain, constipation, diarrhea, nausea and vomiting.   Genitourinary: Negative for difficulty urinating, frequency and urgency.   Musculoskeletal: Negative for arthralgias and joint swelling.   Skin: Negative for rash.   Neurological: Negative for dizziness, seizures, weakness and headaches.   Hematological: Negative for adenopathy. Bruises/bleeds easily.   Psychiatric/Behavioral: Negative for behavioral problems and confusion. The patient is not nervous/anxious.    Reviewed and unchanged on the visit of 9/17/2020    Objective     Vitals:    09/17/20 0851   BP: 117/61   Pulse: 76   Resp: 19   Temp: 97.9 °F (36.6 °C)   TempSrc: Temporal   SpO2: 97%   Weight: 78.8 kg (173 lb 11.2 oz)   Height: 160 cm (62.99\")   PainSc: 0-No pain     Current Status 9/17/2020   ECOG score 0       Physical Exam   Constitutional: She is oriented to person, place, and time. She appears well-developed. No distress.   HENT:   Head: Normocephalic.   Eyes: Pupils are equal, round, and reactive to light. Conjunctivae are normal. No scleral icterus.   Neck: Normal range of motion. Neck supple. No JVD present. No thyromegaly present.   Cardiovascular: Normal rate and regular rhythm. Exam reveals no gallop and no friction rub.   No murmur heard.  Pulmonary/Chest: Effort normal and breath sounds normal. She has no wheezes. She has no rales.   Abdominal: Soft. She exhibits no distension and no mass. There is no abdominal tenderness.   Musculoskeletal: Normal range of motion. No deformity.   Lymphadenopathy:     She has no cervical adenopathy.   Neurological: She is alert and oriented to person, place, and time. She has normal " reflexes. No cranial nerve deficit.   Skin: Skin is warm and dry. No rash noted. No erythema.   See HPI   Psychiatric: Her behavior is normal. Judgment normal.   Repeated and unchanged on the visit of 9/17/2020    RECENT LABS:  Hematology WBC   Date Value Ref Range Status   09/17/2020 9.27 3.40 - 10.80 10*3/mm3 Final   05/03/2019 7.02 3.40 - 10.80 10*3/mm3 Final     RBC   Date Value Ref Range Status   09/17/2020 4.52 3.77 - 5.28 10*6/mm3 Final   05/03/2019 4.86 3.77 - 5.28 10*6/mm3 Final     Hemoglobin   Date Value Ref Range Status   09/17/2020 14.7 12.0 - 15.9 g/dL Final     Hematocrit   Date Value Ref Range Status   09/17/2020 43.4 34.0 - 46.6 % Final     Platelets   Date Value Ref Range Status   09/17/2020 262 140 - 450 10*3/mm3 Final          Assessment/Plan     1.  Mild purpura on the extremities which seems to be consistent with senile purpura most likely.  She does not have a history of abnormal bleeding with dental procedures or surgeries in the past.  She is taking Celebrex which could be affecting her platelet function to some degree.  She also takes steroid injections in her joints for arthritis which over time could also cause some changes in vascular permeability.  2.  Laboratory coagulopathy work-up after her initial consult of 8/20/2020 was completely normal.    PLAN  1.  We reviewed the lab results with the patient in the office today and provided her printed copy of the reports.  We explained that we do not see any evidence of coagulopathy and suspect that her bruising is more mechanical.  2.  We do not plan to follow her routinely in our office but certainly would be happy to see her again anytime in the future if new concerns arise.    Thanks for allowing us to see this nice patient in consultation.

## 2020-10-12 ENCOUNTER — TRANSCRIBE ORDERS (OUTPATIENT)
Dept: ADMINISTRATIVE | Facility: HOSPITAL | Age: 75
End: 2020-10-12

## 2020-10-12 DIAGNOSIS — R91.1 LUNG NODULE: Primary | ICD-10-CM

## 2020-12-01 ENCOUNTER — AMBULATORY SURGICAL CENTER (OUTPATIENT)
Dept: URBAN - METROPOLITAN AREA SURGERY 17 | Facility: SURGERY | Age: 75
End: 2020-12-01
Payer: MEDICARE

## 2020-12-01 VITALS
HEART RATE: 70 BPM | SYSTOLIC BLOOD PRESSURE: 123 MMHG | SYSTOLIC BLOOD PRESSURE: 145 MMHG | SYSTOLIC BLOOD PRESSURE: 143 MMHG | OXYGEN SATURATION: 92 % | HEART RATE: 82 BPM | SYSTOLIC BLOOD PRESSURE: 157 MMHG | DIASTOLIC BLOOD PRESSURE: 74 MMHG | HEIGHT: 63 IN | RESPIRATION RATE: 8 BRPM | SYSTOLIC BLOOD PRESSURE: 129 MMHG | DIASTOLIC BLOOD PRESSURE: 89 MMHG | HEART RATE: 78 BPM | DIASTOLIC BLOOD PRESSURE: 63 MMHG | HEART RATE: 86 BPM | OXYGEN SATURATION: 100 % | RESPIRATION RATE: 11 BRPM | OXYGEN SATURATION: 98 % | OXYGEN SATURATION: 96 % | OXYGEN SATURATION: 97 % | DIASTOLIC BLOOD PRESSURE: 54 MMHG | DIASTOLIC BLOOD PRESSURE: 78 MMHG | HEART RATE: 66 BPM | RESPIRATION RATE: 21 BRPM | DIASTOLIC BLOOD PRESSURE: 84 MMHG | HEART RATE: 68 BPM | OXYGEN SATURATION: 95 % | HEART RATE: 98 BPM | HEART RATE: 74 BPM | RESPIRATION RATE: 12 BRPM | DIASTOLIC BLOOD PRESSURE: 75 MMHG | HEART RATE: 80 BPM | TEMPERATURE: 97.1 F | SYSTOLIC BLOOD PRESSURE: 134 MMHG | SYSTOLIC BLOOD PRESSURE: 149 MMHG | SYSTOLIC BLOOD PRESSURE: 126 MMHG | SYSTOLIC BLOOD PRESSURE: 108 MMHG | DIASTOLIC BLOOD PRESSURE: 72 MMHG | SYSTOLIC BLOOD PRESSURE: 97 MMHG | DIASTOLIC BLOOD PRESSURE: 80 MMHG | RESPIRATION RATE: 18 BRPM | WEIGHT: 169 LBS | HEART RATE: 81 BPM | TEMPERATURE: 97.3 F

## 2020-12-01 DIAGNOSIS — Z12.11 ENCOUNTER FOR SCREENING FOR MALIGNANT NEOPLASM OF COLON: ICD-10-CM

## 2020-12-01 DIAGNOSIS — K64.8 OTHER HEMORRHOIDS: ICD-10-CM

## 2020-12-01 PROCEDURE — G0121 COLON CA SCRN NOT HI RSK IND: HCPCS | Performed by: INTERNAL MEDICINE

## 2021-01-06 ENCOUNTER — APPOINTMENT (OUTPATIENT)
Dept: WOMENS IMAGING | Facility: HOSPITAL | Age: 76
End: 2021-01-06

## 2021-01-06 PROCEDURE — 77063 BREAST TOMOSYNTHESIS BI: CPT | Performed by: RADIOLOGY

## 2021-01-06 PROCEDURE — 77067 SCR MAMMO BI INCL CAD: CPT | Performed by: RADIOLOGY

## 2021-01-18 ENCOUNTER — HOSPITAL ENCOUNTER (OUTPATIENT)
Dept: CT IMAGING | Facility: HOSPITAL | Age: 76
Discharge: HOME | End: 2021-01-18
Admitting: INTERNAL MEDICINE

## 2021-01-18 DIAGNOSIS — R91.1 LUNG NODULE: ICD-10-CM

## 2021-01-18 PROCEDURE — 71250 CT THORAX DX C-: CPT

## 2021-02-17 ENCOUNTER — TRANSCRIBE ORDERS (OUTPATIENT)
Dept: ADMINISTRATIVE | Facility: HOSPITAL | Age: 76
End: 2021-02-17

## 2021-02-17 DIAGNOSIS — R91.8 LUNG NODULES: Primary | ICD-10-CM

## 2021-03-09 DIAGNOSIS — Z23 IMMUNIZATION DUE: ICD-10-CM

## 2021-03-26 RX ORDER — CELECOXIB 200 MG/1
200 CAPSULE ORAL DAILY
Qty: 90 CAPSULE | Refills: 0 | Status: SHIPPED | OUTPATIENT
Start: 2021-03-26 | End: 2021-04-04 | Stop reason: SDUPTHER

## 2021-03-31 ENCOUNTER — PATIENT MESSAGE (OUTPATIENT)
Dept: FAMILY MEDICINE CLINIC | Facility: CLINIC | Age: 76
End: 2021-03-31

## 2021-03-31 DIAGNOSIS — M19.91 PRIMARY OSTEOARTHRITIS, UNSPECIFIED SITE: Primary | ICD-10-CM

## 2021-04-04 RX ORDER — CELECOXIB 200 MG/1
200 CAPSULE ORAL DAILY
Qty: 90 CAPSULE | Refills: 0 | Status: SHIPPED | OUTPATIENT
Start: 2021-04-04 | End: 2021-07-01 | Stop reason: SDUPTHER

## 2021-04-04 NOTE — TELEPHONE ENCOUNTER
From: Minnie Ross  To: Andriy Nielson MD  Sent: 3/31/2021 7:36 PM EDT  Subject: Prescription Question    I requested a new prescription for CelecoxiB Cap 200MG and it was processed through the mail order [facility]....(Thank You). I received notification that the charge would be $101.25. I thought that to be extremely high and found that [facility] had the medicine for $17.35 for a 90 day supply. I have cancelled the order through "[employer] and have received authorization to return the meds that came today. I am requesting a new prescription issued to [facility] or a paper authorization for said meds. Very sorry for the inconvenience..........

## 2021-05-19 NOTE — PROGRESS NOTES
"Subjective   Minnie Ross is a 75 y.o. female.     CC: Back Pain    History of Present Illness     Pt comes in today c/o Thoracic upper right back pain with \"spasms\" after riding her recumbent bike. Responding to heat and NSAIDS.      The following portions of the patient's history were reviewed and updated as appropriate: allergies, current medications, past family history, past medical history, past social history, past surgical history and problem list.    Review of Systems   Constitutional: Negative for activity change, chills and fever.   Respiratory: Negative for cough.    Cardiovascular: Negative for chest pain.   Musculoskeletal: Positive for back pain.   Neurological: Negative for weakness and numbness.   Psychiatric/Behavioral: Negative for dysphoric mood.       /64   Pulse 68   Temp 97.8 °F (36.6 °C) (Oral)   Resp 16   Ht 160 cm (62.99\")   Wt 79.4 kg (175 lb)   LMP  (LMP Unknown)   SpO2 98%   BMI 31.01 kg/m²     Objective   Physical Exam  Constitutional:       General: She is not in acute distress.     Appearance: She is well-developed.   Pulmonary:      Effort: Pulmonary effort is normal.   Musculoskeletal:         General: Tenderness (right Rhomboid region) present.   Neurological:      Mental Status: She is alert and oriented to person, place, and time.   Psychiatric:         Behavior: Behavior normal.         Thought Content: Thought content normal.         Assessment/Plan   Diagnoses and all orders for this visit:    1. Back strain, initial encounter (Primary)  -     cyclobenzaprine (FLEXERIL) 10 MG tablet; Take 1 tablet by mouth 2 (Two) Times a Day As Needed for Muscle Spasms.  Dispense: 20 tablet; Refill: 2    Heat/stretching discussed.           "

## 2021-05-20 ENCOUNTER — OFFICE VISIT (OUTPATIENT)
Dept: FAMILY MEDICINE CLINIC | Facility: CLINIC | Age: 76
End: 2021-05-20

## 2021-05-20 VITALS
TEMPERATURE: 97.8 F | HEART RATE: 68 BPM | RESPIRATION RATE: 16 BRPM | WEIGHT: 175 LBS | HEIGHT: 63 IN | OXYGEN SATURATION: 98 % | BODY MASS INDEX: 31.01 KG/M2 | SYSTOLIC BLOOD PRESSURE: 128 MMHG | DIASTOLIC BLOOD PRESSURE: 64 MMHG

## 2021-05-20 DIAGNOSIS — S39.012A BACK STRAIN, INITIAL ENCOUNTER: Primary | ICD-10-CM

## 2021-05-20 PROCEDURE — 99213 OFFICE O/P EST LOW 20 MIN: CPT | Performed by: FAMILY MEDICINE

## 2021-05-20 RX ORDER — CYCLOBENZAPRINE HCL 10 MG
10 TABLET ORAL 2 TIMES DAILY PRN
Qty: 20 TABLET | Refills: 2 | Status: SHIPPED | OUTPATIENT
Start: 2021-05-20 | End: 2022-07-14

## 2021-05-25 ENCOUNTER — TELEPHONE (OUTPATIENT)
Dept: FAMILY MEDICINE CLINIC | Facility: CLINIC | Age: 76
End: 2021-05-25

## 2021-06-06 DIAGNOSIS — I10 ESSENTIAL HYPERTENSION: ICD-10-CM

## 2021-06-07 RX ORDER — AMLODIPINE BESYLATE AND BENAZEPRIL HYDROCHLORIDE 5; 20 MG/1; MG/1
CAPSULE ORAL
Qty: 30 CAPSULE | Refills: 0 | Status: SHIPPED | OUTPATIENT
Start: 2021-06-07 | End: 2021-07-01 | Stop reason: SDUPTHER

## 2021-07-01 ENCOUNTER — TELEPHONE (OUTPATIENT)
Dept: FAMILY MEDICINE CLINIC | Facility: CLINIC | Age: 76
End: 2021-07-01

## 2021-07-01 DIAGNOSIS — R61 DIAPHORESIS: ICD-10-CM

## 2021-07-01 DIAGNOSIS — I10 ESSENTIAL HYPERTENSION: Primary | ICD-10-CM

## 2021-07-01 DIAGNOSIS — R91.1 PULMONARY NODULE: ICD-10-CM

## 2021-07-01 NOTE — TELEPHONE ENCOUNTER
PATIENT CALLED AND WANTS TO KNOW IF SHE NEEDS TO HAVE LABS DONE AT HER NEXT VISIT OF 7/14/21 FOR MED REFILLS.   SHE IS DUE FOR A MEDICARE WELLNESS VISIT. NEXT APPOINTMENT IS 12/23/21    PLEASE MESSAGE IN [employer].     CALL BACK NUMBER [phone]

## 2021-07-08 LAB
ALBUMIN SERPL-MCNC: 4.9 G/DL (ref 3.5–5.2)
ALBUMIN/GLOB SERPL: 2.3 G/DL
ALP SERPL-CCNC: 107 U/L (ref 39–117)
ALT SERPL-CCNC: 27 U/L (ref 1–33)
AST SERPL-CCNC: 29 U/L (ref 1–32)
BASOPHILS # BLD AUTO: 0.05 10*3/MM3 (ref 0–0.2)
BASOPHILS NFR BLD AUTO: 0.9 % (ref 0–1.5)
BILIRUB SERPL-MCNC: 0.6 MG/DL (ref 0–1.2)
BUN SERPL-MCNC: 17 MG/DL (ref 8–23)
BUN/CREAT SERPL: 21.5 (ref 7–25)
CALCIUM SERPL-MCNC: 10 MG/DL (ref 8.6–10.5)
CHLORIDE SERPL-SCNC: 105 MMOL/L (ref 98–107)
CHOLEST SERPL-MCNC: 186 MG/DL (ref 0–200)
CO2 SERPL-SCNC: 26 MMOL/L (ref 22–29)
CREAT SERPL-MCNC: 0.79 MG/DL (ref 0.57–1)
EOSINOPHIL # BLD AUTO: 0.11 10*3/MM3 (ref 0–0.4)
EOSINOPHIL NFR BLD AUTO: 1.9 % (ref 0.3–6.2)
ERYTHROCYTE [DISTWIDTH] IN BLOOD BY AUTOMATED COUNT: 11.6 % (ref 12.3–15.4)
GLOBULIN SER CALC-MCNC: 2.1 GM/DL
GLUCOSE SERPL-MCNC: 86 MG/DL (ref 65–99)
HCT VFR BLD AUTO: 43.4 % (ref 34–46.6)
HDLC SERPL-MCNC: 58 MG/DL (ref 40–60)
HGB BLD-MCNC: 14.6 G/DL (ref 12–15.9)
IMM GRANULOCYTES # BLD AUTO: 0.02 10*3/MM3 (ref 0–0.05)
IMM GRANULOCYTES NFR BLD AUTO: 0.3 % (ref 0–0.5)
LDLC SERPL CALC-MCNC: 117 MG/DL (ref 0–100)
LYMPHOCYTES # BLD AUTO: 2.48 10*3/MM3 (ref 0.7–3.1)
LYMPHOCYTES NFR BLD AUTO: 42.9 % (ref 19.6–45.3)
MCH RBC QN AUTO: 31 PG (ref 26.6–33)
MCHC RBC AUTO-ENTMCNC: 33.6 G/DL (ref 31.5–35.7)
MCV RBC AUTO: 92.1 FL (ref 79–97)
MONOCYTES # BLD AUTO: 0.52 10*3/MM3 (ref 0.1–0.9)
MONOCYTES NFR BLD AUTO: 9 % (ref 5–12)
NEUTROPHILS # BLD AUTO: 2.6 10*3/MM3 (ref 1.7–7)
NEUTROPHILS NFR BLD AUTO: 45 % (ref 42.7–76)
NRBC BLD AUTO-RTO: 0 /100 WBC (ref 0–0.2)
PLATELET # BLD AUTO: 377 10*3/MM3 (ref 140–450)
POTASSIUM SERPL-SCNC: 4.7 MMOL/L (ref 3.5–5.2)
PROT SERPL-MCNC: 7 G/DL (ref 6–8.5)
RBC # BLD AUTO: 4.71 10*6/MM3 (ref 3.77–5.28)
SODIUM SERPL-SCNC: 144 MMOL/L (ref 136–145)
TRIGL SERPL-MCNC: 56 MG/DL (ref 0–150)
TSH SERPL DL<=0.005 MIU/L-ACNC: 1.3 UIU/ML (ref 0.27–4.2)
VLDLC SERPL CALC-MCNC: 11 MG/DL (ref 5–40)
WBC # BLD AUTO: 5.78 10*3/MM3 (ref 3.4–10.8)

## 2021-07-13 NOTE — PROGRESS NOTES
"Subjective   Minnie Ross is a 75 y.o. female.     History of Present Illness     Chief Complaint:   Chief Complaint   Patient presents with   • Hypertension     med refill due    • Arthritis      MULTIPLE PHARM        Minnie Ross 75 y.o. female who presents today for Medical Management of the below listed issues and medication refills.  she has a problem list of   Patient Active Problem List   Diagnosis   • Hypertension   • Eczema   • Primary osteoarthritis   • Chest pain   • Dyspnea on exertion   • Diaphoresis   • Pulmonary nodule   • Bruising   .  Since the last visit, she has overall felt well.  she has been compliant with   Current Outpatient Medications:   •  amLODIPine-benazepril (LOTREL 5-20) 5-20 MG per capsule, Take 1 capsule by mouth Daily., Disp: 90 capsule, Rfl: 1  •  celecoxib (CeleBREX) 200 MG capsule, Take 1 capsule by mouth Daily., Disp: 90 capsule, Rfl: 1  •  azithromycin (ZITHROMAX) 250 MG tablet, Take 2 tablets the first day, then 1 tablet daily for 4 days., Disp: 6 tablet, Rfl: 0  •  BIOTIN PO, Take  by mouth., Disp: , Rfl:   •  cyclobenzaprine (FLEXERIL) 10 MG tablet, Take 1 tablet by mouth 2 (Two) Times a Day As Needed for Muscle Spasms., Disp: 20 tablet, Rfl: 2  •  Multiple Vitamins-Minerals (ICAPS AREDS 2 PO), Take  by mouth., Disp: , Rfl:   •  MULTIPLE VITAMINS-MINERALS ER PO, Take  by mouth., Disp: , Rfl:   •  TURMERIC PO, Take  by mouth., Disp: , Rfl: .  she denies medication side effects.    All of the other chronic condition(s) listed above are stable w/o issues.    /64   Pulse 66   Temp 97.4 °F (36.3 °C) (Oral)   Resp 14   Ht 160 cm (62.99\")   Wt 78 kg (172 lb)   LMP  (LMP Unknown)   BMI 30.48 kg/m²     Results for orders placed or performed in visit on 07/01/21   Comprehensive metabolic panel    Specimen: Blood   Result Value Ref Range    Glucose 86 65 - 99 mg/dL    BUN 17 8 - 23 mg/dL    Creatinine 0.79 0.57 - 1.00 mg/dL    eGFR Non African Am 71 >60 mL/min/1.73 "    eGFR African Am 86 >60 mL/min/1.73    BUN/Creatinine Ratio 21.5 7.0 - 25.0    Sodium 144 136 - 145 mmol/L    Potassium 4.7 3.5 - 5.2 mmol/L    Chloride 105 98 - 107 mmol/L    Total CO2 26.0 22.0 - 29.0 mmol/L    Calcium 10.0 8.6 - 10.5 mg/dL    Total Protein 7.0 6.0 - 8.5 g/dL    Albumin 4.90 3.50 - 5.20 g/dL    Globulin 2.1 gm/dL    A/G Ratio 2.3 g/dL    Total Bilirubin 0.6 0.0 - 1.2 mg/dL    Alkaline Phosphatase 107 39 - 117 U/L    AST (SGOT) 29 1 - 32 U/L    ALT (SGPT) 27 1 - 33 U/L   Lipid panel    Specimen: Blood   Result Value Ref Range    Total Cholesterol 186 0 - 200 mg/dL    Triglycerides 56 0 - 150 mg/dL    HDL Cholesterol 58 40 - 60 mg/dL    VLDL Cholesterol Heladio 11 5 - 40 mg/dL    LDL Chol Calc (NIH) 117 (H) 0 - 100 mg/dL   TSH    Specimen: Blood   Result Value Ref Range    TSH 1.300 0.270 - 4.200 uIU/mL   CBC w MANUAL Differential    Specimen: Blood   Result Value Ref Range    WBC 5.78 3.40 - 10.80 10*3/mm3    RBC 4.71 3.77 - 5.28 10*6/mm3    Hemoglobin 14.6 12.0 - 15.9 g/dL    Hematocrit 43.4 34.0 - 46.6 %    MCV 92.1 79.0 - 97.0 fL    MCH 31.0 26.6 - 33.0 pg    MCHC 33.6 31.5 - 35.7 g/dL    RDW 11.6 (L) 12.3 - 15.4 %    Platelets 377 140 - 450 10*3/mm3    Neutrophil Rel % 45.0 42.7 - 76.0 %    Lymphocyte Rel % 42.9 19.6 - 45.3 %    Monocyte Rel % 9.0 5.0 - 12.0 %    Eosinophil Rel % 1.9 0.3 - 6.2 %    Basophil Rel % 0.9 0.0 - 1.5 %    Neutrophils Absolute 2.60 1.70 - 7.00 10*3/mm3    Lymphocytes Absolute 2.48 0.70 - 3.10 10*3/mm3    Monocytes Absolute 0.52 0.10 - 0.90 10*3/mm3    Eosinophils Absolute 0.11 0.00 - 0.40 10*3/mm3    Basophils Absolute 0.05 0.00 - 0.20 10*3/mm3    Immature Granulocyte Rel % 0.3 0.0 - 0.5 %    Immature Grans Absolute 0.02 0.00 - 0.05 10*3/mm3    nRBC 0.0 0.0 - 0.2 /100 WBC           The following portions of the patient's history were reviewed and updated as appropriate: allergies, current medications, past family history, past medical history, past social history, past  surgical history and problem list.    Review of Systems   Constitutional: Negative for activity change, chills and fever.   Respiratory: Negative for cough.    Cardiovascular: Negative for chest pain.   Psychiatric/Behavioral: Negative for dysphoric mood.       Objective   Physical Exam  Constitutional:       General: She is not in acute distress.     Appearance: She is well-developed.   Cardiovascular:      Rate and Rhythm: Normal rate and regular rhythm.   Pulmonary:      Effort: Pulmonary effort is normal.      Breath sounds: Normal breath sounds.   Neurological:      Mental Status: She is alert and oriented to person, place, and time.   Psychiatric:         Behavior: Behavior normal.         Thought Content: Thought content normal.     Labs reviewed with pt today during visit. All questions answered.      Assessment/Plan   Diagnoses and all orders for this visit:    1. Essential hypertension (Primary)  -     amLODIPine-benazepril (LOTREL 5-20) 5-20 MG per capsule; Take 1 capsule by mouth Daily.  Dispense: 90 capsule; Refill: 1    2. Primary osteoarthritis, unspecified site  -     celecoxib (CeleBREX) 200 MG capsule; Take 1 capsule by mouth Daily.  Dispense: 90 capsule; Refill: 1

## 2021-07-14 ENCOUNTER — OFFICE VISIT (OUTPATIENT)
Dept: FAMILY MEDICINE CLINIC | Facility: CLINIC | Age: 76
End: 2021-07-14

## 2021-07-14 VITALS
DIASTOLIC BLOOD PRESSURE: 64 MMHG | TEMPERATURE: 97.4 F | HEART RATE: 66 BPM | SYSTOLIC BLOOD PRESSURE: 128 MMHG | RESPIRATION RATE: 14 BRPM | HEIGHT: 63 IN | WEIGHT: 172 LBS | BODY MASS INDEX: 30.48 KG/M2

## 2021-07-14 DIAGNOSIS — I10 ESSENTIAL HYPERTENSION: Primary | Chronic | ICD-10-CM

## 2021-07-14 DIAGNOSIS — M19.91 PRIMARY OSTEOARTHRITIS, UNSPECIFIED SITE: Chronic | ICD-10-CM

## 2021-07-14 PROCEDURE — 99214 OFFICE O/P EST MOD 30 MIN: CPT | Performed by: FAMILY MEDICINE

## 2021-07-14 RX ORDER — AMLODIPINE BESYLATE AND BENAZEPRIL HYDROCHLORIDE 5; 20 MG/1; MG/1
1 CAPSULE ORAL DAILY
Qty: 90 CAPSULE | Refills: 1 | Status: SHIPPED | OUTPATIENT
Start: 2021-07-14 | End: 2021-12-21 | Stop reason: SDUPTHER

## 2021-07-14 RX ORDER — CELECOXIB 200 MG/1
200 CAPSULE ORAL DAILY
Qty: 90 CAPSULE | Refills: 1 | Status: SHIPPED | OUTPATIENT
Start: 2021-07-14 | End: 2021-12-21 | Stop reason: SDUPTHER

## 2021-12-16 ENCOUNTER — HOSPITAL ENCOUNTER (EMERGENCY)
Facility: HOSPITAL | Age: 76
Discharge: HOME | End: 2021-12-16
Attending: EMERGENCY MEDICINE | Admitting: EMERGENCY MEDICINE

## 2021-12-16 ENCOUNTER — APPOINTMENT (OUTPATIENT)
Dept: CARDIOLOGY | Facility: HOSPITAL | Age: 76
End: 2021-12-16

## 2021-12-16 VITALS
OXYGEN SATURATION: 96 % | HEIGHT: 63 IN | BODY MASS INDEX: 30.12 KG/M2 | WEIGHT: 170 LBS | TEMPERATURE: 99.3 F | DIASTOLIC BLOOD PRESSURE: 83 MMHG | RESPIRATION RATE: 18 BRPM | HEART RATE: 72 BPM | SYSTOLIC BLOOD PRESSURE: 143 MMHG

## 2021-12-16 DIAGNOSIS — M79.604 MUSCULOSKELETAL PAIN OF RIGHT LOWER EXTREMITY: Primary | ICD-10-CM

## 2021-12-16 LAB
BH CV LOWER VASCULAR LEFT COMMON FEMORAL AUGMENT: NORMAL
BH CV LOWER VASCULAR LEFT COMMON FEMORAL COMPETENT: NORMAL
BH CV LOWER VASCULAR LEFT COMMON FEMORAL COMPRESS: NORMAL
BH CV LOWER VASCULAR LEFT COMMON FEMORAL PHASIC: NORMAL
BH CV LOWER VASCULAR LEFT COMMON FEMORAL SPONT: NORMAL
BH CV LOWER VASCULAR RIGHT COMMON FEMORAL AUGMENT: NORMAL
BH CV LOWER VASCULAR RIGHT COMMON FEMORAL COMPETENT: NORMAL
BH CV LOWER VASCULAR RIGHT COMMON FEMORAL COMPRESS: NORMAL
BH CV LOWER VASCULAR RIGHT COMMON FEMORAL PHASIC: NORMAL
BH CV LOWER VASCULAR RIGHT COMMON FEMORAL SPONT: NORMAL
BH CV LOWER VASCULAR RIGHT DISTAL FEMORAL COMPRESS: NORMAL
BH CV LOWER VASCULAR RIGHT GASTRONEMIUS COMPRESS: NORMAL
BH CV LOWER VASCULAR RIGHT GREATER SAPH AK COMPRESS: NORMAL
BH CV LOWER VASCULAR RIGHT GREATER SAPH BK COMPRESS: NORMAL
BH CV LOWER VASCULAR RIGHT LESSER SAPH COMPRESS: NORMAL
BH CV LOWER VASCULAR RIGHT MID FEMORAL AUGMENT: NORMAL
BH CV LOWER VASCULAR RIGHT MID FEMORAL COMPETENT: NORMAL
BH CV LOWER VASCULAR RIGHT MID FEMORAL COMPRESS: NORMAL
BH CV LOWER VASCULAR RIGHT MID FEMORAL PHASIC: NORMAL
BH CV LOWER VASCULAR RIGHT MID FEMORAL SPONT: NORMAL
BH CV LOWER VASCULAR RIGHT PERONEAL COMPRESS: NORMAL
BH CV LOWER VASCULAR RIGHT POPLITEAL AUGMENT: NORMAL
BH CV LOWER VASCULAR RIGHT POPLITEAL COMPETENT: NORMAL
BH CV LOWER VASCULAR RIGHT POPLITEAL COMPRESS: NORMAL
BH CV LOWER VASCULAR RIGHT POPLITEAL PHASIC: NORMAL
BH CV LOWER VASCULAR RIGHT POPLITEAL SPONT: NORMAL
BH CV LOWER VASCULAR RIGHT POSTERIOR TIBIAL COMPRESS: NORMAL
BH CV LOWER VASCULAR RIGHT PROFUNDA FEMORAL COMPRESS: NORMAL
BH CV LOWER VASCULAR RIGHT PROXIMAL FEMORAL COMPRESS: NORMAL
BH CV LOWER VASCULAR RIGHT SAPHENOFEMORAL JUNCTION COMPRESS: NORMAL

## 2021-12-16 PROCEDURE — 93971 EXTREMITY STUDY: CPT

## 2021-12-16 PROCEDURE — 99282 EMERGENCY DEPT VISIT SF MDM: CPT

## 2021-12-16 RX ORDER — CYCLOBENZAPRINE HCL 10 MG
10 TABLET ORAL 3 TIMES DAILY PRN
Qty: 15 TABLET | Refills: 0 | Status: SHIPPED | OUTPATIENT
Start: 2021-12-16 | End: 2021-12-22

## 2021-12-16 NOTE — ED PROVIDER NOTES
MD ATTESTATION NOTE    The NADIA and I have discussed this patient's history, physical exam, and treatment plan.  I have reviewed the documentation and personally had a face to face interaction with the patient. I affirm the documentation and agree with the treatment and plan.  The attached note describes my personal findings.      Minnie Ross is a 76 y.o. female who presents to the ED c/o right leg pain. She states it occurred last week once. She states it happened again yesterday. She states she took a muscle relaxer with improvement. She called her doctor who directed her here for evaluation. She has no chest pain or shortness of breath. Nothing seems to make it worse or better. The pain is in her right leg just proximal and lateral to her right knee. She reports a history of a baker's cyst in that knee.       On exam:  GENERAL: Awake, alert, no acute distress  SKIN: Warm, dry  HENT: Normocephalic, atraumatic  EYES: no scleral icterus  CV: regular rhythm, regular rate  RESPIRATORY: normal effort, lungs clear  ABDOMEN: soft, nontender, nondistended  MUSCULOSKELETAL: no deformity. No right sided calf tenderness or swelling. No tenderness in the popliteal fossa. No discoloration.   NEURO: alert, moves all extremities, follows commands    Labs  Recent Results (from the past 24 hour(s))   Duplex Venous Lower Extremity Right    Collection Time: 12/16/21  1:20 PM   Result Value Ref Range    Right Common Femoral Spont Y     Right Common Femoral Phasic Y     Right Common Femoral Augment Y     Right Common Femoral Competent Y     Right Common Femoral Compress C     Right Saphenofemoral Junction Compress C     Right Profunda Femoral Compress C     Right Proximal Femoral Compress C     Right Mid Femoral Spont Y     Right Mid Femoral Phasic Y     Right Mid Femoral Augment Y     Right Mid Femoral Competent Y     Right Mid Femoral Compress C     Right Distal Femoral Compress C     Right Popliteal Spont Y     Right Popliteal  Phasic Y     Right Popliteal Augment Y     Right Popliteal Competent Y     Right Popliteal Compress C     Right Posterior Tibial Compress C     Right Peroneal Compress C     Right Gastronemius Compress C     Right Greater Saph AK Compress C     Right Greater Saph BK Compress C     Right Lesser Saph Compress C     Left Common Femoral Spont Y     Left Common Femoral Phasic Y     Left Common Femoral Augment Y     Left Common Femoral Competent Y     Left Common Femoral Compress C        Radiology  Duplex Venous Lower Extremity Right    Result Date: 12/16/2021  · Normal right lower extremity venous duplex scan.       Medical Decision Making:  ED Course as of 12/16/21 2007   Thu Dec 16, 2021   1322 Discussed with vascular tech, patient had of right lower extremity venous Doppler. [MS]   1325 Patient updated on normal venous Doppler of her right lower extremity.  Discussed with patient that this sounds musculoskeletal in nature, she should continue her exercising and activity as tolerated.  We will give her short course of muscle relaxers since that has helped her pain in the past.  She was given strict return to ER precautions and close follow-up with her PMD.  Patient verbalized understanding and is agreeable to this plan. [MS]   1326 Reviewed pt's history and workup with Dr. Porter.  After a bedside evaluation, they agree with the plan of care.       [MS]      ED Course User Index  [MS] Makenzie Negro, GABRIELE       Plan ultrasound to rule out DVT. She is currently pain free. Her pain sounds very musculoskeletal in origin. If her ultrasound is negative, I do not feel she needs any further diagnostics.    PPE: The patient wore a mask and I wore an N95 mask throughout the entire patient encounter.     The patient has started, but not completed, their COVID-19 vaccination series.    Diagnosis  Final diagnoses:   Musculoskeletal pain of right lower extremity        Jacob Porter MD  12/16/21 2007

## 2021-12-16 NOTE — ED PROVIDER NOTES
EMERGENCY DEPARTMENT ENCOUNTER    Room Number:  B02/02  Date of encounter:  12/16/2021  PCP: Andriy Nielson MD  Historian: Patient      PPE    Patient was placed in face mask in first look. Patient was wearing facemask when I entered the room and throughout our encounter. I wore full protective equipment throughout this patient encounter including a N95 face mask, and gloves. Hand hygiene was performed before donning protective equipment and after removal when leaving the room.        HPI:  Chief Complaint: Right leg pain  A complete HPI/ROS/PMH/PSH/SH/FH are unobtainable due to: Nothing    Context: Minnie Ross is a 76 y.o. female who arrives to the ED via private vehicle.  Patient presents with c/o mild, intermittent, achy pain to her right upper leg that occurred yesterday and is currently resolved.  Patient states she had similar symptoms a week ago, felt like she had pulled her hamstring.  She states that she has been riding a sedentary bike.  She states she took a muscle relaxers and it did help the pain.  She states last night she was doing something and thinks she turned around and felt the twinge of pain again.  Patient denies chest pain, shortness of breath, fever, chills, numbness or tingling in her leg.  Patient states that she is always had some swelling in her right leg since having her knee replaced.  Patient states that nothing makes the symptoms better and nothing worsens symptoms.  Patient has no previous history of DVTs or PEs.        PAST MEDICAL HISTORY  Active Ambulatory Problems     Diagnosis Date Noted   • Hypertension 09/21/2016   • Eczema 09/21/2016   • Primary osteoarthritis 05/07/2019   • Chest pain 09/01/2019   • Dyspnea on exertion 09/01/2019   • Diaphoresis 09/01/2019   • Pulmonary nodule 09/05/2019   • Bruising 08/20/2020     Resolved Ambulatory Problems     Diagnosis Date Noted   • No Resolved Ambulatory Problems     Past Medical History:   Diagnosis Date   • Abnormal Pap smear  of cervix    • Arthritis    • Cataract    • H/O complete eye exam 2018   • History of blood transfusion    • History of bone density study 2018   • IBS (irritable bowel syndrome)          PAST SURGICAL HISTORY  Past Surgical History:   Procedure Laterality Date   • APPENDECTOMY  1960   • CATARACT EXTRACTION     • CHOLECYSTECTOMY  1975   • KNEE SURGERY Right     Homero Gaston MD   • MAMMO BILATERAL  2018   • MOUTH SURGERY  2013   • PAP SMEAR  2016   • PARTIAL KNEE ARTHROPLASTY  2010         FAMILY HISTORY  Family History   Problem Relation Age of Onset   • Hypertension Mother    • Heart disease Mother    • Arthritis Mother    • Deep vein thrombosis Brother    • Hyperlipidemia Sister          SOCIAL HISTORY  Social History     Socioeconomic History   • Marital status:      Spouse name: Julián   • Number of children: 1   Tobacco Use   • Smoking status: Former Smoker     Types: Cigarettes     Quit date:      Years since quittin.9   • Smokeless tobacco: Never Used   Substance and Sexual Activity   • Alcohol use: Yes     Alcohol/week: 14.0 standard drinks     Types: 14 Glasses of wine per week     Comment: occasionally   • Drug use: No   • Sexual activity: Never     Partners: Male     Birth control/protection: Post-menopausal         ALLERGIES  Patient has no known allergies.        REVIEW OF SYSTEMS  Review of Systems     All systems reviewed and negative except for those discussed in HPI.        PHYSICAL EXAM    ED Triage Vitals   Temp Heart Rate Resp BP SpO2   21 1045 21 1045 21 1045 21 1334 21 1045   99.3 °F (37.4 °C) 89 16 143/83 96 %         Physical Exam  GENERAL: Well appearing, nontoxic appearing, not distressed  HENT: normocephalic, atraumatic  EYES: no scleral icterus, PERRL  CV: regular rhythm, regular rate, no murmur  RESPIRATORY: normal effort, CTAB  ABDOMEN: soft   MUSCULOSKELETAL: no deformity  Patient has no reproducible tenderness to her  right leg, there is a mild swelling compared to the left however patient states this is normal.  Negative Homans' sign.  2+ DP and PT pulses.  There is no erythema, warmth noted to the right leg.  NEURO: alert, moves all extremities, follows commands, mental status normal/baseline  SKIN: warm, dry, no rash   Psych: Appropriate mood and affect  Nursing notes and vital signs reviewed      LAB RESULTS  Recent Results (from the past 24 hour(s))   Duplex Venous Lower Extremity Right    Collection Time: 12/16/21  1:20 PM   Result Value Ref Range    Right Common Femoral Spont Y     Right Common Femoral Phasic Y     Right Common Femoral Augment Y     Right Common Femoral Competent Y     Right Common Femoral Compress C     Right Saphenofemoral Junction Compress C     Right Profunda Femoral Compress C     Right Proximal Femoral Compress C     Right Mid Femoral Spont Y     Right Mid Femoral Phasic Y     Right Mid Femoral Augment Y     Right Mid Femoral Competent Y     Right Mid Femoral Compress C     Right Distal Femoral Compress C     Right Popliteal Spont Y     Right Popliteal Phasic Y     Right Popliteal Augment Y     Right Popliteal Competent Y     Right Popliteal Compress C     Right Posterior Tibial Compress C     Right Peroneal Compress C     Right Gastronemius Compress C     Right Greater Saph AK Compress C     Right Greater Saph BK Compress C     Right Lesser Saph Compress C     Left Common Femoral Spont Y     Left Common Femoral Phasic Y     Left Common Femoral Augment Y     Left Common Femoral Competent Y     Left Common Femoral Compress C        Ordered the above labs and independently reviewed the results.      RADIOLOGY  Duplex Venous Lower Extremity Right    Result Date: 12/16/2021  · Normal right lower extremity venous duplex scan.        I ordered the above noted radiological studies and viewed the images on the PACS system.         MEDICAL RECORD REVIEW  Medical records reviewed in [facility], patient had a stress  test done in 2019 that showed no evidence of ischemia.      PROCEDURES    Procedures        DIFFERENTIAL DIAGNOSIS  Differential diagnosis include but are not limited to the following: DVT, musculoskeletal pain, Baker's cyst      PROGRESS, DATA ANALYSIS, CONSULTS, AND MEDICAL DECISION MAKING        ED Course as of 12/16/21 1713   Thu Dec 16, 2021   1322 Discussed with vascular tech, patient had of right lower extremity venous Doppler. [MS]   1325 Patient updated on normal venous Doppler of her right lower extremity.  Discussed with patient that this sounds musculoskeletal in nature, she should continue her exercising and activity as tolerated.  We will give her short course of muscle relaxers since that has helped her pain in the past.  She was given strict return to ER precautions and close follow-up with her PMD.  Patient verbalized understanding and is agreeable to this plan. [MS]   1326 Reviewed pt's history and workup with Dr. Porter.  After a bedside evaluation, they agree with the plan of care.       [MS]      ED Course User Index  [MS] Makenzie Negro APRN     Discussed plan for discharge, as there is no emergent indication for admission. Pt/family is agreeable and understands need for follow up and repeat testing.  Pt is aware that discharge does not mean that nothing is wrong but it indicates no emergency is present that requires admission and they must continue care with follow-up as given below or physician of their choice.   Patient/Family voiced understanding of above instructions.  Patient discharged in stable condition.    DIAGNOSIS  Final diagnoses:   Musculoskeletal pain of right lower extremity       FOLLOW UP   Andriy Nielson MD  [id number] [id number]  [phone]    Schedule an appointment as soon as possible for a visit   If symptoms worsen      RX     Medication List      Changed    * cyclobenzaprine 10 MG tablet  Commonly known as: FLEXERIL  Take 1  tablet by mouth 2 (Two) Times a Day As Needed for Muscle Spasms.  What changed: Another medication with the same name was added. Make sure you understand how and when to take each.     * cyclobenzaprine 10 MG tablet  Commonly known as: FLEXERIL  Take 1 tablet by mouth 3 (Three) Times a Day As Needed for Muscle Spasms.  What changed: You were already taking a medication with the same name, and this prescription was added. Make sure you understand how and when to take each.         * This list has 2 medication(s) that are the same as other medications prescribed for you. Read the directions carefully, and ask your doctor or other care provider to review them with you.               Where to Get Your Medications      These medications were sent to [facility] [address] - [address] [facility] AT [facility] & [address] - [phone]  - [phone]   [address] [facility] [address]    Phone: [phone]   · cyclobenzaprine 10 MG tablet           MEDICATIONS GIVEN IN ED    Medications - No data to display        COURSE & MEDICAL DECISION MAKING  Any/All labs and Any/All Imaging studies that were ordered were reviewed and are noted above.  Results were reviewed/discussed with the patient and they were also made aware of online access.    Pt also made aware that some labs, such as cultures, will not be resulted during ER visit and followup with PMD is necessary.        Makenzie Negro, APRN  12/16/21 [id number]

## 2021-12-16 NOTE — DISCHARGE INSTRUCTIONS
Medications as ordered  Activity as tolerated  Warm compresses to leg as needed  Follow-up with PMD in 3 to 5 days if symptoms not improving  Return to the ER for fever, chills, chest pain, shortness of breath, leg swelling, redness or warmth to your leg or any new or worsening symptoms

## 2021-12-22 ENCOUNTER — OFFICE VISIT (OUTPATIENT)
Dept: FAMILY MEDICINE CLINIC | Facility: CLINIC | Age: 76
End: 2021-12-22

## 2021-12-22 VITALS
SYSTOLIC BLOOD PRESSURE: 117 MMHG | BODY MASS INDEX: 31.01 KG/M2 | HEART RATE: 84 BPM | TEMPERATURE: 96.7 F | WEIGHT: 175 LBS | HEIGHT: 63 IN | RESPIRATION RATE: 16 BRPM | DIASTOLIC BLOOD PRESSURE: 71 MMHG

## 2021-12-22 DIAGNOSIS — I10 ESSENTIAL HYPERTENSION: Chronic | ICD-10-CM

## 2021-12-22 DIAGNOSIS — Z09 HOSPITAL DISCHARGE FOLLOW-UP: ICD-10-CM

## 2021-12-22 DIAGNOSIS — M19.91 PRIMARY OSTEOARTHRITIS, UNSPECIFIED SITE: Chronic | ICD-10-CM

## 2021-12-22 DIAGNOSIS — M79.604 MUSCULOSKELETAL PAIN OF RIGHT LOWER EXTREMITY: Primary | ICD-10-CM

## 2021-12-22 PROCEDURE — 99214 OFFICE O/P EST MOD 30 MIN: CPT | Performed by: FAMILY MEDICINE

## 2021-12-22 RX ORDER — AMLODIPINE BESYLATE AND BENAZEPRIL HYDROCHLORIDE 5; 20 MG/1; MG/1
1 CAPSULE ORAL DAILY
Qty: 90 CAPSULE | Refills: 1 | Status: SHIPPED | OUTPATIENT
Start: 2021-12-22 | End: 2022-05-04 | Stop reason: SDUPTHER

## 2021-12-22 RX ORDER — CELECOXIB 200 MG/1
200 CAPSULE ORAL DAILY
Qty: 90 CAPSULE | Refills: 1 | Status: SHIPPED | OUTPATIENT
Start: 2021-12-22 | End: 2022-07-14 | Stop reason: SDUPTHER

## 2021-12-22 NOTE — PROGRESS NOTES
Subjective   Minnie Ross is a 76 y.o. female.     History of Present Illness     Chief Complaint:   Chief Complaint   Patient presents with   • right knee pain     follow up urgent care 12/16/2021   / gloria    • Hypertension   • Osteoarthritis       Minnie Ross 76 y.o. female who presents today for Medical Management of the below listed issues and medication refills. She  has a problem list of   Patient Active Problem List   Diagnosis   • Hypertension   • Eczema   • Primary osteoarthritis   • Chest pain   • Dyspnea on exertion   • Diaphoresis   • Pulmonary nodule   • Bruising   .  Since the last visit, She has overall felt well until a trip to the [facility] ED on 12/16/21 for leg pain. That visit was as follows:    Context: Minnie Ross is a 76 y.o. female who arrives to the ED via private vehicle.  Patient presents with c/o mild, intermittent, achy pain to her right upper leg that occurred yesterday and is currently resolved.  Patient states she had similar symptoms a week ago, felt like she had pulled her hamstring.  She states that she has been riding a sedentary bike.  She states she took a muscle relaxers and it did help the pain.  She states last night she was doing something and thinks she turned around and felt the twinge of pain again.  Patient denies chest pain, shortness of breath, fever, chills, numbness or tingling in her leg.  Patient states that she is always had some swelling in her right leg since having her knee replaced.  Patient states that nothing makes the symptoms better and nothing worsens symptoms.  Patient has no previous history of DVTs or PEs.    Duplex Venous Lower Extremity Right   Result Date: 12/16/2021   · Normal right lower extremity venous duplex scan    Patient updated on normal venous Doppler of her right lower extremity. Discussed with patient that this sounds musculoskeletal in nature, she should continue her exercising and activity as tolerated. We will give her short  "course of muscle relaxers since that has helped her pain in the past. She was given strict return to ER precautions and close follow-up with her PMD. Patient verbalized understanding and is agreeable to this plan. [MS]    DIAGNOSIS   Final diagnoses:   Musculoskeletal pain of right lower extremity     Current outpatient and discharge medications have been reconciled for the patient.  Reviewed by: Andriy Nielson MD    Pt reports the leg pain is off/on currently. Pt reports muscle relaxers she had left over seems to help.     she has been compliant with   Current Outpatient Medications:   •  amLODIPine-benazepril (LOTREL 5-20) 5-20 MG per capsule, Take 1 capsule by mouth Daily., Disp: 90 capsule, Rfl: 1  •  BIOTIN PO, Take  by mouth., Disp: , Rfl:   •  celecoxib (CeleBREX) 200 MG capsule, Take 1 capsule by mouth Daily., Disp: 90 capsule, Rfl: 1  •  cyclobenzaprine (FLEXERIL) 10 MG tablet, Take 1 tablet by mouth 2 (Two) Times a Day As Needed for Muscle Spasms., Disp: 20 tablet, Rfl: 2  •  Multiple Vitamins-Minerals (ICAPS AREDS 2 PO), Take  by mouth., Disp: , Rfl:   •  MULTIPLE VITAMINS-MINERALS ER PO, Take  by mouth., Disp: , Rfl:   •  TURMERIC PO, Take  by mouth., Disp: , Rfl: .  She denies medication side effects.    All of the other chronic condition(s) listed above are stable w/o issues.    /71   Pulse 84   Temp 96.7 °F (35.9 °C) (Oral)   Resp 16   Ht 160 cm (63\")   Wt 79.4 kg (175 lb)   LMP  (LMP Unknown)   BMI 31.00 kg/m²     Results for orders placed or performed during the hospital encounter of 12/16/21   Duplex Venous Lower Extremity Right   Result Value Ref Range    Right Common Femoral Spont Y     Right Common Femoral Phasic Y     Right Common Femoral Augment Y     Right Common Femoral Competent Y     Right Common Femoral Compress C     Right Saphenofemoral Junction Compress C     Right Profunda Femoral Compress C     Right Proximal Femoral Compress C     Right Mid Femoral Spont Y     Right Mid " Femoral Phasic Y     Right Mid Femoral Augment Y     Right Mid Femoral Competent Y     Right Mid Femoral Compress C     Right Distal Femoral Compress C     Right Popliteal Spont Y     Right Popliteal Phasic Y     Right Popliteal Augment Y     Right Popliteal Competent Y     Right Popliteal Compress C     Right Posterior Tibial Compress C     Right Peroneal Compress C     Right Gastronemius Compress C     Right Greater Saph AK Compress C     Right Greater Saph BK Compress C     Right Lesser Saph Compress C     Left Common Femoral Spont Y     Left Common Femoral Phasic Y     Left Common Femoral Augment Y     Left Common Femoral Competent Y     Left Common Femoral Compress C              The following portions of the patient's history were reviewed and updated as appropriate: allergies, current medications, past family history, past medical history, past social history, past surgical history, and problem list.    Review of Systems   Constitutional: Negative for activity change, chills and fever.   Respiratory: Negative for cough.    Cardiovascular: Negative for chest pain.   Psychiatric/Behavioral: Negative for dysphoric mood.       Objective   Physical Exam  Constitutional:       General: She is not in acute distress.     Appearance: She is well-developed.   Pulmonary:      Effort: Pulmonary effort is normal.   Neurological:      Mental Status: She is alert and oriented to person, place, and time.   Psychiatric:         Behavior: Behavior normal.         Thought Content: Thought content normal.     Hospital records reviewed with pt confirming HPI.        Assessment/Plan   Diagnoses and all orders for this visit:    1. Musculoskeletal pain of right lower extremity (Primary)    2. Hospital discharge follow-up    3. Essential hypertension  -     amLODIPine-benazepril (LOTREL 5-20) 5-20 MG per capsule; Take 1 capsule by mouth Daily.  Dispense: 90 capsule; Refill: 1    4. Primary osteoarthritis, unspecified site  -      celecoxib (CeleBREX) 200 MG capsule; Take 1 capsule by mouth Daily.  Dispense: 90 capsule; Refill: 1

## 2022-01-11 ENCOUNTER — HOSPITAL ENCOUNTER (OUTPATIENT)
Dept: CT IMAGING | Facility: HOSPITAL | Age: 77
Discharge: HOME | End: 2022-01-11
Admitting: INTERNAL MEDICINE

## 2022-01-11 DIAGNOSIS — R91.8 LUNG NODULES: ICD-10-CM

## 2022-01-11 PROCEDURE — 71250 CT THORAX DX C-: CPT

## 2022-01-18 ENCOUNTER — TRANSCRIBE ORDERS (OUTPATIENT)
Dept: ADMINISTRATIVE | Facility: HOSPITAL | Age: 77
End: 2022-01-18

## 2022-01-18 DIAGNOSIS — R91.8 PULMONARY NODULES: Primary | ICD-10-CM

## 2022-02-09 ENCOUNTER — APPOINTMENT (OUTPATIENT)
Dept: WOMENS IMAGING | Facility: HOSPITAL | Age: 77
End: 2022-02-09

## 2022-02-09 PROCEDURE — 77067 SCR MAMMO BI INCL CAD: CPT | Performed by: RADIOLOGY

## 2022-02-09 PROCEDURE — 77063 BREAST TOMOSYNTHESIS BI: CPT | Performed by: RADIOLOGY

## 2022-05-04 DIAGNOSIS — I10 ESSENTIAL HYPERTENSION: Chronic | ICD-10-CM

## 2022-05-04 RX ORDER — AMLODIPINE BESYLATE AND BENAZEPRIL HYDROCHLORIDE 5; 20 MG/1; MG/1
1 CAPSULE ORAL DAILY
Qty: 90 CAPSULE | Refills: 1 | Status: SHIPPED | OUTPATIENT
Start: 2022-05-04 | End: 2022-07-14 | Stop reason: SDUPTHER

## 2022-07-17 NOTE — PROGRESS NOTES
"Subjective   Minnie Ross is a 76 y.o. female.     History of Present Illness     Chief Complaint:   Chief Complaint   Patient presents with   • Hypertension   • Arthritis     MED REFILL / NO LABS    • Joint Swelling     Pain and swelling multiple areas        Minnie Ross 76 y.o. female who presents today for Medical Management of the below listed issues. She  has a problem list of   Patient Active Problem List   Diagnosis   • Hypertension   • Eczema   • Primary osteoarthritis   • Chest pain   • Dyspnea on exertion   • Diaphoresis   • Pulmonary nodule   • Bruising   .  Since the last visit, She has overall felt well, although reports some mild ankle edema and LÓPEZ the past 2 weeks w/o CP. Nurse noted her HR when rooming her today.   she has been compliant with   Current Outpatient Medications:   •  amLODIPine-benazepril (LOTREL 5-20) 5-20 MG per capsule, Take 1 capsule by mouth Daily., Disp: 90 capsule, Rfl: 1  •  celecoxib (CeleBREX) 200 MG capsule, Take 1 capsule by mouth Daily., Disp: 90 capsule, Rfl: 1  •  BIOTIN PO, Take  by mouth., Disp: , Rfl:   •  Multiple Vitamins-Minerals (ICAPS AREDS 2 PO), Take  by mouth., Disp: , Rfl:   •  TURMERIC PO, Take  by mouth., Disp: , Rfl: .  She denies medication side effects.    All of the other chronic condition(s) listed above are stable w/o issues.    /82   Pulse (!) 140   Temp 97.9 °F (36.6 °C) (Oral)   Resp 16   Ht 160 cm (63\")   Wt 80.7 kg (178 lb)   LMP  (LMP Unknown)   SpO2 98%   BMI 31.53 kg/m²     Results for orders placed or performed during the hospital encounter of 12/16/21   Duplex Venous Lower Extremity Right   Result Value Ref Range    Right Common Femoral Spont Y     Right Common Femoral Phasic Y     Right Common Femoral Augment Y     Right Common Femoral Competent Y     Right Common Femoral Compress C     Right Saphenofemoral Junction Compress C     Right Profunda Femoral Compress C     Right Proximal Femoral Compress C     Right Mid " Femoral Spont Y     Right Mid Femoral Phasic Y     Right Mid Femoral Augment Y     Right Mid Femoral Competent Y     Right Mid Femoral Compress C     Right Distal Femoral Compress C     Right Popliteal Spont Y     Right Popliteal Phasic Y     Right Popliteal Augment Y     Right Popliteal Competent Y     Right Popliteal Compress C     Right Posterior Tibial Compress C     Right Peroneal Compress C     Right Gastronemius Compress C     Right Greater Saph AK Compress C     Right Greater Saph BK Compress C     Right Lesser Saph Compress C     Left Common Femoral Spont Y     Left Common Femoral Phasic Y     Left Common Femoral Augment Y     Left Common Femoral Competent Y     Left Common Femoral Compress C              The following portions of the patient's history were reviewed and updated as appropriate: allergies, current medications, past family history, past medical history, past social history, past surgical history, and problem list.    Review of Systems   Constitutional: Negative for activity change, chills and fever.   Respiratory: Negative for cough.    Cardiovascular: Negative for chest pain.   Psychiatric/Behavioral: Negative for dysphoric mood.       Objective   Physical Exam  Constitutional:       General: She is not in acute distress.     Appearance: She is well-developed.   Cardiovascular:      Rate and Rhythm: Regular rhythm. Tachycardia present.   Pulmonary:      Effort: Pulmonary effort is normal.      Breath sounds: Normal breath sounds.   Neurological:      Mental Status: She is alert and oriented to person, place, and time.   Psychiatric:         Behavior: Behavior normal.         Thought Content: Thought content normal.             Diagnoses and all orders for this visit:    1. Atrial flutter, unspecified type (HCC) (Primary)  Comments:  New, addressed immediately  Orders:  -     ECG 12 Lead    2. Essential hypertension  -     amLODIPine-benazepril (LOTREL 5-20) 5-20 MG per capsule; Take 1 capsule  by mouth Daily.  Dispense: 90 capsule; Refill: 1  -     Comprehensive metabolic panel  -     Lipid panel  -     CBC and Differential  -     TSH    3. Primary osteoarthritis, unspecified site  -     celecoxib (CeleBREX) 200 MG capsule; Take 1 capsule by mouth Daily.  Dispense: 90 capsule; Refill: 1    4. Postmenopausal  -     DEXA Bone Density Axial; Future    Called and got pt [facility]. Pt symptom-free currently and chooses to go there immediately herself (vs ambulance).

## 2022-07-18 ENCOUNTER — HOSPITAL ENCOUNTER (OUTPATIENT)
Facility: HOSPITAL | Age: 77
Discharge: HOME | End: 2022-07-19
Attending: INTERNAL MEDICINE | Admitting: INTERNAL MEDICINE

## 2022-07-18 ENCOUNTER — HOSPITAL ENCOUNTER (OUTPATIENT)
Dept: CT IMAGING | Facility: HOSPITAL | Age: 77
Discharge: HOME | End: 2022-07-18

## 2022-07-18 ENCOUNTER — OFFICE VISIT (OUTPATIENT)
Dept: FAMILY MEDICINE CLINIC | Facility: CLINIC | Age: 77
End: 2022-07-18

## 2022-07-18 ENCOUNTER — HOSPITAL ENCOUNTER (OUTPATIENT)
Dept: CARDIOLOGY | Facility: HOSPITAL | Age: 77
Discharge: HOME | End: 2022-07-18

## 2022-07-18 VITALS
WEIGHT: 178 LBS | OXYGEN SATURATION: 98 % | DIASTOLIC BLOOD PRESSURE: 82 MMHG | RESPIRATION RATE: 16 BRPM | HEIGHT: 63 IN | TEMPERATURE: 97.9 F | HEART RATE: 140 BPM | BODY MASS INDEX: 31.54 KG/M2 | SYSTOLIC BLOOD PRESSURE: 124 MMHG

## 2022-07-18 VITALS
DIASTOLIC BLOOD PRESSURE: 88 MMHG | HEIGHT: 63 IN | OXYGEN SATURATION: 97 % | WEIGHT: 178 LBS | BODY MASS INDEX: 31.54 KG/M2 | HEART RATE: 137 BPM | RESPIRATION RATE: 16 BRPM | SYSTOLIC BLOOD PRESSURE: 132 MMHG

## 2022-07-18 DIAGNOSIS — M19.91 PRIMARY OSTEOARTHRITIS, UNSPECIFIED SITE: Chronic | ICD-10-CM

## 2022-07-18 DIAGNOSIS — R06.02 SHORTNESS OF BREATH: ICD-10-CM

## 2022-07-18 DIAGNOSIS — R07.2 PRECORDIAL PAIN: ICD-10-CM

## 2022-07-18 DIAGNOSIS — I48.92 ATRIAL FLUTTER, UNSPECIFIED TYPE: Primary | ICD-10-CM

## 2022-07-18 DIAGNOSIS — Z78.0 POSTMENOPAUSAL: ICD-10-CM

## 2022-07-18 DIAGNOSIS — I10 ESSENTIAL HYPERTENSION: Chronic | ICD-10-CM

## 2022-07-18 DIAGNOSIS — R00.2 PALPITATIONS: ICD-10-CM

## 2022-07-18 DIAGNOSIS — I10 PRIMARY HYPERTENSION: ICD-10-CM

## 2022-07-18 DIAGNOSIS — R00.0 RAPID HEART RATE: Primary | ICD-10-CM

## 2022-07-18 DIAGNOSIS — R06.09 DYSPNEA ON EXERTION: ICD-10-CM

## 2022-07-18 DIAGNOSIS — R91.8 PULMONARY NODULES: ICD-10-CM

## 2022-07-18 LAB
ALBUMIN SERPL-MCNC: 4.5 G/DL (ref 3.5–5.2)
ALBUMIN/GLOB SERPL: 1.7 G/DL
ALP SERPL-CCNC: 126 U/L (ref 39–117)
ALT SERPL W P-5'-P-CCNC: 22 U/L (ref 1–33)
ANION GAP SERPL CALCULATED.3IONS-SCNC: 12.7 MMOL/L (ref 5–15)
AST SERPL-CCNC: 21 U/L (ref 1–32)
BASOPHILS # BLD AUTO: 0.05 10*3/MM3 (ref 0–0.2)
BASOPHILS NFR BLD AUTO: 0.6 % (ref 0–1.5)
BILIRUB SERPL-MCNC: 0.5 MG/DL (ref 0–1.2)
BUN SERPL-MCNC: 17 MG/DL (ref 8–23)
BUN/CREAT SERPL: 22.4 (ref 7–25)
CALCIUM SPEC-SCNC: 9.6 MG/DL (ref 8.6–10.5)
CHLORIDE SERPL-SCNC: 104 MMOL/L (ref 98–107)
CO2 SERPL-SCNC: 23.3 MMOL/L (ref 22–29)
CREAT SERPL-MCNC: 0.76 MG/DL (ref 0.57–1)
D DIMER PPP FEU-MCNC: 0.33 MCGFEU/ML (ref 0–0.49)
DEPRECATED RDW RBC AUTO: 40.3 FL (ref 37–54)
EGFRCR SERPLBLD CKD-EPI 2021: 82.3 ML/MIN/1.73
EOSINOPHIL # BLD AUTO: 0.1 10*3/MM3 (ref 0–0.4)
EOSINOPHIL NFR BLD AUTO: 1.3 % (ref 0.3–6.2)
ERYTHROCYTE [DISTWIDTH] IN BLOOD BY AUTOMATED COUNT: 12.3 % (ref 12.3–15.4)
GLOBULIN UR ELPH-MCNC: 2.7 GM/DL
GLUCOSE SERPL-MCNC: 105 MG/DL (ref 65–99)
HCT VFR BLD AUTO: 43.9 % (ref 34–46.6)
HGB BLD-MCNC: 15 G/DL (ref 12–15.9)
IMM GRANULOCYTES # BLD AUTO: 0.06 10*3/MM3 (ref 0–0.05)
IMM GRANULOCYTES NFR BLD AUTO: 0.8 % (ref 0–0.5)
LYMPHOCYTES # BLD AUTO: 3 10*3/MM3 (ref 0.7–3.1)
LYMPHOCYTES NFR BLD AUTO: 38.2 % (ref 19.6–45.3)
MCH RBC QN AUTO: 31.2 PG (ref 26.6–33)
MCHC RBC AUTO-ENTMCNC: 34.2 G/DL (ref 31.5–35.7)
MCV RBC AUTO: 91.3 FL (ref 79–97)
MONOCYTES # BLD AUTO: 0.53 10*3/MM3 (ref 0.1–0.9)
MONOCYTES NFR BLD AUTO: 6.7 % (ref 5–12)
NEUTROPHILS NFR BLD AUTO: 4.12 10*3/MM3 (ref 1.7–7)
NEUTROPHILS NFR BLD AUTO: 52.4 % (ref 42.7–76)
NRBC BLD AUTO-RTO: 0.1 /100 WBC (ref 0–0.2)
NT-PROBNP SERPL-MCNC: 519 PG/ML (ref 0–900)
PLATELET # BLD AUTO: 349 10*3/MM3 (ref 140–450)
PMV BLD AUTO: 8.8 FL (ref 6–12)
POTASSIUM SERPL-SCNC: 4.1 MMOL/L (ref 3.5–5.2)
PROT SERPL-MCNC: 7.2 G/DL (ref 6–8.5)
RBC # BLD AUTO: 4.81 10*6/MM3 (ref 3.77–5.28)
SODIUM SERPL-SCNC: 140 MMOL/L (ref 136–145)
T-UPTAKE NFR SERPL: 1.02 TBI (ref 0.8–1.3)
T4 SERPL-MCNC: 6.95 MCG/DL (ref 4.5–11.7)
TROPONIN T SERPL-MCNC: <0.01 NG/ML (ref 0–0.03)
TSH SERPL DL<=0.05 MIU/L-ACNC: 1.32 UIU/ML (ref 0.27–4.2)
WBC NRBC COR # BLD: 7.86 10*3/MM3 (ref 3.4–10.8)

## 2022-07-18 PROCEDURE — 99215 OFFICE O/P EST HI 40 MIN: CPT | Performed by: INTERNAL MEDICINE

## 2022-07-18 PROCEDURE — 85379 FIBRIN DEGRADATION QUANT: CPT | Performed by: INTERNAL MEDICINE

## 2022-07-18 PROCEDURE — 84436 ASSAY OF TOTAL THYROXINE: CPT

## 2022-07-18 PROCEDURE — 84484 ASSAY OF TROPONIN QUANT: CPT | Performed by: INTERNAL MEDICINE

## 2022-07-18 PROCEDURE — 84443 ASSAY THYROID STIM HORMONE: CPT

## 2022-07-18 PROCEDURE — 71250 CT THORAX DX C-: CPT

## 2022-07-18 PROCEDURE — 84479 ASSAY OF THYROID (T3 OR T4): CPT

## 2022-07-18 PROCEDURE — 83880 ASSAY OF NATRIURETIC PEPTIDE: CPT | Performed by: INTERNAL MEDICINE

## 2022-07-18 PROCEDURE — 99214 OFFICE O/P EST MOD 30 MIN: CPT | Performed by: FAMILY MEDICINE

## 2022-07-18 PROCEDURE — 85025 COMPLETE CBC W/AUTO DIFF WBC: CPT

## 2022-07-18 PROCEDURE — G0378 HOSPITAL OBSERVATION PER HR: HCPCS

## 2022-07-18 PROCEDURE — 96366 THER/PROPH/DIAG IV INF ADDON: CPT

## 2022-07-18 PROCEDURE — 94760 N-INVAS EAR/PLS OXIMETRY 1: CPT

## 2022-07-18 PROCEDURE — 96365 THER/PROPH/DIAG IV INF INIT: CPT

## 2022-07-18 PROCEDURE — 36415 COLL VENOUS BLD VENIPUNCTURE: CPT

## 2022-07-18 PROCEDURE — 80053 COMPREHEN METABOLIC PANEL: CPT

## 2022-07-18 PROCEDURE — 93000 ELECTROCARDIOGRAM COMPLETE: CPT | Performed by: FAMILY MEDICINE

## 2022-07-18 RX ORDER — METOPROLOL TARTRATE 5 MG/5ML
5 INJECTION INTRAVENOUS ONCE
Status: COMPLETED | OUTPATIENT
Start: 2022-07-18 | End: 2022-07-18

## 2022-07-18 RX ORDER — ENOXAPARIN SODIUM 100 MG/ML
1 INJECTION SUBCUTANEOUS EVERY 12 HOURS
Status: DISCONTINUED | OUTPATIENT
Start: 2022-07-18 | End: 2022-07-19 | Stop reason: HOSPADM

## 2022-07-18 RX ORDER — CELECOXIB 200 MG/1
200 CAPSULE ORAL DAILY
Qty: 90 CAPSULE | Refills: 1 | Status: SHIPPED | OUTPATIENT
Start: 2022-07-18 | End: 2022-07-19 | Stop reason: HOSPADM

## 2022-07-18 RX ORDER — DILTIAZEM HYDROCHLORIDE 180 MG/1
180 CAPSULE, COATED, EXTENDED RELEASE ORAL
Status: DISCONTINUED | OUTPATIENT
Start: 2022-07-18 | End: 2022-07-19 | Stop reason: HOSPADM

## 2022-07-18 RX ORDER — AMLODIPINE BESYLATE AND BENAZEPRIL HYDROCHLORIDE 5; 20 MG/1; MG/1
1 CAPSULE ORAL DAILY
Qty: 90 CAPSULE | Refills: 1 | Status: SHIPPED | OUTPATIENT
Start: 2022-07-18 | End: 2022-07-19 | Stop reason: HOSPADM

## 2022-07-18 RX ORDER — NITROGLYCERIN 0.4 MG/1
0.4 TABLET SUBLINGUAL
Status: DISCONTINUED | OUTPATIENT
Start: 2022-07-18 | End: 2022-07-19 | Stop reason: HOSPADM

## 2022-07-18 RX ORDER — DILTIAZEM HCL IN NACL,ISO-OSM 125 MG/125
10 PLASTIC BAG, INJECTION (ML) INTRAVENOUS CONTINUOUS
Status: DISCONTINUED | OUTPATIENT
Start: 2022-07-18 | End: 2022-07-19 | Stop reason: HOSPADM

## 2022-07-18 RX ORDER — SODIUM CHLORIDE 0.9 % (FLUSH) 0.9 %
10 SYRINGE (ML) INJECTION AS NEEDED
Status: DISCONTINUED | OUTPATIENT
Start: 2022-07-18 | End: 2022-07-19 | Stop reason: HOSPADM

## 2022-07-18 RX ORDER — DILTIAZEM HCL IN NACL,ISO-OSM 125 MG/125
5-15 PLASTIC BAG, INJECTION (ML) INTRAVENOUS
Status: DISCONTINUED | OUTPATIENT
Start: 2022-07-18 | End: 2022-07-18

## 2022-07-18 RX ADMIN — METOPROLOL TARTRATE 5 MG: 5 INJECTION INTRAVENOUS at 15:38

## 2022-07-18 RX ADMIN — Medication 10 MG/HR: at 20:30

## 2022-07-18 NOTE — PROGRESS NOTES
Procedure     ECG 12 Lead    Date/Time: 7/18/2022 1:27 PM  Performed by: Andriy Nielson MD  Authorized by: Andriy Nielson MD   Comparison: compared with previous ECG from 9/2/2019  Comparison to previous ECG: Completely different rhythm vs prior  Rhythm: atrial flutter  Rate: tachycardic    Clinical impression: abnormal EKG

## 2022-07-18 NOTE — PROGRESS NOTES
Date of Office Visit: 2022  Encounter Provider: Adrienne Holt MD  Place of Service: [facility] CARDIOLOGY  Patient Name: Minnie Ross  :1945    Chief complaint  Consult requested by Dr. Nielson for evaluation of atrial flutter.    History of Present Illness  Patient is a 76-year-old female with history of arthritis, irritable bowel syndrome, hypertension.  Patient has no prior cardiac history with history of chest pain in 2019 with a negative stress perfusion study at the time.  For the past 2 weeks has had mild dyspnea on exertion and fluid retention in her hands and legs.  She denies any palpitations or chest pressure..  She saw Dr. Nielson and was noted to have tachycardia with EKG that demonstrated atrial flutter with rapid rate.  She was sent to our chest pain unit where she is remained in atrial flutter with rapid rates up to 140 bpm.  Despite IV Lopressor tracy tachycardic with rates in the 130s.  She appears otherwise relatively comfortable.    Does not consume much caffeine.  She does consume modest amounts of alcohol.  She does use Celebrex for arthritis as well as topical nonsteroidals.  She has no history of bleeding.  She believes her blood pressure is under fair control.  She does snore at night and feels unrested when she awakens.  Has mild daytime somnolence.    Blood work 2020 includes normal thyroid labs, CBC unremarkable, proBNP 519 (normal), normal D-dimer.      Past Medical History:   Diagnosis Date   • Abnormal Pap smear of cervix    • Arthritis    • Cataract    • H/O complete eye exam 2018   • History of blood transfusion    • History of bone density study 2018   • Hypertension    • IBS (irritable bowel syndrome)      Past Surgical History:   Procedure Laterality Date   • APPENDECTOMY     • CATARACT EXTRACTION     • CHOLECYSTECTOMY     • KNEE SURGERY Right     Homero Gaston MD   • MAMMO BILATERAL     • MOUTH SURGERY  " 2013   • PAP SMEAR  12/29/2016   • PARTIAL KNEE ARTHROPLASTY  2010     Allergies as of 07/18/2022   • (No Known Allergies)     Social History     Socioeconomic History   • Marital status:      Spouse name: Julián   • Number of children: 1   Tobacco Use   • Smoking status: Former Smoker     Types: Cigarettes     Quit date: 2007     Years since quitting: 15.5   • Smokeless tobacco: Never Used   Substance and Sexual Activity   • Alcohol use: Yes     Alcohol/week: 14.0 standard drinks     Types: 14 Glasses of wine per week     Comment: occasionally   • Drug use: No   • Sexual activity: Never     Partners: Male     Birth control/protection: Post-menopausal     Family History   Problem Relation Age of Onset   • Hypertension Mother    • Heart disease Mother    • Arthritis Mother    • Deep vein thrombosis Brother    • Hyperlipidemia Sister      Review of Systems   Constitutional: Negative for chills, fever, weight gain and weight loss.   Cardiovascular: Positive for dyspnea on exertion and leg swelling.   Respiratory: Negative for cough, snoring and wheezing.    Hematologic/Lymphatic: Negative for bleeding problem. Does not bruise/bleed easily.   Skin: Negative for color change.   Musculoskeletal: Positive for joint pain and myalgias. Negative for falls.   Gastrointestinal: Negative for melena.   Genitourinary: Negative for hematuria.   Neurological: Positive for excessive daytime sleepiness.   Psychiatric/Behavioral: Negative for depression. The patient is not nervous/anxious.         Objective:     Vitals:    07/18/22 1412   BP: 132/88   BP Location: Right arm   Patient Position: Sitting   Pulse: (!) 137   Resp: 16   SpO2: 97%   Weight: 80.7 kg (178 lb)   Height: 160 cm (62.99\")     Body mass index is 31.54 kg/m².    Vitals reviewed.   Constitutional:       Appearance: Well-developed.   Eyes:      General: No scleral icterus.        Right eye: No discharge.      Conjunctiva/sclera: Conjunctivae normal.      Pupils: " Pupils are equal, round, and reactive to light.   HENT:      Head: Normocephalic.      Nose: Nose normal.   Neck:      Thyroid: No thyromegaly.      Vascular: No JVD.   Pulmonary:      Effort: Pulmonary effort is normal. No respiratory distress.      Breath sounds: Normal breath sounds. No wheezing. No rales.   Cardiovascular:      Tachycardia present. Regular rhythm. Normal S1. Normal S2.      Murmurs: There is no murmur.      No gallop.   Pulses:     Intact distal pulses.   Edema:     Peripheral edema absent.   Abdominal:      General: Bowel sounds are normal. There is no distension.      Palpations: Abdomen is soft.      Tenderness: There is no abdominal tenderness. There is no rebound.   Musculoskeletal: Normal range of motion.         General: No tenderness.      Cervical back: Normal range of motion and neck supple. Skin:     General: Skin is warm and dry.      Findings: No erythema or rash.   Neurological:      Mental Status: Alert and oriented to person, place, and time.   Psychiatric:         Behavior: Behavior normal.         Thought Content: Thought content normal.         Judgment: Judgment normal.       Lab Review:   Outside EKG shows atrial flutter with rapid rates with borderline left ventricular hypertrophy and ST-T wave changes.  Assessment:       Diagnosis Plan   1. Rapid heart rate  Cardiac Monitoring    Vital Signs - Once    Vital Signs - As Needed    Pulse Oximetry    Oxygen Therapy- Nasal Cannula; Titrate for SPO2: 92%, equal to or greater than    Insert Peripheral IV    sodium chloride 0.9 % flush 10 mL    nitroglycerin (NITROSTAT) SL tablet 0.4 mg    NPO Diet NPO Type: Strict NPO    Bathroom Privileges With Assistance    CBC & Differential    Comprehensive Metabolic Panel    Troponin T    D-Dimer    proBNP    Thyroid Panel With TSH    Cardiac Monitoring    Vital Signs - Once    Insert Peripheral IV    NPO Diet NPO Type: Strict NPO    Bathroom Privileges With Assistance    Troponin T     Troponin T    D-Dimer    D-Dimer    proBNP    proBNP    metoprolol tartrate (LOPRESSOR) injection 5 mg   2. Precordial pain  Cardiac Monitoring    Vital Signs - Once    Vital Signs - As Needed    Pulse Oximetry    Oxygen Therapy- Nasal Cannula; Titrate for SPO2: 92%, equal to or greater than    Insert Peripheral IV    sodium chloride 0.9 % flush 10 mL    nitroglycerin (NITROSTAT) SL tablet 0.4 mg    NPO Diet NPO Type: Strict NPO    Bathroom Privileges With Assistance    CBC & Differential    Comprehensive Metabolic Panel    Troponin T    D-Dimer    proBNP    Thyroid Panel With TSH    Cardiac Monitoring    Vital Signs - Once    Insert Peripheral IV    NPO Diet NPO Type: Strict NPO    Bathroom Privileges With Assistance    Troponin T    Troponin T    D-Dimer    D-Dimer    proBNP    proBNP   3. Shortness of breath  Cardiac Monitoring    Vital Signs - Once    Vital Signs - As Needed    Pulse Oximetry    Oxygen Therapy- Nasal Cannula; Titrate for SPO2: 92%, equal to or greater than    Insert Peripheral IV    sodium chloride 0.9 % flush 10 mL    nitroglycerin (NITROSTAT) SL tablet 0.4 mg    NPO Diet NPO Type: Strict NPO    Bathroom Privileges With Assistance    CBC & Differential    Comprehensive Metabolic Panel    Troponin T    D-Dimer    proBNP    Thyroid Panel With TSH    Cardiac Monitoring    Vital Signs - Once    Insert Peripheral IV    NPO Diet NPO Type: Strict NPO    Bathroom Privileges With Assistance    Troponin T    Troponin T    D-Dimer    D-Dimer    proBNP    proBNP   4. Palpitations  Cardiac Monitoring    Vital Signs - Once    Vital Signs - As Needed    Pulse Oximetry    Oxygen Therapy- Nasal Cannula; Titrate for SPO2: 92%, equal to or greater than    Insert Peripheral IV    sodium chloride 0.9 % flush 10 mL    nitroglycerin (NITROSTAT) SL tablet 0.4 mg    NPO Diet NPO Type: Strict NPO    Bathroom Privileges With Assistance    CBC & Differential    Comprehensive Metabolic Panel    Troponin T    D-Dimer     proBNP    Thyroid Panel With TSH    Cardiac Monitoring    Vital Signs - Once    Insert Peripheral IV    NPO Diet NPO Type: Strict NPO    Bathroom Privileges With Assistance    Troponin T    Troponin T    D-Dimer    D-Dimer    proBNP    proBNP   5. Dyspnea on exertion     6. Primary hypertension       Plan:       1.  Atrial flutter with rapid rates will admit to hospital, discontinue Lotrel and switch to Cardizem CD as well as benazepril.  Use IV Cardizem drip overnight to achieve rate control in the interim.  Discussed with patient that her cardioembolic risk is elevated and she should be on anticoagulant therapy I discussed options for anticoagulation warfarin versus NOAC.  Consider Xarelto for easier administration.  I discussed with her that she will need to stop Celebrex and other nonsteroidals.  If is not happy about this.  Also check an echocardiogram hopefully after better rate control in a.m.  2.  Hypertension, changes as above  3.  Edema, minimal currently.  proBNP normal  4.  Osteoarthritis.  As above.  5.  Probable sleep apnea.  Consider outpatient home sleep study          Time Spent: I spent 60 minutes caring for Minnie on this date of service. This time includes time spent by me in the following activities: preparing for the visit, reviewing tests, obtaining and/or reviewing a separately obtained history, performing a medically appropriate examination and/or evaluation, counseling and educating the patient/family/caregiver, ordering medications, tests, or procedures, referring and communicating with other health care professionals, documenting information in the medical record, independently interpreting results and communicating that information with the patient/family/caregiver and care coordination.   I spent 1 minutes on the separately reported service of ECG. This time is not included in the time used to support the E/M service also reported today.        Your medication list      ASK your doctor  about these medications      Instructions Last Dose Given Next Dose Due   amLODIPine-benazepril 5-20 MG per capsule  Commonly known as: LOTREL 5-20      Take 1 capsule by mouth Daily.       BIOTIN PO      Take  by mouth.       celecoxib 200 MG capsule  Commonly known as: CeleBREX      Take 1 capsule by mouth Daily.       ICAPS AREDS 2 PO      Take  by mouth.       VITEYES AREDS ADVANCED PO      Take  by mouth.       MILK THISTLE PLUS PO      Take  by mouth.       TURMERIC PO      Take  by mouth.              Patient is no longer taking -.  I corrected the med list to reflect this.  I did not stop these medications.      Dictated utilizing Dragon dictation

## 2022-07-19 ENCOUNTER — APPOINTMENT (OUTPATIENT)
Dept: CARDIOLOGY | Facility: HOSPITAL | Age: 77
End: 2022-07-19

## 2022-07-19 ENCOUNTER — READMISSION MANAGEMENT (OUTPATIENT)
Dept: CALL CENTER | Facility: HOSPITAL | Age: 77
End: 2022-07-19

## 2022-07-19 VITALS
OXYGEN SATURATION: 97 % | WEIGHT: 177 LBS | DIASTOLIC BLOOD PRESSURE: 72 MMHG | RESPIRATION RATE: 14 BRPM | SYSTOLIC BLOOD PRESSURE: 100 MMHG | HEART RATE: 66 BPM | TEMPERATURE: 97.8 F | HEIGHT: 62 IN | BODY MASS INDEX: 32.57 KG/M2

## 2022-07-19 LAB
AORTIC DIMENSIONLESS INDEX: 0.8 (DI)
BH CV ECHO MEAS - AO MAX PG: 4.4 MMHG
BH CV ECHO MEAS - AO MEAN PG: 2.25 MMHG
BH CV ECHO MEAS - AO ROOT DIAM: 2.6 CM
BH CV ECHO MEAS - AO V2 MAX: 105.2 CM/SEC
BH CV ECHO MEAS - AO V2 VTI: 23.6 CM
BH CV ECHO MEAS - AVA(I,D): 2.37 CM2
BH CV ECHO MEAS - EDV(CUBED): 73.5 ML
BH CV ECHO MEAS - EDV(MOD-SP2): 53 ML
BH CV ECHO MEAS - EDV(MOD-SP4): 58 ML
BH CV ECHO MEAS - EF(MOD-BP): 48.7 %
BH CV ECHO MEAS - EF(MOD-SP2): 47.2 %
BH CV ECHO MEAS - EF(MOD-SP4): 50 %
BH CV ECHO MEAS - ESV(CUBED): 29.2 ML
BH CV ECHO MEAS - ESV(MOD-SP2): 28 ML
BH CV ECHO MEAS - ESV(MOD-SP4): 29 ML
BH CV ECHO MEAS - FS: 26.5 %
BH CV ECHO MEAS - IVS/LVPW: 1 CM
BH CV ECHO MEAS - IVSD: 0.86 CM
BH CV ECHO MEAS - LAT PEAK E' VEL: 9.4 CM/SEC
BH CV ECHO MEAS - LV DIASTOLIC VOL/BSA (35-75): 32 CM2
BH CV ECHO MEAS - LV MASS(C)D: 111.4 GRAMS
BH CV ECHO MEAS - LV MAX PG: 2.6 MMHG
BH CV ECHO MEAS - LV MEAN PG: 1.3 MMHG
BH CV ECHO MEAS - LV SYSTOLIC VOL/BSA (12-30): 16 CM2
BH CV ECHO MEAS - LV V1 MAX: 80 CM/SEC
BH CV ECHO MEAS - LV V1 VTI: 18.7 CM
BH CV ECHO MEAS - LVIDD: 4.2 CM
BH CV ECHO MEAS - LVIDS: 3.1 CM
BH CV ECHO MEAS - LVOT AREA: 3 CM2
BH CV ECHO MEAS - LVOT DIAM: 1.96 CM
BH CV ECHO MEAS - LVPWD: 0.86 CM
BH CV ECHO MEAS - MED PEAK E' VEL: 7.9 CM/SEC
BH CV ECHO MEAS - MR MAX PG: 53.7 MMHG
BH CV ECHO MEAS - MR MAX VEL: 366.5 CM/SEC
BH CV ECHO MEAS - MV A DUR: 0.1 SEC
BH CV ECHO MEAS - MV A MAX VEL: 46.1 CM/SEC
BH CV ECHO MEAS - MV DEC SLOPE: 485.8 CM/SEC2
BH CV ECHO MEAS - MV DEC TIME: 187 MSEC
BH CV ECHO MEAS - MV E MAX VEL: 96.5 CM/SEC
BH CV ECHO MEAS - MV E/A: 2.09
BH CV ECHO MEAS - MV MAX PG: 3.7 MMHG
BH CV ECHO MEAS - MV MEAN PG: 1.89 MMHG
BH CV ECHO MEAS - MV P1/2T: 60.2 MSEC
BH CV ECHO MEAS - MV V2 VTI: 26.8 CM
BH CV ECHO MEAS - MVA(P1/2T): 3.7 CM2
BH CV ECHO MEAS - MVA(VTI): 2.1 CM2
BH CV ECHO MEAS - PA ACC TIME: 0.12 SEC
BH CV ECHO MEAS - PA PR(ACCEL): 25.5 MMHG
BH CV ECHO MEAS - PA V2 MAX: 73.3 CM/SEC
BH CV ECHO MEAS - RAP SYSTOLE: 8 MMHG
BH CV ECHO MEAS - RV MAX PG: 0.87 MMHG
BH CV ECHO MEAS - RV V1 MAX: 46.7 CM/SEC
BH CV ECHO MEAS - RV V1 VTI: 10.6 CM
BH CV ECHO MEAS - RVSP: 29.8 MMHG
BH CV ECHO MEAS - SI(MOD-SP2): 13.8 ML/M2
BH CV ECHO MEAS - SI(MOD-SP4): 16 ML/M2
BH CV ECHO MEAS - SV(LVOT): 56.1 ML
BH CV ECHO MEAS - SV(MOD-SP2): 25 ML
BH CV ECHO MEAS - SV(MOD-SP4): 29 ML
BH CV ECHO MEAS - TAPSE (>1.6): 2 CM
BH CV ECHO MEAS - TR MAX PG: 21.8 MMHG
BH CV ECHO MEAS - TR MAX VEL: 233.5 CM/SEC
BH CV ECHO MEASUREMENTS AVERAGE E/E' RATIO: 11.16
BH CV XLRA - RV BASE: 3.2 CM
BH CV XLRA - RV LENGTH: 6.9 CM
BH CV XLRA - RV MID: 1.74 CM
BH CV XLRA - TDI S': 4.4 CM/SEC
LEFT ATRIUM VOLUME INDEX: 28.5 ML/M2
MAXIMAL PREDICTED HEART RATE: 146 BPM
STRESS TARGET HR: 124 BPM

## 2022-07-19 PROCEDURE — 93306 TTE W/DOPPLER COMPLETE: CPT

## 2022-07-19 PROCEDURE — G0378 HOSPITAL OBSERVATION PER HR: HCPCS

## 2022-07-19 PROCEDURE — 93306 TTE W/DOPPLER COMPLETE: CPT | Performed by: INTERNAL MEDICINE

## 2022-07-19 PROCEDURE — 96372 THER/PROPH/DIAG INJ SC/IM: CPT

## 2022-07-19 PROCEDURE — 25010000002 ENOXAPARIN PER 10 MG: Performed by: INTERNAL MEDICINE

## 2022-07-19 PROCEDURE — A9270 NON-COVERED ITEM OR SERVICE: HCPCS | Performed by: INTERNAL MEDICINE

## 2022-07-19 PROCEDURE — 63710000001 DILTIAZEM CD 180 MG CAPSULE SUSTAINED-RELEASE 24 HR: Performed by: INTERNAL MEDICINE

## 2022-07-19 PROCEDURE — 96366 THER/PROPH/DIAG IV INF ADDON: CPT

## 2022-07-19 PROCEDURE — 99217 PR OBSERVATION CARE DISCHARGE MANAGEMENT: CPT | Performed by: INTERNAL MEDICINE

## 2022-07-19 RX ORDER — DILTIAZEM HYDROCHLORIDE 180 MG/1
180 CAPSULE, COATED, EXTENDED RELEASE ORAL
Qty: 30 CAPSULE | Refills: 11 | Status: SHIPPED | OUTPATIENT
Start: 2022-07-20 | End: 2022-10-25

## 2022-07-19 RX ORDER — SODIUM CHLORIDE 0.9 % (FLUSH) 0.9 %
10 SYRINGE (ML) INJECTION EVERY 12 HOURS SCHEDULED
Status: DISCONTINUED | OUTPATIENT
Start: 2022-07-19 | End: 2022-07-19 | Stop reason: HOSPADM

## 2022-07-19 RX ORDER — SODIUM CHLORIDE 0.9 % (FLUSH) 0.9 %
10 SYRINGE (ML) INJECTION AS NEEDED
Status: DISCONTINUED | OUTPATIENT
Start: 2022-07-19 | End: 2022-07-19 | Stop reason: HOSPADM

## 2022-07-19 RX ORDER — METOPROLOL SUCCINATE 25 MG/1
25 TABLET, EXTENDED RELEASE ORAL DAILY
Qty: 30 TABLET | Refills: 11 | Status: SHIPPED | OUTPATIENT
Start: 2022-07-19

## 2022-07-19 RX ADMIN — DILTIAZEM HYDROCHLORIDE 180 MG: 180 CAPSULE, COATED, EXTENDED RELEASE ORAL at 00:02

## 2022-07-19 RX ADMIN — ENOXAPARIN SODIUM 80 MG: 100 INJECTION SUBCUTANEOUS at 00:02

## 2022-07-19 RX ADMIN — Medication 10 ML: at 10:06

## 2022-07-19 NOTE — PHARMACY RECOMMENDATION
Both Xarelto and and Eliquis are covered. First fill on either is $459.50 to hit deducible then will go down to $47/month.    Ehsan Smith, [facility]

## 2022-07-19 NOTE — PROGRESS NOTES
[facility] Clinical Pharmacy Services: Pharmacy Education - Direct Oral Anticoagulant - Eliquis    Minnie Ross has been ordered Eliquis for afib/aflutter.     Counseling points included the following:  Eliquis's indication, patient's need for the medication, and dosing/frequency of this medication.  Enforced the importance of taking their medication as instructed every day and the reason why the medication is dosed that way.  Explained possible side effects of anticoagulation therapy, including increased risk of bleeding, and s/sx of bleeding. Also talked about ways to control bleeding for minor cuts and scrapes.  Emphasized the importance of going to the emergency room if any of the following occur: Falling and hitting your head; noticing bright red blood in urine or dark/tarry stools; vomiting up blood or vomit has a coffee-ground like texture; coughing up blood.  Discussed the importance of informing any physician or dentist that they have been started on a DOAC, in case they need to be taken off for a procedure.  Discussed all important drug interactions, including over-the-counter medications and supplements.  Instructed the patient not to begin or discontinue any medications without informing his/her physician/pharmacist.     I also explained to the patient that this medication may have a high copay associated with it and to let the provider know if it is unaffordable.     Patient expressed understanding and had no further questions.      Ehsan Smith, [facility]  Clinical Pharmacist

## 2022-07-19 NOTE — OUTREACH NOTE
Prep Survey    Flowsheet Row Responses   [facility] patient discharged from? [facility]   Is LACE score < 7 ? Yes   Emergency Room discharge w/ pulse ox? No   Eligibility [facility]   Date of Admission 07/18/22   Date of Discharge 07/19/22   Discharge Disposition Home or Self Care   Discharge diagnosis Atrial flutter   Does the patient have one of the following disease processes/diagnoses(primary or secondary)? Other   Does the patient have Home health ordered? No   Is there a DME ordered? No   Prep survey completed? Yes          BONILLA NUNES - Registered Nurse

## 2022-07-19 NOTE — PLAN OF CARE
Goal Outcome Evaluation:              Shift Summary:  Pt VSS, pt in Afib and was started on cardizem drip at 10mL/hr per orders and given PO cardizem per order. Pts HR dropped to mid 40s after starting PO cardizem and therefore held the IV cardizem per order and spoke to Vesta SUAZO who stated to leave off drip with her low heart rate and to leave off as long has her HR was within normal limits and to restart if HR gets back to the 90s. Pt has had no c/o pain. Pt on room air. Pt BP was 145/92 at start of shift and when HR got to the 40s her BP dropped to low 100s systolic. WCTM.

## 2022-07-19 NOTE — DISCHARGE SUMMARY
Hospital Discharge    Patient Name: Minnie Ross  Age/Sex: 76 y.o. female  : 1945  MRN: [id number]    Encounter Provider: Donny Jamil MD  Referring Provider: Adrienne Holt MD  Place of Service: [facility] CARDIOLOGY  Patient Care Team:  Andriy Nielson MD as PCP - General (Family Medicine)  Minnie Colindres MD as Consulting Physician (Dermatology)  Tammie Walsh APRN as Nurse Practitioner (Nurse Practitioner)  Blas Mai MD as Consulting Physician (Ophthalmology)  Andriy Hernandez MD (Internal Medicine)  Andriy Nielson MD as Referring Physician (Family Medicine)  Betito Ko MD as Consulting Physician (Hematology and Oncology)         Date of Discharge:  2022   Date of Admit: 2022    Discharge Condition: Good  Discharge Diagnosis:  Atrial flutter    Hospital Course:   Minnie Ross is a 76 y.o. female who presented with new onset atrial flutter.  Patient was admitted to the hospital rate controlled.  Echocardiogram showed low normal LV function with no significant valvular abnormalities.  With heart rate controlled appear to be flutter and would anticoagulate her and send her home.  Placed patient on Eliquis and she is worried about taking medicines twice a day I told her we need to do this for least 3 to 6 weeks we will get a flutter ablation and getting scared.  May have her follow-up with my nurse practitioner in 1 week she is to follow-up also with the EP in about 4 weeks.    Objective:  Temp:  [97.1 °F (36.2 °C)-98.4 °F (36.9 °C)] 97.8 °F (36.6 °C)  Heart Rate:  [] 66  Resp:  [14-16] 14  BP: (100-148)/(59-92) 100/72    Intake/Output Summary (Last 24 hours) at 2022 0937  Last data filed at 2022 0752  Gross per 24 hour   Intake 0 ml   Output --   Net 0 ml     Body mass index is 32.37 kg/m².      22  1754 22  0758   Weight: 80.7 kg (177 lb 14.6 oz) 80.3 kg (177 lb)     Weight change:     Physical  Exam:  Irregularly irregular without murmur or gallop  Lungs clear   Abdomen soft no distention  Extremities show no edema  Constitutional patient is resting comfortably.    Procedures Performed         Consults:  Consults     Date and Time Order Name Status Description    7/18/2022  9:10 PM Inpatient Pulmonology Consult            Pertinent Test Results:  Results from last 7 days   Lab Units 07/18/22  1411   SODIUM mmol/L 140   POTASSIUM mmol/L 4.1   CHLORIDE mmol/L 104   CO2 mmol/L 23.3   BUN mg/dL 17   CREATININE mg/dL 0.76   GLUCOSE mg/dL 105*   CALCIUM mg/dL 9.6   AST (SGOT) U/L 21   ALT (SGPT) U/L 22     Results from last 7 days   Lab Units 07/18/22  1411   TROPONIN T ng/mL <0.010     Results from last 7 days   Lab Units 07/18/22  1411   WBC 10*3/mm3 7.86   HEMOGLOBIN g/dL 15.0   HEMATOCRIT % 43.9   PLATELETS 10*3/mm3 349                   Invalid input(s): LDLCALC  Results from last 7 days   Lab Units 07/18/22  1411   PROBNP pg/mL 519.0     Results from last 7 days   Lab Units 07/18/22  1411   TSH uIU/mL 1.320       Discharge Medications     Discharge Medications      New Medications      Instructions Start Date   apixaban 5 MG tablet tablet  Commonly known as: ELIQUIS   5 mg, Oral, Every 12 Hours Scheduled      dilTIAZem  MG 24 hr capsule  Commonly known as: CARDIZEM CD   180 mg, Oral, Every 24 Hours Scheduled   Start Date: July 20, 2022     metoprolol succinate XL 25 MG 24 hr tablet  Commonly known as: TOPROL-XL   25 mg, Oral, Daily         Changes to Medications      Instructions Start Date   ICAPS AREDS 2 PO  What changed: Another medication with the same name was removed. Continue taking this medication, and follow the directions you see here.   Oral         Continue These Medications      Instructions Start Date   BIOTIN PO   Oral      TURMERIC PO   Oral         Stop These Medications    amLODIPine-benazepril 5-20 MG per capsule  Commonly known as: LOTREL 5-20     celecoxib 200 MG  capsule  Commonly known as: CeleBREX     MILK THISTLE PLUS PO            Discharge Diet:    Dietary Orders (From admission, onward)     Start     Ordered    07/19/22 0918  Diet Regular; Cardiac  Diet Effective Now        Question Answer Comment   Diet Texture / Consistency Regular    Common Modifiers Cardiac        07/19/22 0918                Activity at Discharge:   Ad rad.    Discharge disposition: Stable    Discharge Instructions and Follow ups:  No future appointments.   Follow-up Information     Andriy Nielson MD .    Specialty: Family Medicine  Contact information:  [address]  [facility] [id number] [phone]             Ellie Maldonado, GABRIELE. Call in 1 week(s).    Specialties: Cardiology, Nurse Practitioner  Contact information:  [address]  [place] [address]  [phone]             Daniel Dos Santos MD Follow up in 3 week(s).    Specialties: Cardiology, Cardiac Electrophysiology  Contact information:  [address]  [facility] [facility] [place] [address]  [phone]                         Test Results Pending at Discharge: Patient had some micronodules on CT scan.  She will need an appointment with pulmonology for follow-up of this.  I did not consult them in the hospital it would not  patient was discharged home     Donny Jamil MD  07/19/22  09:45 EDT    Time:38 min    EMR Dragon/Transcription disclaimer:   Much of this encounter note is an electronic transcription/translation of spoken language to printed text. The electronic translation of spoken language may permit erroneous, or at times, nonsensical words or phrases to be inadvertently transcribed; Although I have reviewed the note for such errors, some may still exist.

## 2022-07-20 ENCOUNTER — TRANSITIONAL CARE MANAGEMENT TELEPHONE ENCOUNTER (OUTPATIENT)
Dept: CALL CENTER | Facility: HOSPITAL | Age: 77
End: 2022-07-20

## 2022-07-20 NOTE — OUTREACH NOTE
Call Center TCM Note    Flowsheet Row Responses   [facility] patient discharged from? [place]   Does the patient have one of the following disease processes/diagnoses(primary or secondary)? Other   TCM attempt successful? No   Unsuccessful attempts Attempt 1          Angela Quinn LPN    7/20/2022, 09:54 EDT

## 2022-07-20 NOTE — OUTREACH NOTE
Call Center TCM Note    Flowsheet Row Responses   [facility] patient discharged from? [place]   Does the patient have one of the following disease processes/diagnoses(primary or secondary)? Other   TCM attempt successful? Yes   Call start time 1208   Call end time 1215   Discharge diagnosis Atrial flutter   Meds reviewed with patient/caregiver? Yes   Does the patient have all medications ordered at discharge? No   Is the patient taking all medications as directed (includes completed medication regime)? No   What is preventing the patient from taking all medications as directed? Other   Medication comments States pharmacy didn't have ready and is suppose to be ready after 2 today. She also wants to discuss stopping celebrex    Does the patient have a primary care provider?  Yes   Does the patient have an appointment with their PCP within 7 days of discharge? No   What is preventing the patient from scheduling follow up appointments within 7 days of discharge? Haven't had time   Nursing Interventions Advised patient to make appointment   Has the patient kept scheduled appointments due by today? N/A   Comments Pt would like sooner appt than Aug 1.  Will message office regarding an appt.     Psychosocial issues? No   Did the patient receive a copy of their discharge instructions? Yes   Nursing interventions Reviewed instructions with patient   What is the patient's perception of their health status since discharge? New symptoms unrelated to diagnosis   Is the patient/caregiver able to teach back signs and symptoms related to disease process for when to call PCP? Yes   Is the patient/caregiver able to teach back signs and symptoms related to disease process for when to call 911? Yes   Is the patient/caregiver able to teach back the hierarchy of who to call/visit for symptoms/problems? PCP, Specialist, Home health nurse, Urgent Care, ED, 911 Yes   Additional teach back comments States she is still having hip  issues that were never address at appt.  She is suppose to be going out of town and concerned with starting new medications.     TCM call completed? Yes   Wrap up additional comments Will message office regarding concerns with med changes and hip issues.          Angela Quinn LPN    7/20/2022, 12:19 EDT

## 2022-07-20 NOTE — CONSULTS
Group: [facility]         CONSULT NOTE    Patient Identification:    Minnie Ross  76 y.o.  female  1945  [id number]            Patient Care Team:  Andriy Nielson MD as PCP - General (Family Medicine)  Minnie Colindres MD as Consulting Physician (Dermatology)  Tammie Walsh APRN as Nurse Practitioner (Nurse Practitioner)  Blas Mai MD as Consulting Physician (Ophthalmology)  Andriy Hernandez MD (Internal Medicine)  Andriy Nielson MD as Referring Physician (Family Medicine)  Betito Ko MD as Consulting Physician (Hematology and Oncology)    Requesting physician: Dr. valentine    Reason for Consultation: Abnormal CAT scan    History of Present Illness: Patient is a 76-year-old female with a past medical history significant for hypertension, hyperlipidemia who was admitted to the hospital for tachycardia with EKG concerning for atrial flutter noted in the office requiring that admission to the hospital.  Patient underwent a work-up including echocardiogram and CT angiogram.  Patient was diagnosed to have atrial flutter and started on anticoagulation and plan is to consider an ablation as an outpatient in a few weeks.  Patient's CAT scan done recently as o/p showed improvement in prior lung nodules with new concern for micronodules and we have been asked to assist in the management of patient.    Patient states that she does not have any daily cough or sputum production fevers or chills.  She did not even notice the palpitations other than tachycardia initially noted by the staff in the office for her appointment.  She does not have any previous pulmonary pathology.  She already sees Dr. Santiago for lung nodule    I have reviewed the H&P as well as the notes from the other consultants taking care of the patient this admission as well as previous hospital notes (if any available) from our group physicians and summarized above      Objective     Review of Systems:  Constitutional: No  fever, chills, weight loss or loss of appetite.   ENMT: No sinus congestion, postnasal drip, epistaxis, sore throat  Cardiovascular: No chest pain, palpitation or legs swelling.    Respiratory: No hemoptysis, orthopnea, PND.  Gastrointestinal: No constipation, diarrhea or abdominal pain   Neurology: No headache, focal weakness, numbness or dizziness.   Musculoskeletal: No joint stiffness or swelling.   Psychiatry: No agitation or behavioral changes  Lymphatic: No swollen neck glands.  Integumentary: No rash.    Past Medical History:  Past Medical History:   Diagnosis Date   • Abnormal Pap smear of cervix    • Arthritis    • Cataract    • H/O complete eye exam 01/2018   • History of blood transfusion    • History of bone density study 04/2018   • Hypertension    • IBS (irritable bowel syndrome)        Past Surgical History:  Past Surgical History:   Procedure Laterality Date   • APPENDECTOMY  1960   • CATARACT EXTRACTION  2019   • CHOLECYSTECTOMY  1975   • KNEE SURGERY Right 2008    Homero Gaston MD   • MAMMO BILATERAL  2018   • MOUTH SURGERY  2013   • PAP SMEAR  12/29/2016   • PARTIAL KNEE ARTHROPLASTY  2010        Home Meds:  No medications prior to admission.       Allergies:  No Known Allergies    Social History:   Social History     Socioeconomic History   • Marital status:      Spouse name: Julián   • Number of children: 1   Tobacco Use   • Smoking status: Former Smoker     Types: Cigarettes     Quit date: 2007     Years since quitting: 15.5   • Smokeless tobacco: Never Used   Substance and Sexual Activity   • Alcohol use: Yes     Alcohol/week: 14.0 standard drinks     Types: 14 Glasses of wine per week     Comment: occasionally   • Drug use: No   • Sexual activity: Never     Partners: Male     Birth control/protection: Post-menopausal       Family History:  Family History   Problem Relation Age of Onset   • Hypertension Mother    • Heart disease Mother    • Arthritis Mother    • Deep vein thrombosis  "Brother    • Hyperlipidemia Sister        Physical Exam:  /72   Pulse 66   Temp 97.8 °F (36.6 °C) (Oral)   Resp 14   Ht 157.5 cm (62\")   Wt 80.3 kg (177 lb)   LMP  (LMP Unknown)   SpO2 97%   BMI 32.37 kg/m²    Body mass index is 32.37 kg/m². 97% 80.3 kg (177 lb)    Vent Settings                                           Physical Exam     Constitutional: Middle-aged pt in bed, no acute respiratory distress, no accessory muscle use  Head: - NCAT  Eyes: No pallor, anicteric conjunctivae, EOMI.  ENMT:  Mallampati 3, no oral thrush. Moist MM.   NECK: Trachea midline, No thyromegaly, no palpable cervical lymphadenopathy  Heart: RRR, no murmur. No pedal edema   Lungs: KERA +, No wheezes/ crackles heard    Abdomen: Soft. No tenderness, guarding or rigidity. No palpable masses  Extremities: Extremities warm and well perfused. No cyanosis/ clubbing  Neuro: Conscious, answers appropriately, no gross focal neuro deficits  Psych: Mood and affect appropriate    PPE recommended per [facility] infectious disease Isolation protocol for the current clinical scenario (as mentioned below) was followed.     LABS:  Results from last 7 days   Lab Units 07/18/22  1411   SODIUM mmol/L 140   POTASSIUM mmol/L 4.1   CHLORIDE mmol/L 104   CO2 mmol/L 23.3   BUN mg/dL 17   CREATININE mg/dL 0.76   CALCIUM mg/dL 9.6   BILIRUBIN mg/dL 0.5   ALK PHOS U/L 126*   ALT (SGPT) U/L 22   AST (SGOT) U/L 21   GLUCOSE mg/dL 105*   WBC 10*3/mm3 7.86   HEMOGLOBIN g/dL 15.0   PLATELETS 10*3/mm3 349   PROBNP pg/mL 519.0       Lab Results   Component Value Date    CALCIUM 9.6 07/18/2022                    Result Review      I have personally reviewed the results from the time of this admission to 7/20/2022 08:54 EDT and agree with these findings:  [x]  Laboratory  [x]  Microbiology  [x]  Radiology  []  EKG/Telemetry   [x]  Cardiology/Vascular   []  Pathology  [x]  Old records  []  Other:    Assessment   Incidental lung nodules  Atrial " ingrid  Previous smoker  Obese    RECOMMENDATIONS:  Patient was seen for incidental lung nodules noted on a CAT scan done prior to admission.  She states that she already sees Dr. Santiago in the clinic and has been getting yearly CAT scans for previous lung nodules.  I have discussed with her about the lung nodules noted on the scan - showed improvement in some with new micronodules again noted.  Patient states that she has an appointment with Dr. Santiago soon and will follow up with him as well.  I have encouraged her to do so. Need for f/u Cts d/w pt give prior smoking  Okay to discharge from my standpoint.    DVT prophylaxis:  scd    CODE STATUS:    Code Status and Medical Interventions:   Ordered at: 07/19/22 0935     Level Of Support Discussed With:    Patient     Code Status (Patient has no pulse and is not breathing):    CPR (Attempt to Resuscitate)     Medical Interventions (Patient has pulse or is breathing):    Full Support       Thank you for letting me participate in the care of this pleasant patient.  I have discussed my findings and recommendations with patient, family and nursing staff.     Torin Alexander MD  7/20/2022  08:54 EDT

## 2022-07-21 ENCOUNTER — OFFICE VISIT (OUTPATIENT)
Dept: FAMILY MEDICINE CLINIC | Facility: CLINIC | Age: 77
End: 2022-07-21

## 2022-07-21 VITALS
HEART RATE: 66 BPM | WEIGHT: 178 LBS | RESPIRATION RATE: 16 BRPM | SYSTOLIC BLOOD PRESSURE: 120 MMHG | DIASTOLIC BLOOD PRESSURE: 63 MMHG | OXYGEN SATURATION: 97 % | BODY MASS INDEX: 32.76 KG/M2 | TEMPERATURE: 98.4 F | HEIGHT: 62 IN

## 2022-07-21 DIAGNOSIS — M25.552 LEFT HIP PAIN: ICD-10-CM

## 2022-07-21 DIAGNOSIS — Z09 HOSPITAL DISCHARGE FOLLOW-UP: ICD-10-CM

## 2022-07-21 DIAGNOSIS — I48.92 ATRIAL FLUTTER, UNSPECIFIED TYPE: Primary | ICD-10-CM

## 2022-07-21 PROCEDURE — 99495 TRANSJ CARE MGMT MOD F2F 14D: CPT | Performed by: FAMILY MEDICINE

## 2022-07-21 NOTE — PROGRESS NOTES
Transitional Care Follow Up Visit  Subjective     Minnie Ross is a 76 y.o. female who presents for a transitional care management visit.    Within 48 business hours after discharge our office contacted her via telephone to coordinate her care and needs.      I reviewed and discussed the details of that call along with the discharge summary, hospital problems, inpatient lab results, inpatient diagnostic studies, and consultation reports with Minnie.     Current outpatient and discharge medications have been reconciled for the patient.  Reviewed by: Andriy Nielson MD      Date of TCM Phone Call 7/19/2022   [facility]   Date of Admission 7/18/2022   Date of Discharge 7/19/2022   Discharge Disposition Home or Self Care     Risk for Readmission (LACE) Score: 1 (7/19/2022  6:01 AM)      History of Present Illness   Course During Hospital Stay:      Date of Discharge:  7/19/2022   Date of Admit: 7/18/2022     Discharge Condition: Good  Discharge Diagnosis:  Atrial flutter     Hospital Course:   Minnie Ross is a 76 y.o. female who presented with new onset atrial flutter.  Patient was admitted to the hospital rate controlled.  Echocardiogram showed low normal LV function with no significant valvular abnormalities.  With heart rate controlled appear to be flutter and would anticoagulate her and send her home.  Placed patient on Eliquis and she is worried about taking medicines twice a day I told her we need to do this for least 3 to 6 weeks we will get a flutter ablation and getting scared.  May have her follow-up with my nurse practitioner in 1 week she is to follow-up also with the EP in about 4 weeks.    Current outpatient and discharge medications have been reconciled for the patient.  Reviewed by: Andriy Nielson MD      Pt with some chronic hip issues, worse with being off the Celebrex.       The following portions of the patient's history were reviewed and updated as appropriate:  "allergies, current medications, past family history, past medical history, past social history, past surgical history and problem list.    Review of Systems   Constitutional: Negative for activity change, chills and fever.   Respiratory: Negative for cough.    Cardiovascular: Negative for chest pain.   Psychiatric/Behavioral: Negative for dysphoric mood.       /63   Pulse 66   Temp 98.4 °F (36.9 °C) (Oral)   Resp 16   Ht 157.5 cm (62\")   Wt 80.7 kg (178 lb)   LMP  (LMP Unknown)   SpO2 97%   BMI 32.56 kg/m²     Objective   Physical Exam  Constitutional:       General: She is not in acute distress.     Appearance: She is well-developed.   Cardiovascular:      Rate and Rhythm: Normal rate and regular rhythm.   Pulmonary:      Effort: Pulmonary effort is normal.      Breath sounds: Normal breath sounds.   Neurological:      Mental Status: She is alert and oriented to person, place, and time.   Psychiatric:         Behavior: Behavior normal.         Thought Content: Thought content normal.     Hospital records reviewed with pt confirming HPI.      Assessment & Plan   Diagnoses and all orders for this visit:    1. Atrial flutter, unspecified type (HCC) (Primary)    2. Hospital discharge follow-up    3. Left hip pain  -     Ambulatory Referral to Orthopedic Surgery    Reviewed pt's medication changes with her and explained what they are for. Pt to keep cardiology f/u appt next week.   Pt to gradually resume normal activity.             "

## 2022-07-28 ENCOUNTER — OFFICE VISIT (OUTPATIENT)
Dept: CARDIOLOGY | Facility: CLINIC | Age: 77
End: 2022-07-28

## 2022-07-28 VITALS
SYSTOLIC BLOOD PRESSURE: 110 MMHG | DIASTOLIC BLOOD PRESSURE: 76 MMHG | BODY MASS INDEX: 31.18 KG/M2 | HEIGHT: 63 IN | OXYGEN SATURATION: 98 % | WEIGHT: 176 LBS | HEART RATE: 94 BPM

## 2022-07-28 DIAGNOSIS — I48.3 TYPICAL ATRIAL FLUTTER: Primary | ICD-10-CM

## 2022-07-28 PROCEDURE — 93000 ELECTROCARDIOGRAM COMPLETE: CPT | Performed by: NURSE PRACTITIONER

## 2022-07-28 PROCEDURE — 99214 OFFICE O/P EST MOD 30 MIN: CPT | Performed by: NURSE PRACTITIONER

## 2022-07-28 NOTE — PROGRESS NOTES
"    CARDIOLOGY        Patient Name: Minnie Ross  :1945  Age: 77 y.o.  Primary Cardiologist: Donny Jamil MD  Encounter Provider:  GABRIELE Romero    Date of Service: 22            CHIEF COMPLAINT / REASON FOR OFFICE VISIT     Atrial Flutter (B[facility] 1 wk f/u)      HISTORY OF PRESENT ILLNESS       HPI  Minnie Ross is a 77 y.o. female who presents today for 1 week reevaluation.     Pt has a  history significant for atrial flutter.    Review of medical records reveals patient hospitalized  - 2022 for new onset atrial flutter.  Patient was admitted to the hospital and her rate was controlled.  Echocardiogram revealed a low-normal LVEF with no significant valvular abnormalities.  Patient was placed on apixaban, referral placed to EP in 4 weeks to determine if patient would be in need of cardiac ablation.  Patient also placed on diltiazem and metoprolol succinate for rate control.    Patient states since discharge from hospital her heart rate has been much more controlled.  She has been compliant with diltiazem and metoprolol.  Anticoagulated with apixaban and denies any missed doses.  Currently asymptomatic and denies chest pain, dyspnea at rest or with exertion, palpitations, lightheadedness, edema, fatigue.    The following portions of the patient's history were reviewed and updated as appropriate: allergies, current medications, past family history, past medical history, past social history, past surgical history and problem list.      VITAL SIGNS     Visit Vitals  /76 (BP Location: Right arm, Patient Position: Sitting, Cuff Size: Adult)   Pulse 94   Ht 160 cm (63\")   Wt 79.8 kg (176 lb)   LMP  (LMP Unknown)   SpO2 98%   BMI 31.18 kg/m²         Wt Readings from Last 3 Encounters:   22 79.8 kg (176 lb)   22 80.7 kg (178 lb)   22 80.3 kg (177 lb)     Body mass index is 31.18 kg/m².      REVIEW OF SYSTEMS   Review of Systems   Constitutional: Negative for " chills, fever, weight gain and weight loss.   Cardiovascular: Negative for leg swelling.   Respiratory: Negative for cough, snoring and wheezing.    Hematologic/Lymphatic: Negative for bleeding problem. Does not bruise/bleed easily.   Skin: Negative for color change.   Musculoskeletal: Negative for falls, joint pain and myalgias.   Gastrointestinal: Negative for melena.   Genitourinary: Negative for hematuria.   Neurological: Negative for excessive daytime sleepiness.   Psychiatric/Behavioral: Negative for depression. The patient is not nervous/anxious.            PHYSICAL EXAMINATION     Vitals and nursing note reviewed.   Constitutional:       Appearance: Normal appearance. Well-developed.   Eyes:      Conjunctiva/sclera: Conjunctivae normal.   Neck:      Vascular: No carotid bruit.   Pulmonary:      Breath sounds: Normal breath sounds.   Cardiovascular:      Normal rate. Regular rhythm. Normal S1 with normal intensity. Normal S2 with normal intensity.      Murmurs: There is no murmur.      No gallop. No click. No rub.   Edema:     Peripheral edema absent.   Musculoskeletal: Normal range of motion. Skin:     General: Skin is warm and dry.   Neurological:      Mental Status: Alert and oriented to person, place, and time.      GCS: GCS eye subscore is 4. GCS verbal subscore is 5. GCS motor subscore is 6.   Psychiatric:         Speech: Speech normal.         Behavior: Behavior normal.         Thought Content: Thought content normal.         Judgment: Judgment normal.           REVIEWED DATA       ECG 12 Lead    Date/Time: 7/28/2022 9:41 AM  Performed by: Ellie Maldonado APRN  Authorized by: Ellie Maldonado APRN   Comparison: compared with previous ECG from 7/18/2022  Rhythm: atrial flutter  Rate: normal  BPM: 94  Conduction: conduction normal  ST Segments: ST segments normal  T Waves: T waves normal  QRS axis: normal    Clinical impression: abnormal EKG            Cardiac Procedures:  1. Myocardial perfusion  stress test 9/2/2019.  No evidence of ischemia.  Impressions consistent with low risk study.  2. Echocardiogram 7/19/2022.  LVEF 51-55%.  Mild mitral valve regurgitation.  Mild tricuspid valve regurgitation.      BUN   Date Value Ref Range Status   07/18/2022 17 8 - 23 mg/dL Final     Creatinine   Date Value Ref Range Status   07/18/2022 0.76 0.57 - 1.00 mg/dL Final     Potassium   Date Value Ref Range Status   07/18/2022 4.1 3.5 - 5.2 mmol/L Final     ALT (SGPT)   Date Value Ref Range Status   07/18/2022 22 1 - 33 U/L Final     AST (SGOT)   Date Value Ref Range Status   07/18/2022 21 1 - 32 U/L Final           ASSESSMENT & PLAN     Diagnoses and all orders for this visit:    1. Typical atrial flutter (HCC) (Primary)  · Patient is in rate controlled atrial flutter on ECG  · Currently asymptomatic  · Continue diltiazem and metoprolol succinate  · Anticoagulated with apixaban, denies bleeding complications      Keep previously scheduled appointment with the       Future Appointments       Provider Department Center    8/23/2022 11:00 AM Daniel Dos Santos MD [facility]    11/3/2022 2:00 PM [facility] DEXA 1 [facility] BONE DENSITY [place]                MEDICATIONS         Discharge Medications          Accurate as of July 28, 2022 10:16 AM. If you have any questions, ask your nurse or doctor.            Continue These Medications      Instructions Start Date   apixaban 5 MG tablet tablet  Commonly known as: ELIQUIS   5 mg, Oral, Every 12 Hours Scheduled      dilTIAZem  MG 24 hr capsule  Commonly known as: CARDIZEM CD   180 mg, Oral, Every 24 Hours Scheduled      ICAPS AREDS 2 PO   Oral      metoprolol succinate XL 25 MG 24 hr tablet  Commonly known as: TOPROL-XL   25 mg, Oral, Daily      TURMERIC PO   Oral      ZINC 15 PO   Oral                 **Dragon Disclaimer:   Much of this encounter note is an electronic transcription/translation of spoken language to  printed text. The electronic translation of spoken language may permit erroneous, or at times, nonsensical words or phrases to be inadvertently transcribed. Although I have reviewed the note for such errors, some may still exist.

## 2022-07-29 ENCOUNTER — TRANSCRIBE ORDERS (OUTPATIENT)
Dept: ADMINISTRATIVE | Facility: HOSPITAL | Age: 77
End: 2022-07-29

## 2022-07-29 NOTE — TELEPHONE ENCOUNTER
At pt's visit yesterday she requested Eliquis samples. We did not have these available at the [facility] office, I informed pt if she could come to the main office to  today I will put them up there today. She is coming to pick them up. // HG

## 2022-08-09 ENCOUNTER — OFFICE VISIT (OUTPATIENT)
Dept: ORTHOPEDIC SURGERY | Facility: CLINIC | Age: 77
End: 2022-08-09

## 2022-08-09 VITALS — BODY MASS INDEX: 31.01 KG/M2 | HEIGHT: 63 IN | TEMPERATURE: 97.5 F | WEIGHT: 175 LBS

## 2022-08-09 DIAGNOSIS — M70.62 GREATER TROCHANTERIC BURSITIS OF LEFT HIP: Primary | ICD-10-CM

## 2022-08-09 DIAGNOSIS — M76.892 HIP ABDUCTOR TENDINITIS, LEFT: ICD-10-CM

## 2022-08-09 PROCEDURE — 99204 OFFICE O/P NEW MOD 45 MIN: CPT | Performed by: ORTHOPAEDIC SURGERY

## 2022-08-09 PROCEDURE — 73502 X-RAY EXAM HIP UNI 2-3 VIEWS: CPT | Performed by: ORTHOPAEDIC SURGERY

## 2022-08-09 NOTE — PROGRESS NOTES
General Exam    Patient: Minnie Ross    YOB: 1945    Medical Record Number: [id number]    Chief Complaints: Left hip pain    History of Present Illness:     77 y.o. female patient who presents for evaluation and treatment of left hip pain.  Patient states she has had issues throughout the month located on the lateral aspect of her hip denies any groin pain.  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  She reports increased activity and stairs make things worse as well as laying on that side.    Denies any numbness or tingling.  Denies any fevers, cough or shortness of breath.    Allergies: No Known Allergies    Home Medications:      Current Outpatient Medications:   •  apixaban (ELIQUIS) 5 MG tablet tablet, Take 1 tablet by mouth Every 12 (Twelve) Hours., Disp: 42 tablet, Rfl: 0  •  Calcium-Magnesium-Vitamin D (CITRACAL CALCIUM+D PO), , Disp: , Rfl:   •  dilTIAZem CD (CARDIZEM CD) 180 MG 24 hr capsule, Take 1 capsule by mouth Daily., Disp: 30 capsule, Rfl: 11  •  metoprolol succinate XL (TOPROL-XL) 25 MG 24 hr tablet, Take 1 tablet by mouth Daily., Disp: 30 tablet, Rfl: 11  •  Multiple Vitamins-Minerals (ICAPS AREDS 2 PO), Take  by mouth., Disp: , Rfl:   •  TURMERIC PO, Take  by mouth., Disp: , Rfl:   •  Zinc Sulfate (ZINC 15 PO), Take  by mouth., Disp: , Rfl:     Past Medical History:   Diagnosis Date   • Abnormal Pap smear of cervix    • Arthritis    • Cataract    • H/O complete eye exam 01/2018   • History of blood transfusion    • History of bone density study 04/2018   • Hypertension    • IBS (irritable bowel syndrome)        Past Surgical History:   Procedure Laterality Date   • APPENDECTOMY  1960   • CATARACT EXTRACTION  2019   • CHOLECYSTECTOMY  1975   • KNEE SURGERY Right 2008    Homero Gaston MD   • MAMMO BILATERAL  2018   • MOUTH SURGERY  2013   • PAP SMEAR  12/29/2016   • PARTIAL KNEE ARTHROPLASTY  2010       Social History  "    Occupational History     Employer: RETIRED   Tobacco Use   • Smoking status: Former Smoker     Types: Cigarettes     Quit date: 2007     Years since quitting: 15.6   • Smokeless tobacco: Never Used   Substance and Sexual Activity   • Alcohol use: Yes     Alcohol/week: 14.0 standard drinks     Types: 14 Glasses of wine per week     Comment: occasionally   • Drug use: No   • Sexual activity: Never     Partners: Male     Birth control/protection: Post-menopausal      Social History     Social History Narrative    New GYN pt 1995       Family History   Problem Relation Age of Onset   • Hypertension Mother    • Heart disease Mother    • Arthritis Mother    • Deep vein thrombosis Brother    • Hyperlipidemia Sister        Review of Systems:      Constitutional: Denies fever, shaking or chills         All other pertinent positives and negatives as noted above in HPI.    Physical Exam: 77 y.o. female    Vitals:    08/09/22 1358   Temp: 97.5 °F (36.4 °C)   Weight: 79.4 kg (175 lb)   Height: 160 cm (63\")       General:  Patient is awake and alert.  Appears in no acute distress or discomfort.      Musculoskeletal/Extremities:    Left lower extremity examined positive tenderness palpation of the greater trochanter and some discomfort about the abductors.  Overall hip range of motion is full and painless.  Negative Stinchfield and straight leg raise.  Slight antalgic gait unassisted.  Motor and sensory intact distally.         Radiology:       3 views of the left hip taken reviewed AP lateral to evaluate the patient's complaint/s.    Imaging demonstrates some minimal to mild degenerative changes with some early bone sclerosis.  No acute fractures noted.     No imaging for comparison.    Assessment: Left hip greater trochanteric bursitis, hip abductor tendinitis      Plan:      I discussed the findings with the patient recommend conservative treatment at this time.  Recommend rest, activity modification, formal physical " therapy, ice, anti-inflammatory gel medication as she cannot take oral medication as she has been recently placed on blood thinner due to A. fib.  Things or not resolving next 3 to 4 weeks may return for an injection.  Otherwise I told her sometimes this takes 6 to 8 weeks to resolve and can return thus importance of therapy.     I did give her a sample of Pennsaid and also recommended continue to try some Voltaren gel going forward.      We will plan for follow up as needed.    All questions were answered.  Patient understands and agrees with the plan.    Homero Mleo MD    08/09/2022    CC to Andriy Nielson MD

## 2022-08-11 ENCOUNTER — PATIENT ROUNDING (BHMG ONLY) (OUTPATIENT)
Dept: ORTHOPEDIC SURGERY | Facility: CLINIC | Age: 77
End: 2022-08-11

## 2022-08-11 NOTE — PROGRESS NOTES
A [employer] Message has been sent to the patient for PATIENT ROUNDING with [facility]

## 2022-08-23 ENCOUNTER — OFFICE VISIT (OUTPATIENT)
Dept: CARDIOLOGY | Facility: CLINIC | Age: 77
End: 2022-08-23

## 2022-08-23 ENCOUNTER — TELEPHONE (OUTPATIENT)
Dept: ORTHOPEDIC SURGERY | Facility: CLINIC | Age: 77
End: 2022-08-23

## 2022-08-23 ENCOUNTER — TRANSCRIBE ORDERS (OUTPATIENT)
Dept: CARDIOLOGY | Facility: CLINIC | Age: 77
End: 2022-08-23

## 2022-08-23 VITALS
WEIGHT: 174 LBS | BODY MASS INDEX: 30.83 KG/M2 | DIASTOLIC BLOOD PRESSURE: 92 MMHG | HEIGHT: 63 IN | SYSTOLIC BLOOD PRESSURE: 130 MMHG | HEART RATE: 116 BPM

## 2022-08-23 DIAGNOSIS — Z01.810 PRE-OPERATIVE CARDIOVASCULAR EXAMINATION: Primary | ICD-10-CM

## 2022-08-23 DIAGNOSIS — I48.92 ATRIAL FLUTTER WITH CONTROLLED RESPONSE: Primary | ICD-10-CM

## 2022-08-23 DIAGNOSIS — Z13.6 SCREENING FOR ISCHEMIC HEART DISEASE: ICD-10-CM

## 2022-08-23 DIAGNOSIS — I48.92 ATRIAL FLUTTER, UNSPECIFIED TYPE: ICD-10-CM

## 2022-08-23 PROCEDURE — 99214 OFFICE O/P EST MOD 30 MIN: CPT | Performed by: INTERNAL MEDICINE

## 2022-08-23 PROCEDURE — 93000 ELECTROCARDIOGRAM COMPLETE: CPT | Performed by: INTERNAL MEDICINE

## 2022-08-23 NOTE — PROGRESS NOTES
Date of Office Visit: 2022  Encounter Provider: Daniel Dos Santos MD  Place of Service: [facility]  Patient Name: Minnie Ross  :1945    Chief Complaint   Patient presents with   • new onset AFIB     New Patient Consult    • typical atrial flutter   :     HPI: Minnie Ross is a 77 y.o. female who presents today for atrial flutter    She was found to have atrial flutter during PCP last month.  She was there for hip pain and was noted to have elevated heart rate and referred to the ED.      She was treated with anticoagulation and metoprolol and dilitiazem.     She is not particularly aware of the atrial flutter at this time.           Past Medical History:   Diagnosis Date   • Abnormal Pap smear of cervix    • Arthritis    • Cataract    • H/O complete eye exam 2018   • History of blood transfusion    • History of bone density study 2018   • Hypertension    • IBS (irritable bowel syndrome)        Past Surgical History:   Procedure Laterality Date   • APPENDECTOMY     • CATARACT EXTRACTION     • CHOLECYSTECTOMY     • KNEE SURGERY Right     Homero Gaston MD   • MAMMO BILATERAL     • MOUTH SURGERY     • PAP SMEAR  2016   • PARTIAL KNEE ARTHROPLASTY         Social History     Socioeconomic History   • Marital status:      Spouse name: Julián   • Number of children: 1   Tobacco Use   • Smoking status: Former Smoker     Types: Cigarettes     Quit date:      Years since quitting: 15.6   • Smokeless tobacco: Never Used   Vaping Use   • Vaping Use: Never used   Substance and Sexual Activity   • Alcohol use: Yes     Alcohol/week: 14.0 standard drinks     Types: 14 Glasses of wine per week     Comment: occasionally   • Drug use: No   • Sexual activity: Never     Partners: Male     Birth control/protection: Post-menopausal       Family History   Problem Relation Age of Onset   • Hypertension Mother    • Heart disease Mother  "   • Arthritis Mother    • Deep vein thrombosis Brother    • Hyperlipidemia Sister        Review of Systems   Constitutional: Negative.   Cardiovascular: Negative.    Respiratory: Negative.    Gastrointestinal: Negative.        No Known Allergies      Current Outpatient Medications:   •  apixaban (ELIQUIS) 5 MG tablet tablet, Take 1 tablet by mouth Every 12 (Twelve) Hours., Disp: 42 tablet, Rfl: 0  •  Calcium-Magnesium-Vitamin D (CITRACAL CALCIUM+D PO), , Disp: , Rfl:   •  dilTIAZem CD (CARDIZEM CD) 180 MG 24 hr capsule, Take 1 capsule by mouth Daily., Disp: 30 capsule, Rfl: 11  •  metoprolol succinate XL (TOPROL-XL) 25 MG 24 hr tablet, Take 1 tablet by mouth Daily., Disp: 30 tablet, Rfl: 11  •  Multiple Vitamins-Minerals (ICAPS AREDS 2 PO), Take 1 capsule by mouth 2 (Two) Times a Day., Disp: , Rfl:   •  TURMERIC PO, Take  by mouth., Disp: , Rfl:   •  Zinc Sulfate (ZINC 15 PO), Take  by mouth., Disp: , Rfl:       Objective:     Vitals:    08/23/22 1101   BP: 130/92   Pulse: 116   Weight: 78.9 kg (174 lb)   Height: 160 cm (63\")     Body mass index is 30.82 kg/m².    PHYSICAL EXAM:    Vitals and nursing note reviewed.   Constitutional:       Appearance: Normal and healthy appearance.   HENT:    Mouth/Throat:      Pharynx: Oropharynx is clear.   Pulmonary:      Effort: Pulmonary effort is normal.      Breath sounds: Normal breath sounds.   Cardiovascular:      Tachycardia present. Irregular rhythm.   Edema:     Peripheral edema absent.   Skin:     General: Skin is warm.   Neurological:      General: No focal deficit present.      Mental Status: Alert, oriented to person, place, and time and oriented to person, place and time.   Psychiatric:         Attention and Perception: Attention and perception normal.         Mood and Affect: Mood and affect normal.         Behavior: Behavior is cooperative.             ECG 12 Lead    Date/Time: 8/23/2022 11:18 AM  Performed by: Daniel Dos Santos MD  Authorized by: Raya, " Daniel Pisano MD   Comparison: compared with previous ECG from 7/22/2022  Similar to previous ECG  Rhythm: atrial flutter        I reviewed her echocardiogram.  Her ejection fraction is normal.       Assessment:       Diagnosis Plan   1. Atrial flutter with controlled response (HCC)  Case Request EP Lab: Ablation atrial flutter   2. Atrial flutter, unspecified type (HCC)            Plan:       Atrial flutter with minimal symptoms.  I do think we should pursue rhythm control due to marginal rate control despite two medications.  We discussed cardioversion vs ablation.  She prefers ablation, due to lower risk of recurrence.  We discussed risk including stroke, and the need to stay on anticoagulation despite restoration of sinus rhythm.  She expressed understanding.     As always, it has been a pleasure to participate in your patient's care.      Sincerely,         Daniel Dos Santos MD

## 2022-08-23 NOTE — TELEPHONE ENCOUNTER
Caller: PATIENT     Relationship to patient: SELF     Best call back number: [phone]    Chief complaint: LEFT HIP PAIN     Type of visit: INJECTION     Requested date: Thursday., AT [facility] OFFICE IF POSSIBLE.     Additional notes PT. STATES THAT SHE IS GOING OUT OF TOWN NEXT WEEK.   STATES THAT DR. CANTU TO CALL TO SEE IF SHE CAN GET ADDED ON THIS WEEK FOR CORTISONE INJECTION TO LEFT HIP.

## 2022-08-24 NOTE — TELEPHONE ENCOUNTER
Please advise and sign off on lab order   What Is The Reason For Today's Visit?: Full Body Skin Examination What Is The Reason For Today's Visit? (Being Monitored For X): the re-examination of lesions previously examined

## 2022-08-24 NOTE — TELEPHONE ENCOUNTER
PATIENT RETURNED CALL TO SCHEDULE, REQUESTS THAT OFFICE CALL THIS NUMBER:   [phone]      UNABLE TO WARM TRANSFER

## 2022-08-25 ENCOUNTER — CLINICAL SUPPORT (OUTPATIENT)
Dept: ORTHOPEDIC SURGERY | Facility: CLINIC | Age: 77
End: 2022-08-25

## 2022-08-25 VITALS — BODY MASS INDEX: 31.36 KG/M2 | TEMPERATURE: 98.7 F | RESPIRATION RATE: 18 BRPM | HEIGHT: 63 IN | WEIGHT: 177 LBS

## 2022-08-25 DIAGNOSIS — M70.62 GREATER TROCHANTERIC BURSITIS OF LEFT HIP: Primary | ICD-10-CM

## 2022-08-25 PROCEDURE — 20610 DRAIN/INJ JOINT/BURSA W/O US: CPT | Performed by: ORTHOPAEDIC SURGERY

## 2022-08-25 RX ORDER — LIDOCAINE HYDROCHLORIDE 10 MG/ML
2 INJECTION, SOLUTION EPIDURAL; INFILTRATION; INTRACAUDAL; PERINEURAL
Status: COMPLETED | OUTPATIENT
Start: 2022-08-25 | End: 2022-08-25

## 2022-08-25 RX ORDER — METHYLPREDNISOLONE ACETATE 80 MG/ML
80 INJECTION, SUSPENSION INTRA-ARTICULAR; INTRALESIONAL; INTRAMUSCULAR; SOFT TISSUE
Status: COMPLETED | OUTPATIENT
Start: 2022-08-25 | End: 2022-08-25

## 2022-08-25 RX ADMIN — METHYLPREDNISOLONE ACETATE 80 MG: 80 INJECTION, SUSPENSION INTRA-ARTICULAR; INTRALESIONAL; INTRAMUSCULAR; SOFT TISSUE at 10:53

## 2022-08-25 RX ADMIN — LIDOCAINE HYDROCHLORIDE 2 ML: 10 INJECTION, SOLUTION EPIDURAL; INFILTRATION; INTRACAUDAL; PERINEURAL at 10:53

## 2022-08-25 NOTE — PROGRESS NOTES
"Patient: Minnie Ross  YOB: 1945 77 y.o. female  Medical Record Number: [id number]    Chief Complaint:   Chief Complaint   Patient presents with   • Left Hip - Follow-up       History of Present Illness:Minnie Ross is a 77 y.o. female who presents for follow-up of left hip pain.  Diagnosed with greater trochanteric bursitis.  Has been doing some therapy and tried some over-the-counter anti-inflammatory gel still quite symptomatic.  Here for an injection today.    Allergies: No Known Allergies    Medications:   Current Outpatient Medications   Medication Sig Dispense Refill   • apixaban (ELIQUIS) 5 MG tablet tablet Take 1 tablet by mouth Every 12 (Twelve) Hours. 42 tablet 0   • Calcium-Magnesium-Vitamin D (CITRACAL CALCIUM+D PO)      • dilTIAZem CD (CARDIZEM CD) 180 MG 24 hr capsule Take 1 capsule by mouth Daily. 30 capsule 11   • metoprolol succinate XL (TOPROL-XL) 25 MG 24 hr tablet Take 1 tablet by mouth Daily. 30 tablet 11   • Multiple Vitamins-Minerals (ICAPS AREDS 2 PO) Take 1 capsule by mouth 2 (Two) Times a Day.     • TURMERIC PO Take  by mouth.     • Zinc Sulfate (ZINC 15 PO) Take  by mouth.       No current facility-administered medications for this visit.         The following portions of the patient's history were reviewed and updated as appropriate: allergies, current medications, past family history, past medical history, past social history, past surgical history and problem list.    Review of Systems:   A 14 point review of systems was performed. All systems negative except pertinent positives/negative listed in HPI above    Physical Exam:   Vitals:    08/25/22 0941   Resp: 18   Temp: 98.7 °F (37.1 °C)   Weight: 80.3 kg (177 lb)   Height: 160 cm (63\")       General: A and O x 3, ASA, NAD    SCLERA:    Normal    DENTITION:   Normal    Left lower extremity  positive tenderness palpation of the greater flakita        Assessment/Plan:  Left hip greater trochanter bursitis    Proceed " with left hip steroid injection.  Also recommend the patient continue trying some anti-inflammatory gel as well as therapy rest and ice progress back to activity as therapy allows.  Questions were answered.  Patient understood and tolerated the procedure well.      Homero Melo MD  8/25/2022        Large Joint Arthrocentesis: L greater trochanteric bursa  Date/Time: 8/25/2022 10:53 AM  Consent given by: patient  Site marked: site marked  Timeout: Immediately prior to procedure a time out was called to verify the correct patient, procedure, equipment, support staff and site/side marked as required   Supporting Documentation  Indications: pain   Procedure Details  Location: hip - L greater trochanteric bursa  Preparation: Patient was prepped and draped in the usual sterile fashion  Needle size: 22 G  Approach: posterior  Medications administered: 80 mg methylPREDNISolone acetate 80 MG/ML; 2 mL lidocaine PF 1% 1 %

## 2022-09-08 ENCOUNTER — PATIENT ROUNDING (BHMG ONLY) (OUTPATIENT)
Dept: CARDIOLOGY | Facility: CLINIC | Age: 77
End: 2022-09-08

## 2022-09-16 ENCOUNTER — LAB (OUTPATIENT)
Dept: LAB | Facility: HOSPITAL | Age: 77
End: 2022-09-16

## 2022-09-16 DIAGNOSIS — Z13.6 SCREENING FOR ISCHEMIC HEART DISEASE: ICD-10-CM

## 2022-09-16 DIAGNOSIS — Z01.810 PRE-OPERATIVE CARDIOVASCULAR EXAMINATION: ICD-10-CM

## 2022-09-16 LAB
ANION GAP SERPL CALCULATED.3IONS-SCNC: 9.6 MMOL/L (ref 5–15)
BASOPHILS # BLD AUTO: 0.05 10*3/MM3 (ref 0–0.2)
BASOPHILS NFR BLD AUTO: 0.6 % (ref 0–1.5)
BUN SERPL-MCNC: 19 MG/DL (ref 8–23)
BUN/CREAT SERPL: 22.1 (ref 7–25)
CALCIUM SPEC-SCNC: 10.1 MG/DL (ref 8.6–10.5)
CHLORIDE SERPL-SCNC: 105 MMOL/L (ref 98–107)
CO2 SERPL-SCNC: 26.4 MMOL/L (ref 22–29)
CREAT SERPL-MCNC: 0.86 MG/DL (ref 0.57–1)
DEPRECATED RDW RBC AUTO: 43.2 FL (ref 37–54)
EGFRCR SERPLBLD CKD-EPI 2021: 70.6 ML/MIN/1.73
EOSINOPHIL # BLD AUTO: 0.07 10*3/MM3 (ref 0–0.4)
EOSINOPHIL NFR BLD AUTO: 0.8 % (ref 0.3–6.2)
ERYTHROCYTE [DISTWIDTH] IN BLOOD BY AUTOMATED COUNT: 12.5 % (ref 12.3–15.4)
GLUCOSE SERPL-MCNC: 95 MG/DL (ref 65–99)
HCT VFR BLD AUTO: 46.7 % (ref 34–46.6)
HGB BLD-MCNC: 15.3 G/DL (ref 12–15.9)
IMM GRANULOCYTES # BLD AUTO: 0.03 10*3/MM3 (ref 0–0.05)
IMM GRANULOCYTES NFR BLD AUTO: 0.3 % (ref 0–0.5)
LYMPHOCYTES # BLD AUTO: 2.36 10*3/MM3 (ref 0.7–3.1)
LYMPHOCYTES NFR BLD AUTO: 27.1 % (ref 19.6–45.3)
MCH RBC QN AUTO: 31 PG (ref 26.6–33)
MCHC RBC AUTO-ENTMCNC: 32.8 G/DL (ref 31.5–35.7)
MCV RBC AUTO: 94.5 FL (ref 79–97)
MONOCYTES # BLD AUTO: 0.57 10*3/MM3 (ref 0.1–0.9)
MONOCYTES NFR BLD AUTO: 6.5 % (ref 5–12)
NEUTROPHILS NFR BLD AUTO: 5.64 10*3/MM3 (ref 1.7–7)
NEUTROPHILS NFR BLD AUTO: 64.7 % (ref 42.7–76)
NRBC BLD AUTO-RTO: 0 /100 WBC (ref 0–0.2)
PLATELET # BLD AUTO: 324 10*3/MM3 (ref 140–450)
PMV BLD AUTO: 8.7 FL (ref 6–12)
POTASSIUM SERPL-SCNC: 4.7 MMOL/L (ref 3.5–5.2)
RBC # BLD AUTO: 4.94 10*6/MM3 (ref 3.77–5.28)
SODIUM SERPL-SCNC: 141 MMOL/L (ref 136–145)
WBC NRBC COR # BLD: 8.72 10*3/MM3 (ref 3.4–10.8)

## 2022-09-16 PROCEDURE — 36415 COLL VENOUS BLD VENIPUNCTURE: CPT

## 2022-09-16 PROCEDURE — 85025 COMPLETE CBC W/AUTO DIFF WBC: CPT

## 2022-09-16 PROCEDURE — 80048 BASIC METABOLIC PNL TOTAL CA: CPT

## 2022-09-21 ENCOUNTER — ANESTHESIA (OUTPATIENT)
Dept: CARDIOLOGY | Facility: HOSPITAL | Age: 77
End: 2022-09-21

## 2022-09-21 ENCOUNTER — ANESTHESIA EVENT (OUTPATIENT)
Dept: CARDIOLOGY | Facility: HOSPITAL | Age: 77
End: 2022-09-21

## 2022-09-21 ENCOUNTER — HOSPITAL ENCOUNTER (OUTPATIENT)
Facility: HOSPITAL | Age: 77
Setting detail: HOSPITAL OUTPATIENT SURGERY
Discharge: HOME | End: 2022-09-21
Attending: INTERNAL MEDICINE | Admitting: INTERNAL MEDICINE

## 2022-09-21 VITALS
DIASTOLIC BLOOD PRESSURE: 73 MMHG | WEIGHT: 174 LBS | RESPIRATION RATE: 18 BRPM | TEMPERATURE: 97.9 F | HEART RATE: 78 BPM | BODY MASS INDEX: 32.02 KG/M2 | SYSTOLIC BLOOD PRESSURE: 123 MMHG | HEIGHT: 62 IN | OXYGEN SATURATION: 98 %

## 2022-09-21 DIAGNOSIS — I48.92 ATRIAL FLUTTER WITH CONTROLLED RESPONSE: ICD-10-CM

## 2022-09-21 LAB
QT INTERVAL: 390 MS
QT INTERVAL: 430 MS

## 2022-09-21 PROCEDURE — C1730 CATH, EP, 19 OR FEW ELECT: HCPCS | Performed by: INTERNAL MEDICINE

## 2022-09-21 PROCEDURE — 25010000002 ROPIVACAINE PER 1 MG: Performed by: ANESTHESIOLOGY

## 2022-09-21 PROCEDURE — 25010000002 PROPOFOL 10 MG/ML EMULSION: Performed by: ANESTHESIOLOGY

## 2022-09-21 PROCEDURE — C1732 CATH, EP, DIAG/ABL, 3D/VECT: HCPCS | Performed by: INTERNAL MEDICINE

## 2022-09-21 PROCEDURE — 93005 ELECTROCARDIOGRAM TRACING: CPT | Performed by: INTERNAL MEDICINE

## 2022-09-21 PROCEDURE — 25010000002 PHENYLEPHRINE 10 MG/ML SOLUTION: Performed by: ANESTHESIOLOGY

## 2022-09-21 PROCEDURE — C1731 CATH, EP, 20 OR MORE ELEC: HCPCS | Performed by: INTERNAL MEDICINE

## 2022-09-21 PROCEDURE — 93653 COMPRE EP EVAL TX SVT: CPT | Performed by: INTERNAL MEDICINE

## 2022-09-21 PROCEDURE — 93662 INTRACARDIAC ECG (ICE): CPT | Performed by: INTERNAL MEDICINE

## 2022-09-21 PROCEDURE — C1894 INTRO/SHEATH, NON-LASER: HCPCS | Performed by: INTERNAL MEDICINE

## 2022-09-21 PROCEDURE — C1759 CATH, INTRA ECHOCARDIOGRAPHY: HCPCS | Performed by: INTERNAL MEDICINE

## 2022-09-21 PROCEDURE — 25010000002 DEXAMETHASONE PER 1 MG: Performed by: ANESTHESIOLOGY

## 2022-09-21 PROCEDURE — 25010000002 NEOSTIGMINE 5 MG/10ML SOLUTION: Performed by: NURSE ANESTHETIST, CERTIFIED REGISTERED

## 2022-09-21 PROCEDURE — 93010 ELECTROCARDIOGRAM REPORT: CPT | Performed by: INTERNAL MEDICINE

## 2022-09-21 PROCEDURE — 25010000002 MIDAZOLAM PER 1 MG: Performed by: ANESTHESIOLOGY

## 2022-09-21 PROCEDURE — 0 LIDOCAINE 1 % SOLUTION: Performed by: INTERNAL MEDICINE

## 2022-09-21 PROCEDURE — 25010000002 ONDANSETRON PER 1 MG: Performed by: ANESTHESIOLOGY

## 2022-09-21 RX ORDER — HYDROMORPHONE HYDROCHLORIDE 1 MG/ML
0.5 INJECTION, SOLUTION INTRAMUSCULAR; INTRAVENOUS; SUBCUTANEOUS
Status: DISCONTINUED | OUTPATIENT
Start: 2022-09-21 | End: 2022-09-21 | Stop reason: HOSPADM

## 2022-09-21 RX ORDER — LIDOCAINE HYDROCHLORIDE 20 MG/ML
INJECTION, SOLUTION EPIDURAL; INFILTRATION; INTRACAUDAL; PERINEURAL AS NEEDED
Status: DISCONTINUED | OUTPATIENT
Start: 2022-09-21 | End: 2022-09-21 | Stop reason: SURG

## 2022-09-21 RX ORDER — LABETALOL HYDROCHLORIDE 5 MG/ML
5 INJECTION, SOLUTION INTRAVENOUS
Status: DISCONTINUED | OUTPATIENT
Start: 2022-09-21 | End: 2022-09-21 | Stop reason: HOSPADM

## 2022-09-21 RX ORDER — HYDROCODONE BITARTRATE AND ACETAMINOPHEN 7.5; 325 MG/1; MG/1
1 TABLET ORAL ONCE AS NEEDED
Status: DISCONTINUED | OUTPATIENT
Start: 2022-09-21 | End: 2022-09-21 | Stop reason: HOSPADM

## 2022-09-21 RX ORDER — PROPOFOL 10 MG/ML
VIAL (ML) INTRAVENOUS AS NEEDED
Status: DISCONTINUED | OUTPATIENT
Start: 2022-09-21 | End: 2022-09-21 | Stop reason: SURG

## 2022-09-21 RX ORDER — NALOXONE HCL 0.4 MG/ML
0.4 VIAL (ML) INJECTION
Status: DISCONTINUED | OUTPATIENT
Start: 2022-09-21 | End: 2022-09-21 | Stop reason: HOSPADM

## 2022-09-21 RX ORDER — ONDANSETRON 2 MG/ML
4 INJECTION INTRAMUSCULAR; INTRAVENOUS EVERY 6 HOURS PRN
Status: DISCONTINUED | OUTPATIENT
Start: 2022-09-21 | End: 2022-09-21 | Stop reason: HOSPADM

## 2022-09-21 RX ORDER — GLYCOPYRROLATE 0.2 MG/ML
INJECTION INTRAMUSCULAR; INTRAVENOUS AS NEEDED
Status: DISCONTINUED | OUTPATIENT
Start: 2022-09-21 | End: 2022-09-21 | Stop reason: SURG

## 2022-09-21 RX ORDER — DIPHENHYDRAMINE HYDROCHLORIDE 50 MG/ML
12.5 INJECTION INTRAMUSCULAR; INTRAVENOUS
Status: DISCONTINUED | OUTPATIENT
Start: 2022-09-21 | End: 2022-09-21 | Stop reason: HOSPADM

## 2022-09-21 RX ORDER — NALOXONE HCL 0.4 MG/ML
0.2 VIAL (ML) INJECTION AS NEEDED
Status: DISCONTINUED | OUTPATIENT
Start: 2022-09-21 | End: 2022-09-21 | Stop reason: HOSPADM

## 2022-09-21 RX ORDER — PROMETHAZINE HYDROCHLORIDE 12.5 MG/1
25 TABLET ORAL ONCE AS NEEDED
Status: DISCONTINUED | OUTPATIENT
Start: 2022-09-21 | End: 2022-09-21 | Stop reason: HOSPADM

## 2022-09-21 RX ORDER — DEXAMETHASONE SODIUM PHOSPHATE 4 MG/ML
INJECTION, SOLUTION INTRA-ARTICULAR; INTRALESIONAL; INTRAMUSCULAR; INTRAVENOUS; SOFT TISSUE AS NEEDED
Status: DISCONTINUED | OUTPATIENT
Start: 2022-09-21 | End: 2022-09-21 | Stop reason: SURG

## 2022-09-21 RX ORDER — SODIUM CHLORIDE 0.9 % (FLUSH) 0.9 %
3-10 SYRINGE (ML) INJECTION AS NEEDED
Status: DISCONTINUED | OUTPATIENT
Start: 2022-09-21 | End: 2022-09-21 | Stop reason: HOSPADM

## 2022-09-21 RX ORDER — FENTANYL CITRATE 50 UG/ML
50 INJECTION, SOLUTION INTRAMUSCULAR; INTRAVENOUS
Status: DISCONTINUED | OUTPATIENT
Start: 2022-09-21 | End: 2022-09-21 | Stop reason: HOSPADM

## 2022-09-21 RX ORDER — FAMOTIDINE 10 MG/ML
20 INJECTION, SOLUTION INTRAVENOUS ONCE
Status: COMPLETED | OUTPATIENT
Start: 2022-09-21 | End: 2022-09-21

## 2022-09-21 RX ORDER — SODIUM CHLORIDE 0.9 % (FLUSH) 0.9 %
3 SYRINGE (ML) INJECTION EVERY 12 HOURS SCHEDULED
Status: DISCONTINUED | OUTPATIENT
Start: 2022-09-21 | End: 2022-09-21 | Stop reason: HOSPADM

## 2022-09-21 RX ORDER — SODIUM CHLORIDE 0.9 % (FLUSH) 0.9 %
10 SYRINGE (ML) INJECTION EVERY 12 HOURS SCHEDULED
Status: DISCONTINUED | OUTPATIENT
Start: 2022-09-21 | End: 2022-09-21 | Stop reason: HOSPADM

## 2022-09-21 RX ORDER — PHENYLEPHRINE HYDROCHLORIDE 10 MG/ML
INJECTION INTRAVENOUS AS NEEDED
Status: DISCONTINUED | OUTPATIENT
Start: 2022-09-21 | End: 2022-09-21 | Stop reason: SURG

## 2022-09-21 RX ORDER — MIDAZOLAM HYDROCHLORIDE 1 MG/ML
0.5 INJECTION INTRAMUSCULAR; INTRAVENOUS
Status: DISCONTINUED | OUTPATIENT
Start: 2022-09-21 | End: 2022-09-21 | Stop reason: HOSPADM

## 2022-09-21 RX ORDER — SODIUM CHLORIDE 9 MG/ML
75 INJECTION, SOLUTION INTRAVENOUS CONTINUOUS
Status: DISCONTINUED | OUTPATIENT
Start: 2022-09-21 | End: 2022-09-21 | Stop reason: HOSPADM

## 2022-09-21 RX ORDER — DIPHENHYDRAMINE HCL 25 MG
25 CAPSULE ORAL
Status: DISCONTINUED | OUTPATIENT
Start: 2022-09-21 | End: 2022-09-21 | Stop reason: HOSPADM

## 2022-09-21 RX ORDER — ACETAMINOPHEN 500 MG
1000 TABLET ORAL ONCE
Status: DISCONTINUED | OUTPATIENT
Start: 2022-09-21 | End: 2022-09-21 | Stop reason: HOSPADM

## 2022-09-21 RX ORDER — ONDANSETRON 2 MG/ML
4 INJECTION INTRAMUSCULAR; INTRAVENOUS ONCE AS NEEDED
Status: DISCONTINUED | OUTPATIENT
Start: 2022-09-21 | End: 2022-09-21 | Stop reason: HOSPADM

## 2022-09-21 RX ORDER — LIDOCAINE HYDROCHLORIDE 10 MG/ML
INJECTION, SOLUTION INFILTRATION; PERINEURAL AS NEEDED
Status: DISCONTINUED | OUTPATIENT
Start: 2022-09-21 | End: 2022-09-21 | Stop reason: HOSPADM

## 2022-09-21 RX ORDER — ROPIVACAINE HYDROCHLORIDE 2 MG/ML
INJECTION, SOLUTION EPIDURAL; INFILTRATION; PERINEURAL AS NEEDED
Status: DISCONTINUED | OUTPATIENT
Start: 2022-09-21 | End: 2022-09-21 | Stop reason: SURG

## 2022-09-21 RX ORDER — EPHEDRINE SULFATE 50 MG/ML
5 INJECTION, SOLUTION INTRAVENOUS ONCE AS NEEDED
Status: DISCONTINUED | OUTPATIENT
Start: 2022-09-21 | End: 2022-09-21 | Stop reason: HOSPADM

## 2022-09-21 RX ORDER — HYDRALAZINE HYDROCHLORIDE 20 MG/ML
5 INJECTION INTRAMUSCULAR; INTRAVENOUS
Status: DISCONTINUED | OUTPATIENT
Start: 2022-09-21 | End: 2022-09-21 | Stop reason: HOSPADM

## 2022-09-21 RX ORDER — FLUMAZENIL 0.1 MG/ML
0.2 INJECTION INTRAVENOUS AS NEEDED
Status: DISCONTINUED | OUTPATIENT
Start: 2022-09-21 | End: 2022-09-21 | Stop reason: HOSPADM

## 2022-09-21 RX ORDER — ONDANSETRON 2 MG/ML
INJECTION INTRAMUSCULAR; INTRAVENOUS AS NEEDED
Status: DISCONTINUED | OUTPATIENT
Start: 2022-09-21 | End: 2022-09-21 | Stop reason: SURG

## 2022-09-21 RX ORDER — OXYCODONE AND ACETAMINOPHEN 7.5; 325 MG/1; MG/1
1 TABLET ORAL EVERY 4 HOURS PRN
Status: DISCONTINUED | OUTPATIENT
Start: 2022-09-21 | End: 2022-09-21 | Stop reason: HOSPADM

## 2022-09-21 RX ORDER — SODIUM CHLORIDE 0.9 % (FLUSH) 0.9 %
10 SYRINGE (ML) INJECTION AS NEEDED
Status: DISCONTINUED | OUTPATIENT
Start: 2022-09-21 | End: 2022-09-21 | Stop reason: HOSPADM

## 2022-09-21 RX ORDER — NEOSTIGMINE METHYLSULFATE 0.5 MG/ML
INJECTION, SOLUTION INTRAVENOUS AS NEEDED
Status: DISCONTINUED | OUTPATIENT
Start: 2022-09-21 | End: 2022-09-21 | Stop reason: SURG

## 2022-09-21 RX ORDER — SODIUM CHLORIDE, SODIUM LACTATE, POTASSIUM CHLORIDE, CALCIUM CHLORIDE 600; 310; 30; 20 MG/100ML; MG/100ML; MG/100ML; MG/100ML
9 INJECTION, SOLUTION INTRAVENOUS CONTINUOUS
Status: DISCONTINUED | OUTPATIENT
Start: 2022-09-21 | End: 2022-09-21 | Stop reason: HOSPADM

## 2022-09-21 RX ORDER — PROMETHAZINE HYDROCHLORIDE 25 MG/1
25 SUPPOSITORY RECTAL ONCE AS NEEDED
Status: DISCONTINUED | OUTPATIENT
Start: 2022-09-21 | End: 2022-09-21 | Stop reason: HOSPADM

## 2022-09-21 RX ORDER — EPHEDRINE SULFATE 50 MG/ML
INJECTION, SOLUTION INTRAVENOUS AS NEEDED
Status: DISCONTINUED | OUTPATIENT
Start: 2022-09-21 | End: 2022-09-21 | Stop reason: SURG

## 2022-09-21 RX ADMIN — PROPOFOL 180 MG: 10 INJECTION, EMULSION INTRAVENOUS at 13:03

## 2022-09-21 RX ADMIN — MIDAZOLAM HYDROCHLORIDE 0.5 MG: 1 INJECTION, SOLUTION INTRAMUSCULAR; INTRAVENOUS at 12:35

## 2022-09-21 RX ADMIN — EPHEDRINE SULFATE 10 MG: 50 INJECTION INTRAMUSCULAR; INTRAVENOUS; SUBCUTANEOUS at 13:50

## 2022-09-21 RX ADMIN — FAMOTIDINE 20 MG: 10 INJECTION, SOLUTION INTRAVENOUS at 10:16

## 2022-09-21 RX ADMIN — NEOSTIGMINE METHYLSULFATE 3 MG: 0.5 INJECTION INTRAVENOUS at 14:00

## 2022-09-21 RX ADMIN — SODIUM CHLORIDE 75 ML/HR: 9 INJECTION, SOLUTION INTRAVENOUS at 09:40

## 2022-09-21 RX ADMIN — PHENYLEPHRINE HYDROCHLORIDE 100 MCG: 10 INJECTION, SOLUTION INTRAVENOUS at 13:13

## 2022-09-21 RX ADMIN — ROPIVACAINE HYDROCHLORIDE 50 MG: 2 INJECTION, SOLUTION EPIDURAL; INFILTRATION at 13:03

## 2022-09-21 RX ADMIN — GLYCOPYRROLATE 0.4 MG: 0.2 INJECTION INTRAMUSCULAR; INTRAVENOUS at 14:00

## 2022-09-21 RX ADMIN — DEXAMETHASONE SODIUM PHOSPHATE 8 MG: 4 INJECTION, SOLUTION INTRA-ARTICULAR; INTRALESIONAL; INTRAMUSCULAR; INTRAVENOUS; SOFT TISSUE at 13:14

## 2022-09-21 RX ADMIN — ONDANSETRON 4 MG: 2 INJECTION INTRAMUSCULAR; INTRAVENOUS at 13:19

## 2022-09-21 RX ADMIN — LIDOCAINE HYDROCHLORIDE 60 MG: 20 INJECTION, SOLUTION EPIDURAL; INFILTRATION; INTRACAUDAL; PERINEURAL at 13:03

## 2022-09-21 NOTE — ANESTHESIA PREPROCEDURE EVALUATION
Anesthesia Evaluation     no history of anesthetic complications:  NPO Solid Status: > 8 hours  NPO Liquid Status: > 2 hours           Airway   Mallampati: II  Neck ROM: full  no difficulty expected  Dental - normal exam     Pulmonary - normal exam   (+) a smoker Former, shortness of breath,   (-) COPD, asthma, sleep apnea    ROS comment: Pulmonary nodules  PE comment: nonlabored  Cardiovascular - normal exam  Exercise tolerance: good (4-7 METS)    Rhythm: regular  Rate: normal    (+) hypertension, dysrhythmias Atrial Flutter, LÓPEZ,   (-) valvular problems/murmurs, past MI, CAD, angina      Neuro/Psych- negative ROS  (-) seizures, TIA, CVA  GI/Hepatic/Renal/Endo    (+) obesity,     (-) GERD, liver disease, no renal disease, diabetes, no thyroid disorder    Musculoskeletal     (+) arthralgias,   Abdominal    Substance History      OB/GYN          Other   arthritis,                      Anesthesia Plan    ASA 3     general     intravenous induction     Anesthetic plan, risks, benefits, and alternatives have been provided, discussed and informed consent has been obtained with: patient.        CODE STATUS:

## 2022-09-21 NOTE — ANESTHESIA POSTPROCEDURE EVALUATION
Patient: Minnie Ross    Procedure Summary     Date: 09/21/22 Room / Location: [facility]  /  [facility]    Anesthesia Start: 1254 Anesthesia Stop: 1412    Procedures:       Ablation atrial flutter (N/A )      3D MAPPING CARTO EP (N/A ) Diagnosis:       Atrial flutter with controlled response (HCC)      (atrial flutter)    Providers: Daniel Dos Santos MD Provider: Heide Urena MD    Anesthesia Type: general ASA Status: 3          Anesthesia Type: general    Vitals  Vitals Value Taken Time   /72 09/21/22 1657   Temp 36.6 °C (97.9 °F) 09/21/22 1416   Pulse 89 09/21/22 1707   Resp 16 09/21/22 1630   SpO2 97 % 09/21/22 1701   Vitals shown include unvalidated device data.        Post Anesthesia Care and Evaluation    Patient location during evaluation: bedside  Patient participation: complete - patient participated  Level of consciousness: awake  Pain management: adequate    Airway patency: patent  Anesthetic complications: No anesthetic complications  PONV Status: none  Cardiovascular status: acceptable  Respiratory status: acceptable  Hydration status: acceptable  Post Neuraxial Block status: Motor and sensory function returned to baseline

## 2022-09-21 NOTE — DISCHARGE INSTRUCTIONS
[facility]  Cardiology  [address] [place] [address]  [phone]    Coronary Ablation After Care    Refer to this sheet in the next few weeks. These instructions provide you with information on caring for yourself after your procedure. Your health care provider may also give you more specific instructions. Your treatment has been planned according to current medical practices, but problems sometimes occur. Call your health care provider if you have any problems or questions after your procedure.      What to Expect After the Procedure:  After your procedure, it is typical to have the following sensations:  Minor discomfort or tenderness and a small bump at the catheter insertion site(s). The bump(s) should usually decrease in size and tenderness within 1 to 2 weeks.  Any bruising will usually fade within 2 to 4 weeks.  Home Care Instructions:  Do not apply powder or lotion to the site.  Do not take baths, swim, or use a hot tub until your health care provider approves and the site is completely healed.  Do not bend, squat, or lift anything over 20 lb (9 kg) or as directed by your health care provider. However, we recommend lifting nothing heavier than a gallon of milk.    You may shower 24 hours after the procedure. Remove the bandage (dressing) and gently wash the site with plain soap and water. Gently pat the site dry. You may apply a band aid daily for 2 days if desired.    Inspect the site at least twice daily.  Increase your fluid intake for the next 2 days.    Limit your activity for the first 48 hours.   Avoid strenuous activity for 1 week or as advised by your physician.    Follow instructions about when you can drive or return to work as directed by your physician.    Hold direct pressure over the site when you cough, sneeze, laugh or change positions.  Do this for the next 2 days.    Call Your Doctor If:  You have drainage (other than a small amount of blood on the dressing).  You  have chills or a fever > 101.  You have redness, warmth, swelling (larger than a walnut), or pain at the insertion   You develop palpitations, chest pain or shortness of breath, feel faint, or pass out.  You develop pain, discoloration, coldness, numbness, tingling, or severe bruising in the leg that held the catheter.  You develop bleeding from any other place, such as the bowels.  You have heavy bleeding from the site.  If this happens, hold pressure on the site and call 911.        Make Sure You:  Understand these instructions.  Will watch your condition.  Will get help right away if you are not doing well or get worse.

## 2022-09-21 NOTE — ANESTHESIA PROCEDURE NOTES
Airway  Urgency: elective    Date/Time: 9/21/2022 1:11 PM  Airway not difficult    General Information and Staff    Patient location during procedure: OR  Anesthesiologist: Heide Urena MD    Indications and Patient Condition  Indications for airway management: airway protection    Preoxygenated: yes  Mask difficulty assessment: 1 - vent by mask    Final Airway Details  Final airway type: endotracheal airway      Successful airway: ETT  Cuffed: yes   Successful intubation technique: direct laryngoscopy  Facilitating devices/methods: intubating stylet  Endotracheal tube insertion site: oral  Blade: Prerna  Blade size: 3  ETT size (mm): 7.5  Cormack-Lehane Classification: grade I - full view of glottis  Placement verified by: chest auscultation and capnometry   Number of attempts at approach: 1  Assessment: lips, teeth, and gum same as pre-op and atraumatic intubation

## 2022-10-25 ENCOUNTER — OFFICE VISIT (OUTPATIENT)
Dept: CARDIOLOGY | Facility: CLINIC | Age: 77
End: 2022-10-25

## 2022-10-25 VITALS
BODY MASS INDEX: 31.54 KG/M2 | SYSTOLIC BLOOD PRESSURE: 152 MMHG | HEART RATE: 55 BPM | DIASTOLIC BLOOD PRESSURE: 88 MMHG | WEIGHT: 178 LBS | HEIGHT: 63 IN

## 2022-10-25 DIAGNOSIS — I10 PRIMARY HYPERTENSION: ICD-10-CM

## 2022-10-25 DIAGNOSIS — I48.92 ATRIAL FLUTTER, UNSPECIFIED TYPE: Primary | ICD-10-CM

## 2022-10-25 PROCEDURE — 99214 OFFICE O/P EST MOD 30 MIN: CPT

## 2022-10-25 PROCEDURE — 93000 ELECTROCARDIOGRAM COMPLETE: CPT

## 2022-10-25 RX ORDER — AMLODIPINE BESYLATE 5 MG/1
5 TABLET ORAL DAILY
Qty: 90 TABLET | Refills: 1
Start: 2022-10-25

## 2022-10-25 NOTE — PROGRESS NOTES
Date of Office Visit: 10/25/2022  Encounter Provider: GABRIELE Caban  Place of Service: [facility] CARDIOLOGY  Patient Name: Minnie Ross  :1945    Chief Complaint   Patient presents with   • Atrial Flutter     1 month [facility] f/u   • s/p ablation    :     HPI: Minnie Ross is a 77 y.o. female who is followed by Dr. Holt, referred to Dr. Dos Santos for atrial flutter---s/p typical flutter ablation (2022). She also has history of HTN and PACs.     She was found to have atrial flutter during PCP routine exam in July.  She was noted to have elevated HR but no other symptoms.  She was sent to the ED and started on apixaban, metoprolol, and diltiazem.      She saw Dr. Dos Santos on 2022. Despite the two AV laurie blockers, her HR proved difficult to control.  She was asymptomatic but due to likely progression to atrial fibrillation, it was recommended that she undergo flutter ablation.     She underwent ablation of a typical flutter on 2022. She tolerated the procedure well and was advised to follow up in one month.                       She presents today for follow up appt.     She is doing very well.  She was never symptomatic with her AF so she says that she cannot tell a big difference.      No clinical recurrence of AF since procedure.     She may notice a little difference in her activity tolerance. Less fatigue.     Denies any chest pain, dyspnea, palpitations, or fatigue.     BP is elevated in office today. Currently on metoprolol and diltaizem.     EKG today shows NSR.      On apixaban for AC. No bleeding issues.          Past Medical History:   Diagnosis Date   • Abnormal Pap smear of cervix    • Arrhythmia    • Arthritis    • Atrial flutter (HCC)    • Cataract    • H/O complete eye exam 2018   • History of blood transfusion    • History of bone density study 2018   • Hypertension    • IBS (irritable bowel syndrome)    • Knee swelling    •  Periarthritis of shoulder        Past Surgical History:   Procedure Laterality Date   • APPENDECTOMY  1960   • CARDIAC ELECTROPHYSIOLOGY PROCEDURE N/A 9/21/2022    Procedure: Ablation atrial flutter;  Surgeon: Daniel Dos Santos MD;  Location: [facility] INVASIVE LOCATION;  Service: Cardiovascular;  Laterality: N/A;   • CATARACT EXTRACTION  2019   • CHOLECYSTECTOMY  1975   • KNEE SURGERY Right 2008    Homero Gaston MD   • MAMMO BILATERAL  2018   • MOUTH SURGERY  2013   • PAP SMEAR  12/29/2016   • PARTIAL KNEE ARTHROPLASTY  2010       Social History     Socioeconomic History   • Marital status:      Spouse name: Julián   • Number of children: 1   Tobacco Use   • Smoking status: Former     Packs/day: 1.00     Years: 25.00     Pack years: 25.00     Types: Cigarettes     Quit date: 1/1/2007     Years since quitting: 15.8   • Smokeless tobacco: Never   Vaping Use   • Vaping Use: Never used   Substance and Sexual Activity   • Alcohol use: Yes     Alcohol/week: 4.0 standard drinks     Types: 4 Glasses of wine per week     Comment: 3-4 week   • Drug use: No   • Sexual activity: Not Currently     Partners: Male     Birth control/protection: Post-menopausal       Family History   Problem Relation Age of Onset   • Hypertension Mother    • Heart disease Mother    • Arthritis Mother    • Deep vein thrombosis Brother    • Hyperlipidemia Sister        Review of Systems   Constitutional: Negative for chills, fever and malaise/fatigue.   Cardiovascular: Negative for chest pain, dyspnea on exertion, leg swelling, near-syncope, orthopnea, palpitations, paroxysmal nocturnal dyspnea and syncope.   Respiratory: Negative for cough and shortness of breath.    Hematologic/Lymphatic: Negative.    Musculoskeletal: Negative for joint pain, joint swelling and myalgias.   Gastrointestinal: Negative for abdominal pain, diarrhea, melena, nausea and vomiting.   Genitourinary: Negative for frequency and hematuria.   Neurological: Negative  "for light-headedness, numbness, paresthesias and seizures.   Allergic/Immunologic: Negative.    All other systems reviewed and are negative.      No Known Allergies      Current Outpatient Medications:   •  apixaban (ELIQUIS) 5 MG tablet tablet, Take 1 tablet by mouth Every 12 (Twelve) Hours., Disp: 42 tablet, Rfl: 0  •  Calcium-Magnesium-Vitamin D (CITRACAL CALCIUM+D PO), , Disp: , Rfl:   •  dilTIAZem CD (CARDIZEM CD) 180 MG 24 hr capsule, Take 1 capsule by mouth Daily., Disp: 30 capsule, Rfl: 11  •  metoprolol succinate XL (TOPROL-XL) 25 MG 24 hr tablet, Take 1 tablet by mouth Daily., Disp: 30 tablet, Rfl: 11  •  Multiple Vitamins-Minerals (ICAPS AREDS 2 PO), Take 1 capsule by mouth 2 (Two) Times a Day., Disp: , Rfl:   •  TURMERIC PO, Take  by mouth., Disp: , Rfl:   •  Zinc Sulfate (ZINC 15 PO), Take  by mouth., Disp: , Rfl:       Objective:     Vitals:    10/25/22 0858   BP: 152/88   Pulse: 55   Weight: 80.7 kg (178 lb)   Height: 160 cm (63\")     Body mass index is 31.53 kg/m².    PHYSICAL EXAM:    Vitals Reviewed.   General Appearance: No acute distress, well developed and well nourished.   Eyes: Conjunctiva and lids: No erythema, swelling, or discharge. Sclera non-icteric.   HENT: Atraumatic, normocephalic. External eyes, ears, and nose normal.   Respiratory: No signs of respiratory distress. Respiration rhythm and depth normal.   Clear to auscultation. No rales, crackles, rhonchi, or wheezing auscultated.   Cardiovascular:  Heart Rate and Rhythm: Normal, Heart Sounds: Normal S1 and S2. No S3 or S4 noted.  Gastrointestinal:  Abdomen soft, non-distended, non-tender.   Musculoskeletal: Normal movement of extremities  Skin: Warm and dry.   Psychiatric: Patient alert and oriented to person, place, and time. Speech and behavior appropriate. Normal mood and affect.       ECG 12 Lead    Date/Time: 10/25/2022 9:26 AM  Performed by: Liang Henson APRN  Authorized by: Liang Henson APRN   Comparison: compared with " previous ECG   Similar to previous ECG  Rhythm: sinus bradycardia  BPM: 55                Assessment:       Diagnosis Plan   1. Atrial flutter, unspecified type (HCC)        2. Primary hypertension               Plan:   1. Typical atrial flutter---s/p CTI ablation (9/12/2022)--- she has done well since ablation. No clinical recurrence of AF. She has never been symptomatic.  EKG today shows NSR. Will stop diltiazem and continue metoprolol. Continue apixaban for AC.       2. HTN--elevated today. I will stop diltazem and restart amlodipine. She was on this before and tolerated well.       She will follow up in 3 months or sooner if she has issues. Would consider monitor at that visit, due to lack of symptoms in the past.     As always, it has been a pleasure to participate in your patient's care.      Sincerely,         GABRIELE Caban

## 2022-11-03 ENCOUNTER — HOSPITAL ENCOUNTER (OUTPATIENT)
Dept: BONE DENSITY | Facility: HOSPITAL | Age: 77
Discharge: HOME | End: 2022-11-03
Admitting: FAMILY MEDICINE

## 2022-11-03 DIAGNOSIS — Z78.0 POSTMENOPAUSAL: ICD-10-CM

## 2022-11-03 PROCEDURE — 77080 DXA BONE DENSITY AXIAL: CPT

## 2022-12-07 VITALS — BODY MASS INDEX: 31.54 KG/M2 | HEIGHT: 63 IN | WEIGHT: 178 LBS

## 2022-12-07 NOTE — PROGRESS NOTES
Subjective   Minnie Ross is a 77 y.o. female.     CC: VV for COVID    History of Present Illness     Pt seen today on a VV after testing positive for COVID   Pt reports very ST, low grade fever, some coughing, some myalgias. Started about 2-3 days ago.     Pt is double COVID shot recipient.     The following portions of the patient's history were reviewed and updated as appropriate: allergies, current medications, past family history, past medical history, past social history, past surgical history and problem list.    Review of Systems   Constitutional: Positive for chills, fatigue and fever. Negative for activity change.   HENT: Positive for congestion and sore throat.    Respiratory: Positive for cough.    Cardiovascular: Negative for chest pain.   Musculoskeletal: Positive for myalgias.   Psychiatric/Behavioral: Negative for dysphoric mood.       Objective   Physical Exam  Constitutional:       General: She is not in acute distress.     Appearance: She is well-developed.   Pulmonary:      Effort: Pulmonary effort is normal.   Neurological:      Mental Status: She is alert and oriented to person, place, and time.   Psychiatric:         Behavior: Behavior normal.         Thought Content: Thought content normal.         Assessment & Plan   Diagnoses and all orders for this visit:    1. Upper respiratory tract infection due to COVID-19 virus (Primary)  Comments:  with systemic symptoms  Orders:  -     azithromycin (Zithromax Z-Gadiel) 250 MG tablet; Take 2 tablets the first day, then 1 tablet daily for 4 days.  Dispense: 6 tablet; Refill: 0  -     montelukast (Singulair) 10 MG tablet; Take 1 tablet by mouth Every Night for 14 days.  Dispense: 14 tablet; Refill: 0    Script to CC at 30mg QD x 7 days, along with Quercetin BID and the vitamins discussed.       Spent  14   minutes with chart and interview and consent for this encounter given by the patient.  You have chosen to receive care through a telehealth visit.   Do you consent to use a video/audio connection for your medical care today? Yes  Patient was in their residence when this visit took place and I was in my medical office.

## 2022-12-08 ENCOUNTER — TELEMEDICINE (OUTPATIENT)
Dept: FAMILY MEDICINE CLINIC | Facility: CLINIC | Age: 77
End: 2022-12-08

## 2022-12-08 DIAGNOSIS — J06.9 UPPER RESPIRATORY TRACT INFECTION DUE TO COVID-19 VIRUS: Primary | ICD-10-CM

## 2022-12-08 DIAGNOSIS — U07.1 UPPER RESPIRATORY TRACT INFECTION DUE TO COVID-19 VIRUS: Primary | ICD-10-CM

## 2022-12-08 PROCEDURE — 99214 OFFICE O/P EST MOD 30 MIN: CPT | Performed by: FAMILY MEDICINE

## 2022-12-08 RX ORDER — AZITHROMYCIN 250 MG/1
TABLET, FILM COATED ORAL
Qty: 6 TABLET | Refills: 0 | Status: SHIPPED | OUTPATIENT
Start: 2022-12-08 | End: 2023-01-26

## 2022-12-08 RX ORDER — MONTELUKAST SODIUM 10 MG/1
10 TABLET ORAL NIGHTLY
Qty: 14 TABLET | Refills: 0 | Status: SHIPPED | OUTPATIENT
Start: 2022-12-08 | End: 2023-01-26

## 2022-12-08 RX ORDER — MULTIVIT WITH MINERALS/LUTEIN
TABLET ORAL
COMMUNITY

## 2022-12-11 ENCOUNTER — PATIENT MESSAGE (OUTPATIENT)
Dept: FAMILY MEDICINE CLINIC | Facility: CLINIC | Age: 77
End: 2022-12-11

## 2023-01-26 ENCOUNTER — OFFICE VISIT (OUTPATIENT)
Dept: CARDIOLOGY | Facility: CLINIC | Age: 78
End: 2023-01-26
Payer: MEDICARE

## 2023-01-26 VITALS
DIASTOLIC BLOOD PRESSURE: 86 MMHG | HEIGHT: 63 IN | SYSTOLIC BLOOD PRESSURE: 144 MMHG | WEIGHT: 169 LBS | HEART RATE: 58 BPM | BODY MASS INDEX: 29.95 KG/M2

## 2023-01-26 DIAGNOSIS — I48.92 ATRIAL FLUTTER, UNSPECIFIED TYPE: Primary | ICD-10-CM

## 2023-01-26 DIAGNOSIS — I10 PRIMARY HYPERTENSION: ICD-10-CM

## 2023-01-26 PROCEDURE — 93000 ELECTROCARDIOGRAM COMPLETE: CPT

## 2023-01-26 PROCEDURE — 99213 OFFICE O/P EST LOW 20 MIN: CPT

## 2023-01-26 NOTE — PROGRESS NOTES
Date of Office Visit: 2023  Encounter Provider: GABRIELE Caban  Place of Service: [facility] CARDIOLOGY  Patient Name: Minnie Ross  :1945    Chief Complaint   Patient presents with   • typical atrial flutter     3 month f/u    :     HPI: Minnie Ross is a 77 y.o. female who is followed by Dr. Holt, referred to Dr. Dos Santos for atrial flutter---s/p typical flutter ablation (2022). She also has history of HTN and PACs.      She was found to have atrial flutter during PCP routine exam in July.  She was noted to have elevated HR but no other symptoms.  She was sent to the ED and started on apixaban, metoprolol, and diltiazem.       She saw Dr. Dos Santos on 2022. Despite the two AV laurie blockers, her HR proved difficult to control.  She was asymptomatic but due to likely progression to atrial fibrillation, it was recommended that she undergo flutter ablation.      She underwent ablation of a typical flutter on 2022. She tolerated the procedure well and was advised to follow up in one month.     I saw her at one month, she was doing well. Had no recurrent AF but was never really symptomatic. Stopped diltaizem due to bradycardia and started her back on amlodipine.                                      She presents today for follow up appt.     She continues to do well and maintaining NSR.      Not on any AAD.     There has been no clinical recurrence of AF. EKG today shows NSR.     She does feel like her energy level is not what is used to be.     Maybe a little less short of breath with activity since the ablation.     She is currently on apixaban but is inquiring about once a day dosing. Her [id number] is 4.     Her BP is slightly elevated in office today.     She was on a combo pill previously with benezapril and amlodipine.       CHADS-VASc Risk Assessment            4 Total Score    1 Hypertension    2 Age >/= 75    1 Sex: Female        Criteria that do  not apply:    CHF    DM    PRIOR STROKE/TIA/THROMBO    Vascular Disease    Age 65-74                Past Medical History:   Diagnosis Date   • Abnormal Pap smear of cervix    • Arrhythmia    • Arthritis    • Atrial flutter (HCC)    • Cataract    • H/O complete eye exam 2018   • History of blood transfusion    • History of bone density study 2018   • Hypertension    • IBS (irritable bowel syndrome)    • Knee swelling    • Periarthritis of shoulder        Past Surgical History:   Procedure Laterality Date   • APPENDECTOMY     • CARDIAC ELECTROPHYSIOLOGY PROCEDURE N/A 2022    Procedure: Ablation atrial flutter;  Surgeon: Daniel Dos Santos MD;  Location: [facility] INVASIVE LOCATION;  Service: Cardiovascular;  Laterality: N/A;   • CATARACT EXTRACTION     • CHOLECYSTECTOMY     • KNEE SURGERY Right     Homero Gaston MD   • MAMMO BILATERAL     • MOUTH SURGERY     • PAP SMEAR  2016   • PARTIAL KNEE ARTHROPLASTY         Social History     Socioeconomic History   • Marital status:      Spouse name: Julián   • Number of children: 1   Tobacco Use   • Smoking status: Former     Packs/day: 1.00     Years: 25.00     Pack years: 25.00     Types: Cigarettes     Quit date: 2007     Years since quittin.0   • Smokeless tobacco: Never   • Tobacco comments:     Caffeine - approximately 2 diet cokes a day   Vaping Use   • Vaping Use: Never used   Substance and Sexual Activity   • Alcohol use: Yes     Alcohol/week: 4.0 standard drinks     Types: 4 Glasses of wine per week     Comment: 3-4 week   • Drug use: No   • Sexual activity: Not Currently     Partners: Male     Birth control/protection: Post-menopausal       Family History   Problem Relation Age of Onset   • Hypertension Mother    • Heart disease Mother    • Arthritis Mother    • Deep vein thrombosis Brother    • Hyperlipidemia Sister        Review of Systems   Constitutional: Positive for malaise/fatigue. Negative for  "chills and fever.   Cardiovascular: Negative for chest pain, dyspnea on exertion, leg swelling, near-syncope, orthopnea, palpitations, paroxysmal nocturnal dyspnea and syncope.   Respiratory: Negative for cough and shortness of breath.    Hematologic/Lymphatic: Negative.    Musculoskeletal: Negative for joint pain, joint swelling and myalgias.   Gastrointestinal: Negative for abdominal pain, diarrhea, melena, nausea and vomiting.   Genitourinary: Negative for frequency and hematuria.   Neurological: Negative for light-headedness, numbness, paresthesias and seizures.   Allergic/Immunologic: Negative.    All other systems reviewed and are negative.      No Known Allergies      Current Outpatient Medications:   •  amLODIPine (NORVASC) 5 MG tablet, Take 1 tablet by mouth Daily., Disp: 90 tablet, Rfl: 1  •  apixaban (ELIQUIS) 5 MG tablet tablet, Take 1 tablet by mouth Every 12 (Twelve) Hours., Disp: 42 tablet, Rfl: 0  •  ascorbic acid (VITAMIN C) 1000 MG tablet, , Disp: , Rfl:   •  Calcium-Magnesium-Vitamin D (CITRACAL CALCIUM+D PO), , Disp: , Rfl:   •  metoprolol succinate XL (TOPROL-XL) 25 MG 24 hr tablet, Take 1 tablet by mouth Daily., Disp: 30 tablet, Rfl: 11  •  Multiple Vitamins-Minerals (ICAPS AREDS 2 PO), Take 1 capsule by mouth 2 (Two) Times a Day., Disp: , Rfl:   •  TURMERIC PO, Take  by mouth., Disp: , Rfl:   •  Zinc Sulfate (ZINC 15 PO), Take  by mouth., Disp: , Rfl:       Objective:     Vitals:    01/26/23 0849   BP: 144/86   Pulse: 58   Weight: 76.7 kg (169 lb)   Height: 160 cm (63\")     Body mass index is 29.94 kg/m².    PHYSICAL EXAM:    Vitals Reviewed.   General Appearance: No acute distress, well developed and well nourished.   Eyes: Conjunctiva and lids: No erythema, swelling, or discharge. Sclera non-icteric.   HENT: Atraumatic, normocephalic. External eyes, ears, and nose normal.   Respiratory: No signs of respiratory distress. Respiration rhythm and depth normal.   Clear to auscultation. No rales, " crackles, rhonchi, or wheezing auscultated.   Cardiovascular:  Heart Rate and Rhythm: Normal, Heart Sounds: Normal S1 and S2. No S3 or S4 noted.  Gastrointestinal:  Abdomen soft, non-distended, non-tender.   Musculoskeletal: Normal movement of extremities  Skin: Warm and dry.   Psychiatric: Patient alert and oriented to person, place, and time. Speech and behavior appropriate. Normal mood and affect.       ECG 12 Lead    Date/Time: 1/26/2023 9:22 AM  Performed by: Liang Henson APRN  Authorized by: Liang Henson APRN   Comparison: compared with previous ECG   Similar to previous ECG  Rhythm: sinus bradycardia  BPM: 57                Assessment:       Diagnosis Plan   1. Atrial flutter, unspecified type (HCC)        2. Primary hypertension               Plan:   1. Atrial flutter---s/p CTI (9/21/2022)--- she is maintaining NSR post ablation.  She has not had any clinical recurrence of AF. Currently in NSR today. On apixaban for AC. We discussed AC today, her [id number] is 4. After shared decision making we will continue, but she is going to look at switching to R-ban due to once a day dosing and similar efficacy.         2. HTN--- slightly elevated in office today. She has not been checking it at home. I advised her to check and report back with goal of 130/80. I put her back on amlodipine but may need to consider addition of combo pill as she was previously on.          She will follow up in 6 months or sooner if she has issues.           As always, it has been a pleasure to participate in your patient's care.      Sincerely,         GABRIELE Caban

## 2023-02-10 ENCOUNTER — APPOINTMENT (OUTPATIENT)
Dept: WOMENS IMAGING | Facility: HOSPITAL | Age: 78
End: 2023-02-10
Payer: MEDICARE

## 2023-02-10 PROCEDURE — 77067 SCR MAMMO BI INCL CAD: CPT | Performed by: RADIOLOGY

## 2023-02-10 PROCEDURE — 77063 BREAST TOMOSYNTHESIS BI: CPT | Performed by: RADIOLOGY

## 2023-03-23 ENCOUNTER — OFFICE VISIT (OUTPATIENT)
Dept: FAMILY MEDICINE CLINIC | Facility: CLINIC | Age: 78
End: 2023-03-23
Payer: MEDICARE

## 2023-03-23 VITALS
WEIGHT: 170.2 LBS | SYSTOLIC BLOOD PRESSURE: 130 MMHG | HEIGHT: 63 IN | OXYGEN SATURATION: 96 % | DIASTOLIC BLOOD PRESSURE: 79 MMHG | TEMPERATURE: 98.6 F | RESPIRATION RATE: 16 BRPM | HEART RATE: 70 BPM | BODY MASS INDEX: 30.16 KG/M2

## 2023-03-23 DIAGNOSIS — J01.00 ACUTE NON-RECURRENT MAXILLARY SINUSITIS: Primary | ICD-10-CM

## 2023-03-23 DIAGNOSIS — R09.81 COUGH WITH CONGESTION OF PARANASAL SINUS: ICD-10-CM

## 2023-03-23 DIAGNOSIS — R05.8 COUGH WITH CONGESTION OF PARANASAL SINUS: ICD-10-CM

## 2023-03-23 RX ORDER — AMOXICILLIN 875 MG/1
875 TABLET, COATED ORAL EVERY 12 HOURS SCHEDULED
Qty: 20 TABLET | Refills: 0 | Status: SHIPPED | OUTPATIENT
Start: 2023-03-23

## 2023-03-23 NOTE — PROGRESS NOTES
Chief Complaint  Sore Throat and Earache    Betty Hartman presents to [facility]     As a 77 year old female C/o a 3-4 day history of sore throat,  Congestion, sinus pain and pressure, and ear pain/pressure.  She has a mild non productive cough, no sob or wheezing.  No fever, no abdominal pain, no N/V/D.  She is able to eat and keep down fluids.  She did take 2 amoxicillin that she had at home from a dental procedure yesterday and reports improved symptoms today  No other treatment to date or otc medications.  No known sick contacts    The following portions of the patient's history were reviewed and updated as appropriate: allergies, current medications, past family history, past medical history, past social history, past surgical history, and problem list  Answers for HPI/ROS submitted by the patient on 3/22/2023  What is the primary reason for your visit?: Sore Throat  Onset: today  Progression since onset: gradually worsening  Pain worse on: left  Pain - numeric: 8/10  plugged ear sensation: Yes  strep: No  mono: No      Review of Systems   Constitutional: Negative for chills, fatigue and fever.   HENT: Positive for ear pain, postnasal drip, sinus pressure, sore throat and trouble swallowing. Negative for congestion, drooling and ear discharge.    Eyes: Negative for visual disturbance.   Respiratory: Positive for cough. Negative for shortness of breath and wheezing.    Cardiovascular: Negative for chest pain, palpitations and leg swelling.   Gastrointestinal: Negative for abdominal pain, diarrhea, nausea and vomiting.   Musculoskeletal: Positive for neck pain.   Neurological: Negative for dizziness and light-headedness.        Objective   Vital Signs:   Vitals:    03/23/23 1018   BP: 130/79   BP Location: Left arm   Patient Position: Sitting   Pulse: 70   Resp: 16   Temp: 98.6 °F (37 °C)   TempSrc: Oral   SpO2: 96%   Weight: 77.2 kg (170 lb 3.2 oz)   Height: 160 cm  "(63\")                Physical Exam  Vitals reviewed.   Constitutional:       General: She is not in acute distress.  HENT:      Right Ear: Hearing, ear canal and external ear normal. A middle ear effusion is present. Tympanic membrane is erythematous. Tympanic membrane is not bulging.      Left Ear: Hearing, ear canal and external ear normal. A middle ear effusion is present. Tympanic membrane is erythematous. Tympanic membrane is not bulging.      Nose: Congestion present.      Right Turbinates: Swollen.      Left Turbinates: Swollen.      Right Sinus: Maxillary sinus tenderness present.      Left Sinus: Maxillary sinus tenderness present.      Mouth/Throat:      Pharynx: No pharyngeal swelling, oropharyngeal exudate, posterior oropharyngeal erythema or uvula swelling.      Tonsils: No tonsillar exudate or tonsillar abscesses.   Eyes:      Conjunctiva/sclera: Conjunctivae normal.   Neck:      Thyroid: No thyromegaly.      Vascular: No carotid bruit.   Cardiovascular:      Rate and Rhythm: Normal rate and regular rhythm.      Heart sounds: Normal heart sounds.   Pulmonary:      Effort: Pulmonary effort is normal. No respiratory distress.      Breath sounds: Normal breath sounds. No stridor. No wheezing, rhonchi or rales.   Chest:      Chest wall: No tenderness.   Lymphadenopathy:      Cervical: No cervical adenopathy.   Neurological:      Mental Status: She is alert.   Psychiatric:         Attention and Perception: Attention normal.         Mood and Affect: Mood normal.          Result Review :     The following data was reviewed by: GABRIELE Coronel on 03/23/2023:           Assessment and Plan    Diagnoses and all orders for this visit:    1. Acute non-recurrent maxillary sinusitis (Primary)  Comments:  Take medication as directed  Orders:  -     amoxicillin (AMOXIL) 875 MG tablet; Take 1 tablet by mouth Every 12 (Twelve) Hours. For infection  Dispense: 20 tablet; Refill: 0    2. Cough with congestion of " paranasal sinus  Comments:  Follow-up with any worsening symptoms or no improvements  Orders:  -     amoxicillin (AMOXIL) 875 MG tablet; Take 1 tablet by mouth Every 12 (Twelve) Hours. For infection  Dispense: 20 tablet; Refill: 0      -amoxicillin was sent to the patient's pharmacy  - Flonase nasal spray as needed  -Take all medications as prescribed and until completed.  -Monitor for fever and take Tylenol as needed.  Drink plenty of fluids and get plenty of rest.  -Use cool-mist humidifier  -Seek immediate medical attention for fever unrelieved by Tylenol, chest pain, shortness of breath, sharp back pain, or any other worsening signs or symptoms.  -The patient verbalized understanding of all instructions given today.    Follow Up   Return if symptoms worsen or fail to improve, for keep regular follow up with PCP as directed.  Patient was given instructions and counseling regarding her condition or for health maintenance advice. Please see specific information pulled into the AVS if appropriate.

## 2023-05-24 RX ORDER — METOPROLOL SUCCINATE 25 MG/1
TABLET, EXTENDED RELEASE ORAL
Qty: 180 TABLET | Refills: 3 | Status: SHIPPED | OUTPATIENT
Start: 2023-05-24

## 2023-06-07 ENCOUNTER — LAB (OUTPATIENT)
Dept: LAB | Facility: HOSPITAL | Age: 78
End: 2023-06-07
Payer: MEDICARE

## 2023-06-07 PROCEDURE — 84443 ASSAY THYROID STIM HORMONE: CPT | Performed by: FAMILY MEDICINE

## 2023-06-07 PROCEDURE — 80061 LIPID PANEL: CPT | Performed by: FAMILY MEDICINE

## 2023-06-07 PROCEDURE — 80053 COMPREHEN METABOLIC PANEL: CPT | Performed by: FAMILY MEDICINE

## 2023-06-07 PROCEDURE — 85025 COMPLETE CBC W/AUTO DIFF WBC: CPT | Performed by: FAMILY MEDICINE

## 2023-06-13 NOTE — PROGRESS NOTES
"Subjective   Minnie Ross is a 77 y.o. female.     CC: Tremor    History of Present Illness     Pt comes in today to discuss some issues with hand tremors (both hands). Pt noticed this about 6 weeks ago and reports it comes and goes, but is new. First noticed this with driving.     Ancillary, her BPs remain elevated with time and had done well with Benazepril prior.      The following portions of the patient's history were reviewed and updated as appropriate: allergies, current medications, past family history, past medical history, past social history, past surgical history, and problem list.    Review of Systems   Constitutional:  Negative for activity change, chills and fever.   Respiratory:  Negative for cough.    Cardiovascular:  Negative for chest pain.   Neurological:  Positive for tremors.   Psychiatric/Behavioral:  Negative for dysphoric mood.      /73   Pulse 55   Temp 98.4 °F (36.9 °C) (Oral)   Resp 14   Ht 160 cm (63\")   Wt 78 kg (172 lb)   LMP  (LMP Unknown)   SpO2 97%   BMI 30.47 kg/m²     Objective   Physical Exam  Constitutional:       General: She is not in acute distress.     Appearance: She is well-developed.   Cardiovascular:      Rate and Rhythm: Normal rate and regular rhythm.   Pulmonary:      Effort: Pulmonary effort is normal.      Breath sounds: Normal breath sounds.   Neurological:      Mental Status: She is alert and oriented to person, place, and time.   Psychiatric:         Behavior: Behavior normal.         Thought Content: Thought content normal.   Labs reviewed with pt today during visit. All questions answered.      Assessment & Plan   Diagnoses and all orders for this visit:    1. Tremor (Primary)  -     Ambulatory Referral to Neurology    2. Primary hypertension  Comments:  uncontrolled, medication adjusted today  Orders:  -     amLODIPine-benazepril (Lotrel) 5-20 MG per capsule; Take 1 capsule by mouth Daily.  Dispense: 90 capsule; Refill: 1                 "

## 2023-06-14 ENCOUNTER — OFFICE VISIT (OUTPATIENT)
Dept: FAMILY MEDICINE CLINIC | Facility: CLINIC | Age: 78
End: 2023-06-14
Payer: MEDICARE

## 2023-06-14 VITALS
WEIGHT: 172 LBS | DIASTOLIC BLOOD PRESSURE: 73 MMHG | RESPIRATION RATE: 14 BRPM | TEMPERATURE: 98.4 F | HEART RATE: 55 BPM | BODY MASS INDEX: 30.48 KG/M2 | HEIGHT: 63 IN | SYSTOLIC BLOOD PRESSURE: 152 MMHG | OXYGEN SATURATION: 97 %

## 2023-06-14 DIAGNOSIS — I10 PRIMARY HYPERTENSION: ICD-10-CM

## 2023-06-14 DIAGNOSIS — R25.1 TREMOR: Primary | ICD-10-CM

## 2023-06-14 PROCEDURE — 1159F MED LIST DOCD IN RCRD: CPT | Performed by: FAMILY MEDICINE

## 2023-06-14 PROCEDURE — 1160F RVW MEDS BY RX/DR IN RCRD: CPT | Performed by: FAMILY MEDICINE

## 2023-06-14 PROCEDURE — 99214 OFFICE O/P EST MOD 30 MIN: CPT | Performed by: FAMILY MEDICINE

## 2023-06-14 PROCEDURE — 3077F SYST BP >= 140 MM HG: CPT | Performed by: FAMILY MEDICINE

## 2023-06-14 PROCEDURE — 3078F DIAST BP <80 MM HG: CPT | Performed by: FAMILY MEDICINE

## 2023-06-14 RX ORDER — AMLODIPINE BESYLATE AND BENAZEPRIL HYDROCHLORIDE 5; 20 MG/1; MG/1
1 CAPSULE ORAL DAILY
Qty: 90 CAPSULE | Refills: 1 | Status: SHIPPED | OUTPATIENT
Start: 2023-06-14

## 2023-07-24 ENCOUNTER — TRANSCRIBE ORDERS (OUTPATIENT)
Dept: ADMINISTRATIVE | Facility: HOSPITAL | Age: 78
End: 2023-07-24
Payer: MEDICARE

## 2023-07-24 DIAGNOSIS — R91.8 PULMONARY NODULES: Primary | ICD-10-CM

## 2023-07-25 ENCOUNTER — HOSPITAL ENCOUNTER (OUTPATIENT)
Dept: CT IMAGING | Facility: HOSPITAL | Age: 78
Discharge: HOME | End: 2023-07-25
Admitting: INTERNAL MEDICINE
Payer: MEDICARE

## 2023-07-25 DIAGNOSIS — R91.8 PULMONARY NODULES: ICD-10-CM

## 2023-07-25 PROCEDURE — 71250 CT THORAX DX C-: CPT

## 2023-08-01 ENCOUNTER — OFFICE VISIT (OUTPATIENT)
Dept: CARDIOLOGY | Facility: CLINIC | Age: 78
End: 2023-08-01
Payer: MEDICARE

## 2023-08-01 VITALS
HEART RATE: 65 BPM | HEIGHT: 63 IN | WEIGHT: 176 LBS | SYSTOLIC BLOOD PRESSURE: 120 MMHG | DIASTOLIC BLOOD PRESSURE: 82 MMHG | BODY MASS INDEX: 31.18 KG/M2

## 2023-08-01 DIAGNOSIS — I48.3 TYPICAL ATRIAL FLUTTER: Primary | ICD-10-CM

## 2023-08-01 PROCEDURE — 3074F SYST BP LT 130 MM HG: CPT | Performed by: INTERNAL MEDICINE

## 2023-08-01 PROCEDURE — 3079F DIAST BP 80-89 MM HG: CPT | Performed by: INTERNAL MEDICINE

## 2023-08-01 PROCEDURE — 99213 OFFICE O/P EST LOW 20 MIN: CPT | Performed by: INTERNAL MEDICINE

## 2023-08-01 PROCEDURE — 93000 ELECTROCARDIOGRAM COMPLETE: CPT | Performed by: INTERNAL MEDICINE

## 2023-11-02 DIAGNOSIS — I10 PRIMARY HYPERTENSION: ICD-10-CM

## 2023-11-02 RX ORDER — AMLODIPINE BESYLATE AND BENAZEPRIL HYDROCHLORIDE 5; 20 MG/1; MG/1
1 CAPSULE ORAL DAILY
Qty: 90 CAPSULE | Refills: 0 | Status: SHIPPED | OUTPATIENT
Start: 2023-11-02

## 2023-11-02 RX ORDER — AMLODIPINE BESYLATE AND BENAZEPRIL HYDROCHLORIDE 5; 20 MG/1; MG/1
1 CAPSULE ORAL DAILY
Qty: 90 CAPSULE | Refills: 0 | OUTPATIENT
Start: 2023-11-02

## 2023-11-17 ENCOUNTER — CLINICAL SUPPORT (OUTPATIENT)
Dept: FAMILY MEDICINE CLINIC | Facility: CLINIC | Age: 78
End: 2023-11-17
Payer: MEDICARE

## 2023-11-17 DIAGNOSIS — Z23 NEED FOR INFLUENZA VACCINATION: Primary | ICD-10-CM

## 2023-11-17 PROCEDURE — G0008 ADMIN INFLUENZA VIRUS VAC: HCPCS | Performed by: FAMILY MEDICINE

## 2023-11-17 PROCEDURE — 90662 IIV NO PRSV INCREASED AG IM: CPT | Performed by: FAMILY MEDICINE

## 2024-02-12 ENCOUNTER — APPOINTMENT (OUTPATIENT)
Dept: WOMENS IMAGING | Facility: HOSPITAL | Age: 79
End: 2024-02-12
Payer: MEDICARE

## 2024-02-12 PROCEDURE — 77063 BREAST TOMOSYNTHESIS BI: CPT | Performed by: RADIOLOGY

## 2024-02-12 PROCEDURE — 77067 SCR MAMMO BI INCL CAD: CPT | Performed by: RADIOLOGY

## 2024-02-16 DIAGNOSIS — I10 PRIMARY HYPERTENSION: ICD-10-CM

## 2024-02-16 RX ORDER — AMLODIPINE BESYLATE AND BENAZEPRIL HYDROCHLORIDE 5; 20 MG/1; MG/1
1 CAPSULE ORAL DAILY
Qty: 15 CAPSULE | Refills: 0 | Status: SHIPPED | OUTPATIENT
Start: 2024-02-16

## 2024-02-19 ENCOUNTER — TELEPHONE (OUTPATIENT)
Dept: FAMILY MEDICINE CLINIC | Facility: CLINIC | Age: 79
End: 2024-02-19
Payer: MEDICARE

## 2024-02-19 DIAGNOSIS — I10 PRIMARY HYPERTENSION: Primary | ICD-10-CM

## 2024-02-19 DIAGNOSIS — R91.1 PULMONARY NODULE: ICD-10-CM

## 2024-02-19 DIAGNOSIS — R06.09 DYSPNEA ON EXERTION: ICD-10-CM

## 2024-02-19 NOTE — TELEPHONE ENCOUNTER
Caller: Minnie Ross    Relationship: Self    Best call back number: [phone]    What orders are you requesting (i.e. lab or imaging): LABS    In what timeframe would the patient need to come in:     Where will you receive your lab/imaging services: [facility] IN [place]    Additional notes: PATIENT STATED SHE NEEDS TO COME IN FOR MEDICATION REFILLS, BUT WOULD LIKE LAB WORK PRIOR TO COMING IN.    PLEASE CALL ONCE ORDERS ARE PLACED, SHE WOULD LIKE TO SCHEDULE APPOINTMENT WITH DR SHORT AFTER SCHEDULING LABS.

## 2024-02-20 NOTE — TELEPHONE ENCOUNTER
I TALKED TO PT. PT TOLD LABS HAVE BEEN ORDERED. PT SCHEDULED LAB APPOINTMENT 02/17/2024 AND FOLLOW UP APPOINTMENT 02/27/2024 WITH DR SHORT

## 2024-02-23 LAB
ALBUMIN SERPL-MCNC: 4.6 G/DL (ref 3.5–5.2)
ALBUMIN/GLOB SERPL: 1.6 G/DL
ALP SERPL-CCNC: 112 U/L (ref 39–117)
ALT SERPL-CCNC: 24 U/L (ref 1–33)
AST SERPL-CCNC: 27 U/L (ref 1–32)
BASOPHILS # BLD AUTO: 0.06 10*3/MM3 (ref 0–0.2)
BASOPHILS NFR BLD AUTO: 0.9 % (ref 0–1.5)
BILIRUB SERPL-MCNC: 0.4 MG/DL (ref 0–1.2)
BUN SERPL-MCNC: 16 MG/DL (ref 8–23)
BUN/CREAT SERPL: 21.1 (ref 7–25)
CALCIUM SERPL-MCNC: 9.8 MG/DL (ref 8.6–10.5)
CHLORIDE SERPL-SCNC: 103 MMOL/L (ref 98–107)
CHOLEST SERPL-MCNC: 194 MG/DL (ref 0–200)
CO2 SERPL-SCNC: 22.9 MMOL/L (ref 22–29)
CREAT SERPL-MCNC: 0.76 MG/DL (ref 0.57–1)
EGFRCR SERPLBLD CKD-EPI 2021: 81.3 ML/MIN/1.73
EOSINOPHIL # BLD AUTO: 0.12 10*3/MM3 (ref 0–0.4)
EOSINOPHIL NFR BLD AUTO: 1.8 % (ref 0.3–6.2)
ERYTHROCYTE [DISTWIDTH] IN BLOOD BY AUTOMATED COUNT: 12.6 % (ref 12.3–15.4)
GLOBULIN SER CALC-MCNC: 2.9 GM/DL
GLUCOSE SERPL-MCNC: 93 MG/DL (ref 65–99)
HCT VFR BLD AUTO: 44.5 % (ref 34–46.6)
HDLC SERPL-MCNC: 66 MG/DL (ref 40–60)
HGB BLD-MCNC: 14.8 G/DL (ref 12–15.9)
IMM GRANULOCYTES # BLD AUTO: 0.01 10*3/MM3 (ref 0–0.05)
IMM GRANULOCYTES NFR BLD AUTO: 0.1 % (ref 0–0.5)
LDLC SERPL CALC-MCNC: 111 MG/DL (ref 0–100)
LDLC/HDLC SERPL: 1.64 {RATIO}
LYMPHOCYTES # BLD AUTO: 2.48 10*3/MM3 (ref 0.7–3.1)
LYMPHOCYTES NFR BLD AUTO: 36.4 % (ref 19.6–45.3)
MCH RBC QN AUTO: 30.3 PG (ref 26.6–33)
MCHC RBC AUTO-ENTMCNC: 33.3 G/DL (ref 31.5–35.7)
MCV RBC AUTO: 91 FL (ref 79–97)
MONOCYTES # BLD AUTO: 0.53 10*3/MM3 (ref 0.1–0.9)
MONOCYTES NFR BLD AUTO: 7.8 % (ref 5–12)
NEUTROPHILS # BLD AUTO: 3.61 10*3/MM3 (ref 1.7–7)
NEUTROPHILS NFR BLD AUTO: 53 % (ref 42.7–76)
NRBC BLD AUTO-RTO: 0 /100 WBC (ref 0–0.2)
PLATELET # BLD AUTO: 391 10*3/MM3 (ref 140–450)
POTASSIUM SERPL-SCNC: 5 MMOL/L (ref 3.5–5.2)
PROT SERPL-MCNC: 7.5 G/DL (ref 6–8.5)
RBC # BLD AUTO: 4.89 10*6/MM3 (ref 3.77–5.28)
SODIUM SERPL-SCNC: 139 MMOL/L (ref 136–145)
TRIGL SERPL-MCNC: 98 MG/DL (ref 0–150)
TSH SERPL DL<=0.005 MIU/L-ACNC: 1.45 UIU/ML (ref 0.27–4.2)
VLDLC SERPL CALC-MCNC: 17 MG/DL (ref 5–40)
WBC # BLD AUTO: 6.81 10*3/MM3 (ref 3.4–10.8)

## 2024-02-26 NOTE — PROGRESS NOTES
The ABCs of the Annual Wellness Visit  Subsequent Medicare Wellness Visit    Subjective    Minnie Ross is a 78 y.o. female who presents for a Subsequent Medicare Wellness Visit.    The following portions of the patient's history were reviewed and   updated as appropriate: allergies, current medications, past family history, past medical history, past social history, past surgical history, and problem list.    Compared to one year ago, the patient feels her physical   health is the same.    Compared to one year ago, the patient feels her mental   health is the same.    Recent Hospitalizations:  She was not admitted to the hospital during the last year.       Current Medical Providers:  Patient Care Team:  Andriy Nielson MD as PCP - General (Family Medicine)  Minnie Colindres MD as Consulting Physician (Dermatology)  Tammie Walsh APRN as Nurse Practitioner (Nurse Practitioner)  Blas Mai MD as Consulting Physician (Ophthalmology)  Andriy Hernandez MD (Internal Medicine)  Andriy Nielson MD as Referring Physician (Family Medicine)  Betito Ko MD as Consulting Physician (Hematology and Oncology)    Outpatient Medications Prior to Visit   Medication Sig Dispense Refill    ascorbic acid (VITAMIN C) 1000 MG tablet       Calcium-Magnesium-Vitamin D (CITRACAL CALCIUM+D PO)       Multiple Vitamins-Minerals (ICAPS AREDS 2 PO) Take  by mouth.      rivaroxaban (XARELTO) 20 MG tablet Take 1 tablet by mouth Daily. 14 tablet 3    TART CHERRY PO Take 465 mg by mouth.      TURMERIC PO Take  by mouth.      Zinc Sulfate (ZINC 15 PO) Take  by mouth.      amLODIPine-benazepril (LOTREL 5-20) 5-20 MG per capsule Take 1 capsule by mouth once daily 15 capsule 0     No facility-administered medications prior to visit.       No opioid medication identified on active medication list. I have reviewed chart for other potential  high risk medication/s and harmful drug interactions in the elderly.        Aspirin is not on  "active medication list.  Aspirin use is not indicated based on review of current medical condition/s. Risk of harm outweighs potential benefits.  .    Patient Active Problem List   Diagnosis    Hypertension    Eczema    Primary osteoarthritis    Chest pain    Dyspnea on exertion    Diaphoresis    Pulmonary nodule    Bruising    Atrial flutter     Advance Care Planning   Advance Care Planning     Advance Directive is not on file.  ACP discussion was held with the patient during this visit. Patient has an advance directive (not in EMR), copy requested.     Objective    Vitals:    24 0905   BP: 128/74   Pulse: 86   Resp: 16   Temp: 97.7 °F (36.5 °C)   TempSrc: Oral   SpO2: 97%   Weight: 78.9 kg (174 lb)   Height: 160 cm (63\")   PainSc: 0-No pain     Estimated body mass index is 30.82 kg/m² as calculated from the following:    Height as of this encounter: 160 cm (63\").    Weight as of this encounter: 78.9 kg (174 lb).    BMI is >= 30 and <35. (Class 1 Obesity). The following options were offered after discussion;: exercise counseling/recommendations and nutrition counseling/recommendations      Does the patient have evidence of cognitive impairment? No    Lab Results   Component Value Date    CHLPL 194 2024    TRIG 98 2024    HDL 66 (H) 2024     (H) 2024    VLDL 17 2024        HEALTH RISK ASSESSMENT    Smoking Status:  Social History     Tobacco Use   Smoking Status Former    Packs/day: 1.00    Years: 25.00    Additional pack years: 0.00    Total pack years: 25.00    Types: Cigarettes    Quit date: 2007    Years since quittin.1   Smokeless Tobacco Never   Tobacco Comments    Caffeine - approximately 2 diet cokes a day     Alcohol Consumption:  Social History     Substance and Sexual Activity   Alcohol Use Yes    Alcohol/week: 7.0 standard drinks of alcohol    Types: 7 Glasses of wine per week    Comment: 3-4 week     Fall Risk Screen:    JEANETTE Fall Risk Assessment has " not been completed.    Depression Screenin/27/2024     9:09 AM   PHQ-2/PHQ-9 Depression Screening   Little Interest or Pleasure in Doing Things 0-->not at all   Feeling Down, Depressed or Hopeless 0-->not at all   PHQ-9: Brief Depression Severity Measure Score 0       Health Habits and Functional and Cognitive Screenin/27/2024     9:00 AM   Functional & Cognitive Status   Do you have difficulty preparing food and eating? No   Do you have difficulty bathing yourself, getting dressed or grooming yourself? No   Do you have difficulty using the toilet? No   Do you have difficulty moving around from place to place? No   Do you have trouble with steps or getting out of a bed or a chair? No   Current Diet Well Balanced Diet   Dental Exam Up to date   Eye Exam Up to date   Exercise (times per week) 3 times per week   Current Exercises Include Walking   Do you need help using the phone?  No   Are you deaf or do you have serious difficulty hearing?  No   Do you need help to go to places out of walking distance? No   Do you need help shopping? No   Do you need help preparing meals?  No   Do you need help with housework?  No   Do you need help with laundry? No   Do you need help taking your medications? No   Do you need help managing money? No   Do you ever drive or ride in a car without wearing a seat belt? No   Have you felt unusual stress, anger or loneliness in the last month? No   Who do you live with? Spouse   If you need help, do you have trouble finding someone available to you? Yes   Have you been bothered in the last four weeks by sexual problems? No   Do you have difficulty concentrating, remembering or making decisions? No       Age-appropriate Screening Schedule:  Refer to the list below for future screening recommendations based on patient's age, sex and/or medical conditions. Orders for these recommended tests are listed in the plan section. The patient has been provided with a written  plan.    Health Maintenance   Topic Date Due    TDAP/TD VACCINES (2 - Td or Tdap) 02/27/2024 (Originally 5/1/2022)    COVID-19 Vaccine (3 - 2023-24 season) 02/29/2024 (Originally 9/1/2023)    PAP SMEAR  04/20/2024 (Originally 1/15/2021)    ZOSTER VACCINE (2 of 2) 06/14/2024 (Originally 2/26/2015)    RSV Vaccine - Adults (1 - 1-dose 60+ series) 02/27/2025 (Originally 7/25/2007)    DXA SCAN  11/03/2024    LIPID PANEL  02/23/2025    ANNUAL WELLNESS VISIT  02/27/2025    BMI FOLLOWUP  02/27/2025    COLORECTAL CANCER SCREENING  12/01/2030    INFLUENZA VACCINE  Completed    Pneumococcal Vaccine 65+  Completed    HEPATITIS C SCREENING  Discontinued                  CMS Preventative Services Quick Reference  Risk Factors Identified During Encounter  None Identified  The above risks/problems have been discussed with the patient.  Pertinent information has been shared with the patient in the After Visit Summary.  An After Visit Summary and PPPS were made available to the patient.    Follow Up:   Next Medicare Wellness visit to be scheduled in 1 year.       Additional E&M Note during same encounter follows:  Patient has multiple medical problems which are significant and separately identifiable that require additional work above and beyond the Medicare Wellness Visit.      Chief Complaint  Hypertension and medicare wellness (Due )    Subjective        HPI  Minnie Ross is also being seen today for medication management.    Pt doing well on the medication(s) w/o SEs, and is due refill today.      Review of Systems   Constitutional:  Negative for chills and fever.   HENT:  Negative for congestion and sinus pressure.    Eyes:  Negative for visual disturbance.   Respiratory:  Negative for cough and shortness of breath.    Cardiovascular:  Negative for chest pain.   Gastrointestinal:  Negative for abdominal pain.   Genitourinary:  Negative for dysuria.   Skin:  Negative for rash.   Hematological:  Negative for adenopathy.  "  Psychiatric/Behavioral:  Negative for dysphoric mood.        Objective   Vital Signs:  /74   Pulse 86   Temp 97.7 °F (36.5 °C) (Oral)   Resp 16   Ht 160 cm (63\")   Wt 78.9 kg (174 lb)   SpO2 97%   BMI 30.82 kg/m²     Physical Exam  Constitutional:       General: She is not in acute distress.     Appearance: She is well-developed.   Cardiovascular:      Rate and Rhythm: Normal rate and regular rhythm.   Pulmonary:      Effort: Pulmonary effort is normal.      Breath sounds: Normal breath sounds.   Neurological:      Mental Status: She is alert and oriented to person, place, and time.   Psychiatric:         Behavior: Behavior normal.         Thought Content: Thought content normal.      Labs reviewed with pt today during visit. All questions answered.      The following data was reviewed by: Andriy Nielson MD on 02/27/2024:  Common labs          6/7/2023    10:16 2/23/2024    09:57   Common Labs   Glucose 80  93    BUN 14  16    Creatinine 0.76  0.76    Sodium 140  139    Potassium 4.5  5.0    Chloride 103  103    Calcium 9.4  9.8    Total Protein  7.5    Albumin 4.5  4.6    Total Bilirubin 0.3  0.4    Alkaline Phosphatase 124  112    AST (SGOT) 30  27    ALT (SGPT) 25  24    WBC 7.11  6.81    Hemoglobin 13.8  14.8    Hematocrit 41.7  44.5    Platelets 371  391    Total Cholesterol 181     Total Cholesterol  194    Triglycerides 85  98    HDL Cholesterol 60  66    LDL Cholesterol  105  111                 Assessment and Plan   Diagnoses and all orders for this visit:    1. Encounter for subsequent annual wellness visit (AWV) in Medicare patient (Primary)    2. Primary hypertension  -     amLODIPine-benazepril (LOTREL 5-20) 5-20 MG per capsule; Take 1 capsule by mouth Daily.  Dispense: 90 capsule; Refill: 3           I spent 15 minutes caring for Minnie on this date of service. This time includes time spent by me in the following activities:preparing for the visit, reviewing tests, performing a " medically appropriate examination and/or evaluation , ordering medications, tests, or procedures, and documenting information in the medical record  Follow Up   Return in about 1 year (around 2/27/2025) for Recheck.  Patient was given instructions and counseling regarding her condition or for health maintenance advice. Please see specific information pulled into the AVS if appropriate.

## 2024-02-27 ENCOUNTER — OFFICE VISIT (OUTPATIENT)
Dept: FAMILY MEDICINE CLINIC | Facility: CLINIC | Age: 79
End: 2024-02-27
Payer: MEDICARE

## 2024-02-27 VITALS
TEMPERATURE: 97.7 F | WEIGHT: 174 LBS | SYSTOLIC BLOOD PRESSURE: 128 MMHG | HEIGHT: 63 IN | OXYGEN SATURATION: 97 % | HEART RATE: 86 BPM | BODY MASS INDEX: 30.83 KG/M2 | DIASTOLIC BLOOD PRESSURE: 74 MMHG | RESPIRATION RATE: 16 BRPM

## 2024-02-27 DIAGNOSIS — I10 PRIMARY HYPERTENSION: Chronic | ICD-10-CM

## 2024-02-27 DIAGNOSIS — Z00.00 ENCOUNTER FOR SUBSEQUENT ANNUAL WELLNESS VISIT (AWV) IN MEDICARE PATIENT: Primary | ICD-10-CM

## 2024-02-27 RX ORDER — AMLODIPINE BESYLATE AND BENAZEPRIL HYDROCHLORIDE 5; 20 MG/1; MG/1
1 CAPSULE ORAL DAILY
Qty: 90 CAPSULE | Refills: 3 | Status: SHIPPED | OUTPATIENT
Start: 2024-02-27

## 2024-02-27 NOTE — PATIENT INSTRUCTIONS
Medicare Wellness  Personal Prevention Plan of Service     Date of Office Visit:    Encounter Provider:  Andriy Nielson MD  Place of Service:  [facility]  Patient Name: Minnie Ross  :  1945    As part of the Medicare Wellness portion of your visit today, we are providing you with this personalized preventive plan of services (PPPS). This plan is based upon recommendations of the [place] Preventive Services Task Force (USPSTF) and the Advisory Committee on Immunization Practices (ACIP).    This lists the preventive care services that should be considered, and provides dates of when you are due. Items listed as completed are up-to-date and do not require any further intervention.    Health Maintenance   Topic Date Due    TDAP/TD VACCINES (2 - Td or Tdap) 2024 (Originally 2022)    COVID-19 Vaccine (3 - -24 season) 2024 (Originally 2023)    PAP SMEAR  2024 (Originally 1/15/2021)    ZOSTER VACCINE (2 of 2) 2024 (Originally 2015)    RSV Vaccine - Adults (1 - 1-dose 60+ series) 2025 (Originally 2007)    DXA SCAN  2024    LIPID PANEL  2025    ANNUAL WELLNESS VISIT  2025    BMI FOLLOWUP  2025    COLORECTAL CANCER SCREENING  2030    INFLUENZA VACCINE  Completed    Pneumococcal Vaccine 65+  Completed    HEPATITIS C SCREENING  Discontinued       No orders of the defined types were placed in this encounter.      Return in about 1 year (around 2025) for Recheck.

## 2024-05-20 ENCOUNTER — TELEPHONE (OUTPATIENT)
Dept: FAMILY MEDICINE CLINIC | Facility: CLINIC | Age: 79
End: 2024-05-20
Payer: MEDICARE

## 2024-05-20 RX ORDER — AMOXICILLIN 500 MG/1
TABLET, FILM COATED ORAL
Qty: 4 TABLET | Refills: 11 | Status: SHIPPED | OUTPATIENT
Start: 2024-05-20

## 2024-05-20 NOTE — TELEPHONE ENCOUNTER
Caller: Minnie Ross    Relationship: Self    Best call back number: [phone]    What medication are you requesting: AMOXICILLIN 500 MG    Have you had these symptoms before:    [] Yes  [x] No    Have you been treated for these symptoms before:   [] Yes  [x] No    If a prescription is needed, what is your preferred pharmacy and phone number: [facility] [address] [place] [address] [facility] [phone] [facility] [phone]      Additional notes: STATED THAT THEY HAD BEEN GETTING THE MEDICATION THROUGH THEIR ORTHOPEDIC DOCTOR BUT THEY NOW WILL NOT GIVE THE MEDICATION. STATED THAT THEIR ORTHOPEDIC REQUIRES THEM TO TAKE THE MEDICATION BEFORE A DENTAL VISIT. STATED THAT THEY NEED TO GET THE MEDICATION TO COVER THEIR VISITS AS THEY HAVE 3 DENTAL VISITS A YEAR. PLEASE CALL AND ADVISE

## 2024-06-28 ENCOUNTER — OFFICE VISIT (OUTPATIENT)
Dept: FAMILY MEDICINE CLINIC | Facility: CLINIC | Age: 79
End: 2024-06-28
Payer: MEDICARE

## 2024-06-28 ENCOUNTER — HOSPITAL ENCOUNTER (OUTPATIENT)
Dept: GENERAL RADIOLOGY | Facility: HOSPITAL | Age: 79
Discharge: HOME | End: 2024-06-28
Payer: MEDICARE

## 2024-06-28 VITALS
HEIGHT: 63 IN | HEART RATE: 73 BPM | WEIGHT: 175 LBS | DIASTOLIC BLOOD PRESSURE: 78 MMHG | SYSTOLIC BLOOD PRESSURE: 110 MMHG | OXYGEN SATURATION: 99 % | BODY MASS INDEX: 31.01 KG/M2 | TEMPERATURE: 98.4 F

## 2024-06-28 DIAGNOSIS — S76.912A MUSCLE STRAIN OF LEFT THIGH, INITIAL ENCOUNTER: ICD-10-CM

## 2024-06-28 DIAGNOSIS — M25.562 ACUTE PAIN OF LEFT KNEE: ICD-10-CM

## 2024-06-28 DIAGNOSIS — M25.562 ACUTE PAIN OF LEFT KNEE: Primary | ICD-10-CM

## 2024-06-28 PROCEDURE — 3074F SYST BP LT 130 MM HG: CPT | Performed by: NURSE PRACTITIONER

## 2024-06-28 PROCEDURE — 73560 X-RAY EXAM OF KNEE 1 OR 2: CPT

## 2024-06-28 PROCEDURE — 99213 OFFICE O/P EST LOW 20 MIN: CPT | Performed by: NURSE PRACTITIONER

## 2024-06-28 PROCEDURE — 3078F DIAST BP <80 MM HG: CPT | Performed by: NURSE PRACTITIONER

## 2024-06-28 PROCEDURE — 1125F AMNT PAIN NOTED PAIN PRSNT: CPT | Performed by: NURSE PRACTITIONER

## 2024-06-28 RX ORDER — CYCLOBENZAPRINE HCL 5 MG
5 TABLET ORAL 3 TIMES DAILY PRN
Qty: 30 TABLET | Refills: 1 | Status: SHIPPED | OUTPATIENT
Start: 2024-06-28 | End: 2024-06-28

## 2024-06-28 RX ORDER — CYCLOBENZAPRINE HCL 5 MG
5 TABLET ORAL 3 TIMES DAILY PRN
Qty: 30 TABLET | Refills: 1 | Status: SHIPPED | OUTPATIENT
Start: 2024-06-28

## 2024-06-28 RX ORDER — PREDNISONE 20 MG/1
20 TABLET ORAL 2 TIMES DAILY
Qty: 10 TABLET | Refills: 0 | Status: SHIPPED | OUTPATIENT
Start: 2024-06-28 | End: 2024-06-28

## 2024-06-28 RX ORDER — PREDNISONE 20 MG/1
20 TABLET ORAL 2 TIMES DAILY
Qty: 10 TABLET | Refills: 0 | Status: SHIPPED | OUTPATIENT
Start: 2024-06-28 | End: 2024-07-03

## 2024-06-28 NOTE — PROGRESS NOTES
"Chief Complaint  Leg Pain (Pulled muscle left leg)    Subjective        HPI   Minnie presents to [facility] as a 78-year-old female complaining of pain in her left leg/knee since last week.  States that she was at a restaurant and did step off of a stool.  She felt like her knee buckled slightly  She has had some pain from her thigh down to her calf intermittently.  She has been using Tylenol and alternating ice and heat with some symptom improvement.  She did have some muscle relaxers at home and did take a couple that she felt helped slightly  No previous knee injury      She has no other acute complaints today    The following portions of the patient's history were reviewed and updated as appropriate: allergies, current medications, past family history, past medical history, past social history, past surgical history, and problem list    Review of Systems   Constitutional:  Negative for chills, fatigue and fever.   Eyes:  Negative for visual disturbance.   Respiratory:  Negative for cough, wheezing and stridor.    Cardiovascular:  Negative for chest pain, palpitations and leg swelling.   Musculoskeletal:  Positive for arthralgias and myalgias.   Neurological:  Negative for dizziness.   Psychiatric/Behavioral:  Negative for self-injury, sleep disturbance and suicidal ideas. The patient is not nervous/anxious.         Objective   Vital Signs:   Vitals:    06/28/24 1350   BP: 110/78   Pulse: 73   Temp: 98.4 °F (36.9 °C)   SpO2: 99%   Weight: 79.4 kg (175 lb)   Height: 160 cm (62.99\")   PainSc:   8   PainLoc: Leg            2/27/2024     9:09 AM   PHQ-2/PHQ-9 Depression Screening   Little Interest or Pleasure in Doing Things 0-->not at all   Feeling Down, Depressed or Hopeless 0-->not at all   PHQ-9: Brief Depression Severity Measure Score 0                 Physical Exam  Vitals reviewed.   Constitutional:       General: She is not in acute distress.  Eyes:      Conjunctiva/sclera: " Conjunctivae normal.   Neck:      Thyroid: No thyromegaly.      Vascular: No carotid bruit.   Cardiovascular:      Rate and Rhythm: Normal rate and regular rhythm.      Heart sounds: Normal heart sounds. No murmur heard.  Pulmonary:      Effort: Pulmonary effort is normal. No respiratory distress.      Breath sounds: Normal breath sounds. No stridor. No wheezing, rhonchi or rales.   Chest:      Chest wall: No tenderness.   Lymphadenopathy:      Cervical: No cervical adenopathy.   Neurological:      Mental Status: She is alert and oriented to person, place, and time.   Psychiatric:         Attention and Perception: Attention normal.         Mood and Affect: Mood normal.          Result Review :     The following data was reviewed by: GABRIELE Coronel on 06/28/2024:           Assessment and Plan    Assessment & Plan  Acute pain of left knee  X-ray ordered today  Short-term prednisone x 5 days  Monitor blood pressure closely  Muscle strain of left thigh, initial encounter  Muscle relaxer as needed  No alcohol or driving while taking Flexeril    Will refer to Ortho if no improvements              Orders Placed This Encounter   Procedures    XR Knee 1 or 2 View Left     New Medications Ordered This Visit   Medications    cyclobenzaprine (FLEXERIL) 5 MG tablet     Sig: Take 1 tablet by mouth 3 (Three) Times a Day As Needed for Muscle Spasms. Avoid with driving. Do not use alcohol with this prescription     Dispense:  30 tablet     Refill:  1    predniSONE (DELTASONE) 20 MG tablet     Sig: Take 1 tablet by mouth 2 (Two) Times a Day for 5 days.     Dispense:  10 tablet     Refill:  0          Follow Up   Return if symptoms worsen or fail to improve, for Follow up with PCP as Directed.  Patient was given instructions and counseling regarding her condition or for health maintenance advice. Please see specific information pulled into the AVS if appropriate.

## 2024-07-02 DIAGNOSIS — M25.562 ACUTE PAIN OF LEFT KNEE: Primary | ICD-10-CM

## 2024-07-23 ENCOUNTER — PATIENT ROUNDING (BHMG ONLY) (OUTPATIENT)
Dept: URGENT CARE | Facility: CLINIC | Age: 79
End: 2024-07-23
Payer: MEDICARE

## 2024-07-23 ENCOUNTER — OFFICE VISIT (OUTPATIENT)
Dept: ORTHOPEDIC SURGERY | Facility: CLINIC | Age: 79
End: 2024-07-23
Payer: MEDICARE

## 2024-07-23 VITALS — BODY MASS INDEX: 30.79 KG/M2 | HEIGHT: 63 IN | WEIGHT: 173.8 LBS | TEMPERATURE: 98.6 F

## 2024-07-23 DIAGNOSIS — M25.562 LEFT KNEE PAIN, UNSPECIFIED CHRONICITY: Primary | ICD-10-CM

## 2024-07-23 PROCEDURE — 1160F RVW MEDS BY RX/DR IN RCRD: CPT | Performed by: ORTHOPAEDIC SURGERY

## 2024-07-23 PROCEDURE — 1159F MED LIST DOCD IN RCRD: CPT | Performed by: ORTHOPAEDIC SURGERY

## 2024-07-23 PROCEDURE — 73562 X-RAY EXAM OF KNEE 3: CPT | Performed by: ORTHOPAEDIC SURGERY

## 2024-07-23 PROCEDURE — 99213 OFFICE O/P EST LOW 20 MIN: CPT | Performed by: ORTHOPAEDIC SURGERY

## 2024-07-23 PROCEDURE — 72170 X-RAY EXAM OF PELVIS: CPT | Performed by: ORTHOPAEDIC SURGERY

## 2024-07-23 NOTE — PROGRESS NOTES
General Exam    Patient: Minnie Ross    YOB: 1945    Medical Record Number: [id number]    Chief Complaints: Left knee and leg pain    History of Present Illness:     78 y.o. female patient who presents for evaluation left knee leg pain she states she fell off a barstool about a month ago saw her primary given prednisone which helped.  Things are feeling better as she had been having some lateral knee pain.  And then she has noted some recent ankle swelling but no pain there is some pain on the medial part of her calf and Achilles but recently started prednisone again for some facial irritation and once again symptoms have improved.    Denies any numbness or tingling.  Denies any fevers, cough or shortness of breath.    Allergies: No Known Allergies    Home Medications:      Current Outpatient Medications:     amLODIPine-benazepril (LOTREL 5-20) 5-20 MG per capsule, Take 1 capsule by mouth Daily., Disp: 90 capsule, Rfl: 3    ascorbic acid (VITAMIN C) 1000 MG tablet, , Disp: , Rfl:     Calcium-Magnesium-Vitamin D (CITRACAL CALCIUM+D PO), , Disp: , Rfl:     hydrocortisone 2.5 % cream, Apply 1 Application topically to the appropriate area as directed 3 (Three) Times a Day., Disp: 20 g, Rfl: 0    Milk Thistle 175 MG capsule, , Disp: , Rfl:     Multiple Vitamins-Minerals (ICAPS AREDS 2 PO), Take  by mouth., Disp: , Rfl:     predniSONE (DELTASONE) 20 MG tablet, Take 1 tablet by mouth 2 (Two) Times a Day., Disp: 6 tablet, Rfl: 0    rivaroxaban (XARELTO) 20 MG tablet, Take 1 tablet by mouth Daily., Disp: 14 tablet, Rfl: 3    TART CHERRY PO, Take 465 mg by mouth., Disp: , Rfl:     TURMERIC PO, Take  by mouth., Disp: , Rfl:     Zinc Sulfate (ZINC 15 PO), Take  by mouth., Disp: , Rfl:     Past Medical History:   Diagnosis Date    Abnormal Pap smear of cervix     Arrhythmia     Arthritis     Atrial fibrillation     Atrial flutter     Bursitis of hip     Cataract     H/O complete eye exam 01/2018    History of  blood transfusion     History of bone density study 2018    Hypertension     IBS (irritable bowel syndrome)     Knee swelling     Numbness and tingling     Periarthritis of shoulder     Tremor     Visual impairment        Past Surgical History:   Procedure Laterality Date    APPENDECTOMY  1960    CARDIAC ELECTROPHYSIOLOGY PROCEDURE N/A 2022    Procedure: Ablation atrial flutter;  Surgeon: Daniel Dos Santos MD;  Location: [facility] INVASIVE LOCATION;  Service: Cardiovascular;  Laterality: N/A;    CATARACT EXTRACTION      CHOLECYSTECTOMY      COLONOSCOPY      ENDOMETRIAL ABLATION      JOINT REPLACEMENT  2010    KNEE SURGERY Right 2008    Homero Gaston MD    MAMMO BILATERAL  2018    MOUTH SURGERY  2013    PAP SMEAR  2016    PARTIAL KNEE ARTHROPLASTY  2010       Social History     Occupational History     Employer: RETIRED   Tobacco Use    Smoking status: Former     Current packs/day: 0.00     Average packs/day: 1 pack/day for 25.0 years (25.0 ttl pk-yrs)     Types: Cigarettes     Start date: 1982     Quit date: 2007     Years since quittin.5     Passive exposure: Past    Smokeless tobacco: Never    Tobacco comments:     Caffeine - approximately 2 diet cokes a day   Vaping Use    Vaping status: Never Used   Substance and Sexual Activity    Alcohol use: Yes     Alcohol/week: 7.0 standard drinks of alcohol     Types: 7 Glasses of wine per week     Comment: 3-4 week    Drug use: No    Sexual activity: Not Currently     Partners: Male     Birth control/protection: Post-menopausal      Social History     Social History Narrative    New GYN pt        Family History   Problem Relation Age of Onset    Hypertension Mother     Heart disease Mother     Arthritis Mother     Deep vein thrombosis Brother     Vision loss Father     Hyperlipidemia Sister        Review of Systems:      Constitutional: Denies fever, shaking or chills         All other pertinent positives and negatives  "as noted above in HPI.    Physical Exam: 78 y.o. female    Vitals:    07/23/24 1414   Temp: 98.6 °F (37 °C)   Weight: 78.8 kg (173 lb 12.8 oz)   Height: 160 cm (63\")       General:  Patient is awake and alert.  Appears in no acute distress or discomfort.      Musculoskeletal/Extremities:    Left lower extremity examined no tenderness palpation full hip and knee range of motion.  Full ankle range of motion.  There are some swelling of the lateral part ankle but skin is intact no tenderness palpation.  No tenderness along the Achilles compressible.  Some minimal discomfort medially no pain laterally.  Negative Greg.         Radiology:       Knee imaging taken reviewed to evaluate the patient's complaint/s.    Imaging shows some mild degenerative changes prominent medial compartment early bone sclerosis osteophyte formation no significant changes      Assessment: Left knee pain, ankle swelling      Plan:      Discussed finds the patient this all seems more soft tissue and that things have improved.  She not have any ankle pain she will continue some compression ice and elevation for the swelling.  Saw symptoms are improving with conservative treatment measures she may ease back into activity if things or not improving next few weeks may return can consider further evaluation.           We will plan for follow up as above.    All questions were answered.  Patient understands and agrees with the plan.    Homero Melo MD    07/23/2024    CC to Andriy Nielson MD        Answers submitted by the patient for this visit:  Other (Submitted on 7/16/2024)  Please describe your symptoms.: Left leg/muscle issue…..remains painful and difficulty walking for any extended time.  Have you had these symptoms before?: No  How long have you been having these symptoms?: Greater than 2 weeks  Please list any medications you are currently taking for this condition.: Tylenol and voltaren.   On June 28th received a prescription for " Prednisone for 5 days and a muscle relaxer cyclobenzaprine (currently not taking)  Please describe any probable cause for these symptoms. : Stepped off a tall stool and immediately felt a “patrick” behind the knee.  Primary Reason for Visit (Submitted on 7/16/2024)  What is the primary reason for your visit?: Other

## 2024-07-23 NOTE — ED NOTES
Thank you for letting us care for you at your recent visit to [facility]. We would love to hear about your experience with us. Was this the first time you have visited our location?    We’re always looking for ways to make our patients’ experience even better. Do you have any recommendations on ways we may improve?     Please be on the lookout for a survey about your recent visit from Breanna Daniel via text or email. We would greatly appreciate if you could fill that out and turn it back in. We want your voice to be heard and we value your feedback.     Thank you for choosing [facility] for your healthcare needs. I appreciate you taking the time to respond!

## 2024-08-05 ENCOUNTER — OFFICE VISIT (OUTPATIENT)
Dept: ORTHOPEDIC SURGERY | Facility: CLINIC | Age: 79
End: 2024-08-05
Payer: MEDICARE

## 2024-08-05 VITALS — BODY MASS INDEX: 31.45 KG/M2 | TEMPERATURE: 97.3 F | HEIGHT: 63 IN | WEIGHT: 177.5 LBS

## 2024-08-05 DIAGNOSIS — S76.312A STRAIN OF LEFT HAMSTRING, INITIAL ENCOUNTER: ICD-10-CM

## 2024-08-05 DIAGNOSIS — S86.112A STRAIN OF GASTROCNEMIUS MUSCLE OF LEFT LOWER EXTREMITY, INITIAL ENCOUNTER: Primary | ICD-10-CM

## 2024-08-05 DIAGNOSIS — M25.562 LEFT KNEE PAIN, UNSPECIFIED CHRONICITY: ICD-10-CM

## 2024-08-05 PROCEDURE — 1159F MED LIST DOCD IN RCRD: CPT | Performed by: ORTHOPAEDIC SURGERY

## 2024-08-05 PROCEDURE — 1160F RVW MEDS BY RX/DR IN RCRD: CPT | Performed by: ORTHOPAEDIC SURGERY

## 2024-08-05 PROCEDURE — 99214 OFFICE O/P EST MOD 30 MIN: CPT | Performed by: ORTHOPAEDIC SURGERY

## 2024-08-05 NOTE — PROGRESS NOTES
"Patient: Minnie Ross  YOB: 1945 79 y.o. female  Medical Record Number: [id number]    Chief Complaints:   Chief Complaint   Patient presents with    Left Knee - Pain, Follow-up       History of Present Illness:Minnie Ross is a 79 y.o. female who presents for follow-up of left calf and thigh pain.  She was seen previously with felt that she may have flared up from arthritis was doing pretty well and seen but was finishing a prednisone Dosepak.  States since that ended she has noticed more discomfort in the calf area as well as in the posterior aspect of the thigh.  Overall knee is doing okay.    Allergies: No Known Allergies    Medications:   Current Outpatient Medications   Medication Sig Dispense Refill    amLODIPine-benazepril (LOTREL 5-20) 5-20 MG per capsule Take 1 capsule by mouth Daily. 90 capsule 3    ascorbic acid (VITAMIN C) 1000 MG tablet       Calcium-Magnesium-Vitamin D (CITRACAL CALCIUM+D PO)       Milk Thistle 175 MG capsule       predniSONE (DELTASONE) 20 MG tablet Take 1 tablet by mouth 2 (Two) Times a Day. 6 tablet 0    rivaroxaban (XARELTO) 20 MG tablet Take 1 tablet by mouth Daily. 14 tablet 3    TART CHERRY PO Take 465 mg by mouth.      TURMERIC PO Take  by mouth.      Zinc Sulfate (ZINC 15 PO) Take  by mouth.       No current facility-administered medications for this visit.         The following portions of the patient's history were reviewed and updated as appropriate: allergies, current medications, past family history, past medical history, past social history, past surgical history and problem list.    Review of Systems:   A 14 point review of systems was performed. All systems negative except pertinent positives/negative listed in HPI above    Physical Exam:   Vitals:    08/05/24 1104   Temp: 97.3 °F (36.3 °C)   TempSrc: Temporal   Weight: 80.5 kg (177 lb 8 oz)   Height: 160 cm (62.99\")   PainSc:   7   PainLoc: Knee       General: A and O x 3, ASA, NAD    SCLERA:    " Normal    DENTITION:   Normal  On exam of the left calf there is no increased swelling right contralateral side Homans negative but does have some discomfort of the gastroc muscles that are increased with knee extension and dorsiflexion versus when the knee is flexed.  Motor and sensory intact distally.        Assessment/Plan:  Left gastroc strain, hamstring tendinitis/strain    Discussed the findings the patient is more soft tissue in nature based on history and exam findings.  Recommend some anti-inflammatory gel we will also order some compound cream that she can have to see if that can help.  Gentle stretching and start formal physical therapy.  This is truly soft tissue in nature this can take several months to resolve.  There is any change in the interim she will let us know which things go over the next 3 to 4 weeks.  Symptoms not improve may reevaluate versus order advanced imaging if warranted.  All questions answered.

## 2024-09-12 ENCOUNTER — OFFICE VISIT (OUTPATIENT)
Dept: ORTHOPEDIC SURGERY | Facility: CLINIC | Age: 79
End: 2024-09-12
Payer: MEDICARE

## 2024-09-12 VITALS — TEMPERATURE: 98.2 F | BODY MASS INDEX: 31.22 KG/M2 | WEIGHT: 176.2 LBS | HEIGHT: 63 IN

## 2024-09-12 DIAGNOSIS — M25.562 LEFT KNEE PAIN, UNSPECIFIED CHRONICITY: ICD-10-CM

## 2024-09-12 DIAGNOSIS — S86.112D STRAIN OF GASTROCNEMIUS MUSCLE OF LEFT LOWER EXTREMITY, SUBSEQUENT ENCOUNTER: Primary | ICD-10-CM

## 2024-09-12 NOTE — PROGRESS NOTES
Patient: Minnie Ross  YOB: 1945 79 y.o. female  Medical Record Number: [id number]    Chief Complaints:   Chief Complaint   Patient presents with    Left Knee - Follow-up, Pain       History of Present Illness:Minnie Ross is a 79 y.o. female who presents for follow-up of of left knee pain.  Patient was seen previously diagnosed knee pain muscle strain.  States she is better today.  Been doing conservative treatment as well as therapy.  Occasionally has some discomfort generalized that can be from the behind the knee and in the leg.  But again overall feeling better.    Allergies: No Known Allergies    Medications:   Current Outpatient Medications   Medication Sig Dispense Refill    amLODIPine-benazepril (LOTREL 5-20) 5-20 MG per capsule Take 1 capsule by mouth Daily. 90 capsule 3    ascorbic acid (VITAMIN C) 1000 MG tablet       Calcium-Magnesium-Vitamin D (CITRACAL CALCIUM+D PO)       magnesium chloride ER 64 MG DR tablet Take 1 tablet by mouth Daily.      Milk Thistle 175 MG capsule       rivaroxaban (XARELTO) 20 MG tablet Take 1 tablet by mouth Daily. 14 tablet 3    TART CHERRY PO Take 465 mg by mouth.      TURMERIC PO Take  by mouth.      Zinc Sulfate (ZINC 15 PO) Take  by mouth.      predniSONE (DELTASONE) 20 MG tablet Take 1 tablet by mouth 2 (Two) Times a Day. (Patient not taking: Reported on 9/12/2024) 6 tablet 0     No current facility-administered medications for this visit.         The following portions of the patient's history were reviewed and updated as appropriate: allergies, current medications, past family history, past medical history, past social history, past surgical history and problem list.    Review of Systems:   A 14 point review of systems was performed. All systems negative except pertinent positives/negative listed in HPI above    Physical Exam:   Vitals:    09/12/24 1021   Temp: 98.2 °F (36.8 °C)   TempSrc: Temporal   Weight: 79.9 kg (176 lb 3.2 oz)   Height: 160  "cm (63\")   PainSc:   2   PainLoc: Knee       General: A and O x 3, ASA, NAD    SCLERA:    Normal    DENTITION:   Normal    Left lower extremity examined gentle knee range of motion tolerated no severe tenderness about the knee or calf.  Motor and sensory distally.          Assessment/Plan:  Strain left gastroc, knee pain    Patient appears to be improving.  Recommend continue conservative treatment.  If symptoms are still present or not improved with next 4 to 6 weeks may consider advanced imaging such as MRI if warranted.  If there is any changes during interim she will let us know.  All questions answered.    "

## 2024-10-01 ENCOUNTER — OFFICE VISIT (OUTPATIENT)
Age: 79
End: 2024-10-01
Payer: MEDICARE

## 2024-10-01 VITALS
WEIGHT: 175 LBS | HEIGHT: 63 IN | SYSTOLIC BLOOD PRESSURE: 142 MMHG | BODY MASS INDEX: 31.01 KG/M2 | DIASTOLIC BLOOD PRESSURE: 82 MMHG | HEART RATE: 74 BPM

## 2024-10-01 DIAGNOSIS — I48.3 TYPICAL ATRIAL FLUTTER: Primary | ICD-10-CM

## 2024-10-01 PROCEDURE — 99213 OFFICE O/P EST LOW 20 MIN: CPT

## 2024-10-01 PROCEDURE — 93000 ELECTROCARDIOGRAM COMPLETE: CPT

## 2024-10-01 PROCEDURE — 3077F SYST BP >= 140 MM HG: CPT

## 2024-10-01 PROCEDURE — 3079F DIAST BP 80-89 MM HG: CPT

## 2024-10-01 NOTE — PROGRESS NOTES
Date of Office Visit: 10/01/2024  Encounter Provider: GABRIELE Caban  Place of Service: [facility] CARDIOLOGY  Patient Name: Minnie Ross  :1945    Chief Complaint   Patient presents with    typical atrial flutter   :     HPI: Minnie Ross is a 79 y.o. female who follows with Dr. Holt and Dr. Dos Santos. She has history of atrial flutter---s/p CTI ablation ().     She was diagnosed with AFL in PCP office in 2022. Noted elevated rate, no other symptoms.     Her HR improved with metoprolol and diltiazem. She discussed with Dr. Dos Santos and preferred ablation.     Patient had CTI ablation in .     She has not had any recurrent AFL or AF since ablation. AC continued due to elevated [id number].                         She presents today for follow up appt.     Since last visit she has done well.     Patient has not had any recurrent AFL since ablation.     No documented AF.     No complaints or concerns in office today.     EKG shows NSR.     On R-ban for aC.         Past Medical History:   Diagnosis Date    Abnormal Pap smear of cervix     Arrhythmia     Arthritis     Atrial fibrillation     Atrial flutter     Bursitis of hip     Cataract     H/O complete eye exam 2018    History of blood transfusion     History of bone density study 2018    Hypertension     IBS (irritable bowel syndrome)     Knee swelling     Numbness and tingling     Periarthritis of shoulder     Tremor     Visual impairment        Past Surgical History:   Procedure Laterality Date    APPENDECTOMY      CARDIAC ELECTROPHYSIOLOGY PROCEDURE N/A 2022    Procedure: Ablation atrial flutter;  Surgeon: Daniel Dos Santos MD;  Location: [facility] INVASIVE LOCATION;  Service: Cardiovascular;  Laterality: N/A;    CATARACT EXTRACTION      CHOLECYSTECTOMY      COLONOSCOPY      ENDOMETRIAL ABLATION      JOINT REPLACEMENT  2010    KNEE SURGERY Right 2008    Homero Gaston MD     MAMMO BILATERAL  2018    MOUTH SURGERY  2013    PAP SMEAR  2016    PARTIAL KNEE ARTHROPLASTY  2010       Social History     Socioeconomic History    Marital status:      Spouse name: Julián    Number of children: 1   Tobacco Use    Smoking status: Former     Current packs/day: 0.00     Average packs/day: 1 pack/day for 25.0 years (25.0 ttl pk-yrs)     Types: Cigarettes     Start date: 1982     Quit date: 2007     Years since quittin.7     Passive exposure: Past    Smokeless tobacco: Never    Tobacco comments:     Caffeine - approximately 2 diet cokes a day   Vaping Use    Vaping status: Never Used   Substance and Sexual Activity    Alcohol use: Yes     Alcohol/week: 7.0 standard drinks of alcohol     Types: 7 Glasses of wine per week     Comment: 3-4 week    Drug use: No    Sexual activity: Not Currently     Partners: Male     Birth control/protection: Post-menopausal       Family History   Problem Relation Age of Onset    Hypertension Mother     Heart disease Mother     Arthritis Mother     Deep vein thrombosis Brother     Vision loss Father     Hyperlipidemia Sister        Review of Systems   Constitutional: Negative for chills, fever and malaise/fatigue.   Cardiovascular:  Negative for chest pain, dyspnea on exertion, leg swelling, near-syncope, orthopnea, palpitations, paroxysmal nocturnal dyspnea and syncope.   Respiratory:  Negative for cough and shortness of breath.    Hematologic/Lymphatic: Negative.    Musculoskeletal:  Negative for joint pain, joint swelling and myalgias.   Gastrointestinal:  Negative for abdominal pain, diarrhea, melena, nausea and vomiting.   Genitourinary:  Negative for frequency and hematuria.   Neurological:  Negative for light-headedness, numbness, paresthesias and seizures.   Allergic/Immunologic: Negative.    All other systems reviewed and are negative.      No Known Allergies      Current Outpatient Medications:     amLODIPine-benazepril (LOTREL 5-20)  "5-20 MG per capsule, Take 1 capsule by mouth Daily., Disp: 90 capsule, Rfl: 3    ascorbic acid (VITAMIN C) 1000 MG tablet, , Disp: , Rfl:     Calcium-Magnesium-Vitamin D (CITRACAL CALCIUM+D PO), , Disp: , Rfl:     Milk Thistle 175 MG capsule, , Disp: , Rfl:     rivaroxaban (XARELTO) 20 MG tablet, Take 1 tablet by mouth Daily., Disp: 14 tablet, Rfl: 3    TART CHERRY PO, Take 465 mg by mouth., Disp: , Rfl:     TURMERIC PO, Take  by mouth., Disp: , Rfl:       Objective:     Vitals:    10/01/24 0927   BP: 142/82   Pulse: 74   Weight: 79.4 kg (175 lb)   Height: 160 cm (63\")     Body mass index is 31 kg/m².    PHYSICAL EXAM:    Vitals Reviewed.   General Appearance: No acute distress, well developed and well nourished.   Eyes: Conjunctiva and lids: No erythema, swelling, or discharge. Sclera non-icteric.   HENT: Atraumatic, normocephalic. External eyes, ears, and nose normal.   Respiratory: No signs of respiratory distress. Respiration rhythm and depth normal.   Clear to auscultation. No rales, crackles, rhonchi, or wheezing auscultated.   Cardiovascular:  Heart Rate and Rhythm: Normal, Heart Sounds: Normal S1 and S2. No S3 or S4 noted.  Gastrointestinal:  Abdomen soft, non-distended, non-tender.   Musculoskeletal: Normal movement of extremities  Skin: Warm and dry.   Psychiatric: Patient alert and oriented to person, place, and time. Speech and behavior appropriate. Normal mood and affect.       ECG 12 Lead    Date/Time: 10/1/2024 11:37 AM  Performed by: Liang Henson APRN    Authorized by: Liang Henson APRN  Comparison: compared with previous ECG   Similar to previous ECG  Rhythm: sinus rhythm            Assessment:       Diagnosis Plan   1. Typical atrial flutter               Plan:   Typical AFL--- she has done great since ablation in 2022. No recurrent AFL or documented AF. Remains on Rban for elevated [id number]. Continue current treatment.             Follow up in one year.         As always, it has been a " pleasure to participate in your patient's care.      Sincerely,         GABRIELE Caban

## 2025-01-20 ENCOUNTER — OFFICE VISIT (OUTPATIENT)
Dept: FAMILY MEDICINE CLINIC | Facility: CLINIC | Age: 80
End: 2025-01-20
Payer: MEDICARE

## 2025-01-20 VITALS
OXYGEN SATURATION: 98 % | HEART RATE: 80 BPM | TEMPERATURE: 98.3 F | DIASTOLIC BLOOD PRESSURE: 78 MMHG | HEIGHT: 63 IN | BODY MASS INDEX: 30.48 KG/M2 | SYSTOLIC BLOOD PRESSURE: 124 MMHG | RESPIRATION RATE: 16 BRPM | WEIGHT: 172 LBS

## 2025-01-20 DIAGNOSIS — J06.9 UPPER RESPIRATORY TRACT INFECTION DUE TO COVID-19 VIRUS: Primary | ICD-10-CM

## 2025-01-20 DIAGNOSIS — U07.1 UPPER RESPIRATORY TRACT INFECTION DUE TO COVID-19 VIRUS: Primary | ICD-10-CM

## 2025-01-20 PROCEDURE — 1160F RVW MEDS BY RX/DR IN RCRD: CPT | Performed by: FAMILY MEDICINE

## 2025-01-20 PROCEDURE — 1159F MED LIST DOCD IN RCRD: CPT | Performed by: FAMILY MEDICINE

## 2025-01-20 PROCEDURE — 1125F AMNT PAIN NOTED PAIN PRSNT: CPT | Performed by: FAMILY MEDICINE

## 2025-01-20 PROCEDURE — 3074F SYST BP LT 130 MM HG: CPT | Performed by: FAMILY MEDICINE

## 2025-01-20 PROCEDURE — 99214 OFFICE O/P EST MOD 30 MIN: CPT | Performed by: FAMILY MEDICINE

## 2025-01-20 PROCEDURE — 3078F DIAST BP <80 MM HG: CPT | Performed by: FAMILY MEDICINE

## 2025-01-20 RX ORDER — AZITHROMYCIN 250 MG/1
TABLET, FILM COATED ORAL
Qty: 6 TABLET | Refills: 0 | Status: SHIPPED | OUTPATIENT
Start: 2025-01-20

## 2025-01-20 RX ORDER — MONTELUKAST SODIUM 10 MG/1
10 TABLET ORAL NIGHTLY
Qty: 10 TABLET | Refills: 0 | Status: SHIPPED | OUTPATIENT
Start: 2025-01-20 | End: 2025-01-30

## 2025-01-20 NOTE — PROGRESS NOTES
Subjective   Minnie Ross is a 79 y.o. female.     CC: [facility] f/u    Patient returns today after being seen in the [facility] urgent care on 1/16/2025 with this note:    History of Present Illness  78 yo female presents with coughing, congestion, sneezing since 2 days ago.  had covid 2 weeks ago. Had fever and chills.     SARS Antigen: Detected    DX: Covid    Patient was placed on Paxlovid.    Today, patient reports the [facility] held her Xarelto due to the Paxlovid. Pt not tolerating the Paxlovid very well whatsoever due to side effects.      The following portions of the patient's history were reviewed and updated as appropriate: allergies, current medications, past family history, past medical history, past social history, past surgical history, and problem list.    Review of Systems   Constitutional:  Negative for activity change, chills and fever.   Respiratory:  Negative for cough.    Cardiovascular:  Negative for chest pain.   Neurological:  Positive for dizziness.   Psychiatric/Behavioral:  Negative for dysphoric mood.        Objective   Physical Exam  Vitals and nursing note reviewed.   Constitutional:       General: She is not in acute distress.     Appearance: She is well-developed.   Cardiovascular:      Rate and Rhythm: Normal rate and regular rhythm.   Pulmonary:      Effort: Pulmonary effort is normal.      Breath sounds: Normal breath sounds.   Neurological:      Mental Status: She is alert and oriented to person, place, and time.   Psychiatric:         Behavior: Behavior normal.         Thought Content: Thought content normal.     Urgent care center notes reviewed by me at today's visit.    Assessment & Plan   Diagnoses and all orders for this visit:    1. Upper respiratory tract infection due to COVID-19 virus (Primary)  -     azithromycin (Zithromax Z-Gadiel) 250 MG tablet; Take 2 tablets the first day, then 1 tablet daily for 4 days.  Dispense: 6 tablet; Refill: 0  -     montelukast (Singulair) 10 MG  tablet; Take 1 tablet by mouth Every Night for 10 days.  Dispense: 10 tablet; Refill: 0    Script to CC at 32mg QD x 7 days sent.

## 2025-01-29 NOTE — PROGRESS NOTES
"  Chief Complaint:   Chief Complaint   Patient presents with    Hypertension     Med refill   [employer] wellness not due        Minnie Ross 79 y.o. female who presents today for Medical Management of the below listed issues. She  has a problem list of   Patient Active Problem List   Diagnosis    Hypertension    Eczema    Primary osteoarthritis    Chest pain    Dyspnea on exertion    Diaphoresis    Pulmonary nodule    Bruising    Atrial flutter   .  Since the last visit, She has overall felt well since getting over Covid with treatments rendered.  she has been compliant with   Current Outpatient Medications:     amLODIPine-benazepril (LOTREL 5-20) 5-20 MG per capsule, Take 1 capsule by mouth Daily., Disp: 90 capsule, Rfl: 3    ascorbic acid (VITAMIN C) 1000 MG tablet, , Disp: , Rfl:     azithromycin (Zithromax Z-Gadiel) 250 MG tablet, Take 2 tablets the first day, then 1 tablet daily for 4 days., Disp: 6 tablet, Rfl: 0    Calcium-Magnesium-Vitamin D (CITRACAL CALCIUM+D PO), , Disp: , Rfl:     Milk Thistle 175 MG capsule, , Disp: , Rfl:     montelukast (Singulair) 10 MG tablet, Take 1 tablet by mouth Every Night for 10 days., Disp: 10 tablet, Rfl: 0    rivaroxaban (XARELTO) 20 MG tablet, Take 1 tablet by mouth Daily., Disp: 14 tablet, Rfl: 0    TART CHERRY PO, Take 465 mg by mouth., Disp: , Rfl:     TURMERIC PO, Take  by mouth., Disp: , Rfl: .  She denies medication side effects.    All of the other chronic condition(s) listed above are stable w/o issues.    /76   Pulse 80   Temp 97.7 °F (36.5 °C) (Oral)   Resp 16   Ht 160 cm (63\")   Wt 78 kg (172 lb)   LMP  (LMP Unknown)   SpO2 99%   BMI 30.47 kg/m²     Results for orders placed or performed during the hospital encounter of 01/16/25   Covid-19 + Flu A&B AG, Veritor ([id number])    Collection Time: 01/16/25  9:17 AM    Specimen: Swab   Result Value Ref Range    SARS Antigen Detected (A) Not Detected, Presumptive Negative    Influenza A Antigen GABY " Not Detected Not Detected    Influenza B Antigen GABY Not Detected Not Detected    Internal Control Passed Passed    Lot Number [id number]     Expiration Date 11/14/2025              The following portions of the patient's history were reviewed and updated as appropriate: allergies, current medications, past family history, past medical history, past social history, past surgical history, and problem list.    Review of Systems   Constitutional:  Negative for activity change, chills and fever.   Respiratory:  Negative for cough.    Cardiovascular:  Negative for chest pain.   Psychiatric/Behavioral:  Negative for dysphoric mood.        Objective             Physical Exam  Vitals and nursing note reviewed.   Constitutional:       General: She is not in acute distress.     Appearance: She is well-developed.   Cardiovascular:      Rate and Rhythm: Normal rate and regular rhythm.   Pulmonary:      Effort: Pulmonary effort is normal.      Breath sounds: Normal breath sounds.   Neurological:      Mental Status: She is alert and oriented to person, place, and time.   Psychiatric:         Behavior: Behavior normal.         Thought Content: Thought content normal.             Diagnoses and all orders for this visit:    1. Primary hypertension (Primary)  -     amLODIPine-benazepril (LOTREL 5-20) 5-20 MG per capsule; Take 1 capsule by mouth Daily.  Dispense: 90 capsule; Refill: 3  -     Comprehensive metabolic panel  -     Lipid panel  -     CBC and Differential  -     TSH

## 2025-01-30 ENCOUNTER — OFFICE VISIT (OUTPATIENT)
Dept: FAMILY MEDICINE CLINIC | Facility: CLINIC | Age: 80
End: 2025-01-30
Payer: MEDICARE

## 2025-01-30 VITALS
WEIGHT: 172 LBS | TEMPERATURE: 97.7 F | BODY MASS INDEX: 30.48 KG/M2 | SYSTOLIC BLOOD PRESSURE: 132 MMHG | HEIGHT: 63 IN | HEART RATE: 80 BPM | RESPIRATION RATE: 16 BRPM | DIASTOLIC BLOOD PRESSURE: 76 MMHG | OXYGEN SATURATION: 99 %

## 2025-01-30 DIAGNOSIS — I10 PRIMARY HYPERTENSION: Primary | Chronic | ICD-10-CM

## 2025-01-30 RX ORDER — AMLODIPINE AND BENAZEPRIL HYDROCHLORIDE 5; 20 MG/1; MG/1
1 CAPSULE ORAL DAILY
Qty: 90 CAPSULE | Refills: 3 | Status: SHIPPED | OUTPATIENT
Start: 2025-01-30

## 2025-02-04 ENCOUNTER — PATIENT MESSAGE (OUTPATIENT)
Dept: FAMILY MEDICINE CLINIC | Facility: CLINIC | Age: 80
End: 2025-02-04
Payer: MEDICARE

## 2025-02-04 DIAGNOSIS — M54.9 BACK PAIN, UNSPECIFIED BACK LOCATION, UNSPECIFIED BACK PAIN LATERALITY, UNSPECIFIED CHRONICITY: Primary | ICD-10-CM

## 2025-02-04 LAB
ALBUMIN SERPL-MCNC: 4.4 G/DL (ref 3.5–5.2)
ALBUMIN/GLOB SERPL: 1.4 G/DL
ALP SERPL-CCNC: 114 U/L (ref 39–117)
ALT SERPL-CCNC: 31 U/L (ref 1–33)
AST SERPL-CCNC: 32 U/L (ref 1–32)
BASOPHILS # BLD AUTO: 0.06 10*3/MM3 (ref 0–0.2)
BASOPHILS NFR BLD AUTO: 0.8 % (ref 0–1.5)
BILIRUB SERPL-MCNC: 0.4 MG/DL (ref 0–1.2)
BUN SERPL-MCNC: 12 MG/DL (ref 8–23)
BUN/CREAT SERPL: 15.2 (ref 7–25)
CALCIUM SERPL-MCNC: 9.9 MG/DL (ref 8.6–10.5)
CHLORIDE SERPL-SCNC: 105 MMOL/L (ref 98–107)
CHOLEST SERPL-MCNC: 171 MG/DL (ref 0–200)
CO2 SERPL-SCNC: 26 MMOL/L (ref 22–29)
CREAT SERPL-MCNC: 0.79 MG/DL (ref 0.57–1)
EGFRCR SERPLBLD CKD-EPI 2021: 77.2 ML/MIN/1.73
EOSINOPHIL # BLD AUTO: 0.1 10*3/MM3 (ref 0–0.4)
EOSINOPHIL NFR BLD AUTO: 1.3 % (ref 0.3–6.2)
ERYTHROCYTE [DISTWIDTH] IN BLOOD BY AUTOMATED COUNT: 11.7 % (ref 12.3–15.4)
GLOBULIN SER CALC-MCNC: 3.1 GM/DL
GLUCOSE SERPL-MCNC: 99 MG/DL (ref 65–99)
HCT VFR BLD AUTO: 43.3 % (ref 34–46.6)
HDLC SERPL-MCNC: 59 MG/DL (ref 40–60)
HGB BLD-MCNC: 15 G/DL (ref 12–15.9)
IMM GRANULOCYTES # BLD AUTO: 0.02 10*3/MM3 (ref 0–0.05)
IMM GRANULOCYTES NFR BLD AUTO: 0.3 % (ref 0–0.5)
LDLC SERPL CALC-MCNC: 96 MG/DL (ref 0–100)
LYMPHOCYTES # BLD AUTO: 2.77 10*3/MM3 (ref 0.7–3.1)
LYMPHOCYTES NFR BLD AUTO: 37.1 % (ref 19.6–45.3)
MCH RBC QN AUTO: 31.4 PG (ref 26.6–33)
MCHC RBC AUTO-ENTMCNC: 34.6 G/DL (ref 31.5–35.7)
MCV RBC AUTO: 90.8 FL (ref 79–97)
MONOCYTES # BLD AUTO: 0.6 10*3/MM3 (ref 0.1–0.9)
MONOCYTES NFR BLD AUTO: 8 % (ref 5–12)
NEUTROPHILS # BLD AUTO: 3.91 10*3/MM3 (ref 1.7–7)
NEUTROPHILS NFR BLD AUTO: 52.5 % (ref 42.7–76)
NRBC BLD AUTO-RTO: 0 /100 WBC (ref 0–0.2)
PLATELET # BLD AUTO: 464 10*3/MM3 (ref 140–450)
POTASSIUM SERPL-SCNC: 4.8 MMOL/L (ref 3.5–5.2)
PROT SERPL-MCNC: 7.5 G/DL (ref 6–8.5)
RBC # BLD AUTO: 4.77 10*6/MM3 (ref 3.77–5.28)
SODIUM SERPL-SCNC: 141 MMOL/L (ref 136–145)
TRIGL SERPL-MCNC: 85 MG/DL (ref 0–150)
TSH SERPL DL<=0.005 MIU/L-ACNC: 1.31 UIU/ML (ref 0.27–4.2)
VLDLC SERPL CALC-MCNC: 16 MG/DL (ref 5–40)
WBC # BLD AUTO: 7.46 10*3/MM3 (ref 3.4–10.8)

## 2025-04-30 NOTE — PROGRESS NOTES
Subjective   The ABCs of the Annual Wellness Visit  Medicare Wellness Visit      Minnie Ross is a 79 y.o. patient who presents for a Medicare Wellness Visit.    The following portions of the patient's history were reviewed and   updated as appropriate: allergies, current medications, past family history, past medical history, past social history, past surgical history, and problem list.    Compared to one year ago, the patient's physical   health is the same.  Compared to one year ago, the patient's mental   health is the same.    Recent Hospitalizations:  She was not admitted to the hospital during the last year.     Current Medical Providers:  Patient Care Team:  Andriy Nielson MD as PCP - General (Family Medicine)  Minnie Colindres MD as Consulting Physician (Dermatology)  Tammie Walsh APRN as Nurse Practitioner (Nurse Practitioner)  Blas Mai MD as Consulting Physician (Ophthalmology)  Andriy Hernandez MD (Internal Medicine)  Andriy Nielson MD as Referring Physician (Family Medicine)  Betito Ko MD as Consulting Physician (Hematology and Oncology)    Outpatient Medications Prior to Visit   Medication Sig Dispense Refill    amLODIPine-benazepril (LOTREL 5-20) 5-20 MG per capsule Take 1 capsule by mouth Daily. 90 capsule 3    ascorbic acid (VITAMIN C) 1000 MG tablet       Calcium-Magnesium-Vitamin D (CITRACAL CALCIUM+D PO)       Milk Thistle 175 MG capsule       rivaroxaban (XARELTO) 20 MG tablet Take 1 tablet by mouth Daily. 14 tablet 0    TART CHERRY PO Take 465 mg by mouth.      TURMERIC PO Take  by mouth.      azithromycin (Zithromax Z-Gadiel) 250 MG tablet Take 2 tablets the first day, then 1 tablet daily for 4 days. 6 tablet 0    montelukast (Singulair) 10 MG tablet Take 1 tablet by mouth Every Night for 10 days. 10 tablet 0     No facility-administered medications prior to visit.     No opioid medication identified on active medication list. I have reviewed chart for other potential   "high risk medication/s and harmful drug interactions in the elderly.      Aspirin is not on active medication list.  Aspirin use is not indicated based on review of current medical condition/s. Risk of harm outweighs potential benefits.  .    Patient Active Problem List   Diagnosis    Hypertension    Eczema    Primary osteoarthritis    Chest pain    Dyspnea on exertion    Diaphoresis    Pulmonary nodule    Bruising    Atrial flutter     Advance Care Planning Advance Directive is on file.  ACP discussion was held with the patient during this visit. Patient has an advance directive in EMR which is still valid.             Objective   Vitals:    25 1002   BP: 136/86  Comment: NO MEDS   Pulse: 73   Resp: 16   Temp: 97.9 °F (36.6 °C)   TempSrc: Oral   SpO2: 99%   Weight: 78 kg (172 lb)   Height: 160 cm (63\")   PainLoc: Comment: PAIN VARIES       Estimated body mass index is 30.47 kg/m² as calculated from the following:    Height as of this encounter: 160 cm (63\").    Weight as of this encounter: 78 kg (172 lb).                Does the patient have evidence of cognitive impairment? No  Lab Results   Component Value Date    CHLPL 171 2025    TRIG 85 2025    HDL 59 2025    LDL 96 2025    VLDL 16 2025                                                                                                Health  Risk Assessment    Smoking Status:  Social History     Tobacco Use   Smoking Status Former    Current packs/day: 0.00    Average packs/day: 1 pack/day for 25.0 years (25.0 ttl pk-yrs)    Types: Cigarettes    Start date: 1982    Quit date: 2007    Years since quittin.3    Passive exposure: Past   Smokeless Tobacco Never   Tobacco Comments    Caffeine - approximately 2 diet cokes a day     Alcohol Consumption:  Social History     Substance and Sexual Activity   Alcohol Use Yes    Alcohol/week: 7.0 standard drinks of alcohol    Types: 7 Glasses of wine per week    Comment: 3-4 week "       Fall Risk Screen  STEADI Fall Risk Assessment has not been completed.    Depression Screening   Little interest or pleasure in doing things? Not at all   Feeling down, depressed, or hopeless? Not at all   PHQ-2 Total Score 0      Health Habits and Functional and Cognitive Screenin/1/2025    10:00 AM   Functional & Cognitive Status   Do you have difficulty preparing food and eating? No   Do you have difficulty bathing yourself, getting dressed or grooming yourself? No   Do you have difficulty using the toilet? No   Do you have difficulty moving around from place to place? No   Do you have trouble with steps or getting out of a bed or a chair? No   Current Diet Well Balanced Diet   Dental Exam Up to date   Eye Exam Up to date   Exercise (times per week) 3 times per week   Current Exercises Include Walking   Do you need help using the phone?  No   Are you deaf or do you have serious difficulty hearing?  No   Do you need help to go to places out of walking distance? No   Do you need help shopping? No   Do you need help preparing meals?  No   Do you need help with housework?  No   Do you need help with laundry? No   Do you need help taking your medications? No   Do you need help managing money? No   Do you ever drive or ride in a car without wearing a seat belt? No   Have you felt unusual stress, anger or loneliness in the last month? No   Who do you live with? Spouse   If you need help, do you have trouble finding someone available to you? No   Have you been bothered in the last four weeks by sexual problems? No   Do you have difficulty concentrating, remembering or making decisions? No           Age-appropriate Screening Schedule:  Refer to the list below for future screening recommendations based on patient's age, sex and/or medical conditions. Orders for these recommended tests are listed in the plan section. The patient has been provided with a written plan.    Health Maintenance List  Health  Maintenance   Topic Date Due    DXA SCAN  11/03/2024    COVID-19 Vaccine (3 - 2024-25 season) 05/15/2025 (Originally 9/1/2024)    PAP SMEAR  06/23/2025 (Originally 1/15/2021)    TDAP/TD VACCINES (2 - Td or Tdap) 11/25/2025 (Originally 5/1/2022)    RSV Vaccine - Adults (1 - 1-dose 75+ series) 05/01/2026 (Originally 7/25/2022)    INFLUENZA VACCINE  07/01/2025    ANNUAL WELLNESS VISIT  05/01/2026    COLORECTAL CANCER SCREENING  12/01/2030    Pneumococcal Vaccine 50+  Completed    ZOSTER VACCINE  Completed    HEPATITIS C SCREENING  Discontinued    MAMMOGRAM  Discontinued                                                                                                                                                CMS Preventative Services Quick Reference  Risk Factors Identified During Encounter  None Identified    The above risks/problems have been discussed with the patient.  Pertinent information has been shared with the patient in the After Visit Summary.  An After Visit Summary and PPPS were made available to the patient.    Follow Up:   Next Medicare Wellness visit to be scheduled in 1 year.         Additional E&M Note during same encounter follows:  Patient has additional, significant, and separately identifiable condition(s)/problem(s) that require work above and beyond the Medicare Wellness Visit     Chief Complaint  medicare wellness (due), BILATERAL SHOULDER PAIN (SEVERAL MONTHS /), and BILATERAL HAND PAIN  (X 2 MONTHS)    Subjective   HPI  Minnie is also being seen today for additional medical problem/s.    Review of Systems   Constitutional:  Negative for chills and fever.   HENT:  Negative for congestion and sinus pressure.    Eyes:  Negative for visual disturbance.   Respiratory:  Negative for cough and shortness of breath.    Cardiovascular:  Negative for chest pain.   Gastrointestinal:  Negative for abdominal pain.   Genitourinary:  Negative for dysuria.   Skin:  Negative for rash.   Hematological:   "Negative for adenopathy.   Psychiatric/Behavioral:  Negative for dysphoric mood.               Objective   Vital Signs:  /86 Comment: NO MEDS  Pulse 73   Temp 97.9 °F (36.6 °C) (Oral)   Resp 16   Ht 160 cm (63\")   Wt 78 kg (172 lb)   SpO2 99%   BMI 30.47 kg/m²   Physical Exam  Vitals and nursing note reviewed.   Constitutional:       General: She is not in acute distress.     Appearance: She is well-developed.   Pulmonary:      Effort: Pulmonary effort is normal.   Neurological:      Mental Status: She is alert and oriented to person, place, and time.   Psychiatric:         Behavior: Behavior normal.         Thought Content: Thought content normal.                    Assessment and Plan Additional age appropriate preventative wellness advice topics were discussed during today's preventative wellness exam(some topics already addressed during AWV portion of the note above):    Physical Activity: Advised cardiovascular activity 150 minutes per week as tolerated. (example brisk walk for 30 minutes, 5 days a week).     Nutrition: Discussed nutrition plan with patient. Information shared in after visit summary. Goal is for a well balanced diet to enhance overall health.     Encounter for subsequent annual wellness visit (AWV) in Medicare patient         Primary osteoarthritis, unspecified site               I spent 15 minutes caring for Minnie on this date of service. This time includes time spent by me in the following activities:preparing for the visit, performing a medically appropriate examination and/or evaluation , ordering medications, tests, or procedures, and documenting information in the medical record  Follow Up   Return in about 1 year (around 5/1/2026) for Medicare Wellness Visit.  Patient was given instructions and counseling regarding her condition or for health maintenance advice. Please see specific information pulled into the AVS if appropriate.    "

## 2025-05-01 ENCOUNTER — OFFICE VISIT (OUTPATIENT)
Dept: FAMILY MEDICINE CLINIC | Facility: CLINIC | Age: 80
End: 2025-05-01
Payer: MEDICARE

## 2025-05-01 VITALS
TEMPERATURE: 97.9 F | WEIGHT: 172 LBS | HEIGHT: 63 IN | OXYGEN SATURATION: 99 % | HEART RATE: 73 BPM | SYSTOLIC BLOOD PRESSURE: 136 MMHG | RESPIRATION RATE: 16 BRPM | BODY MASS INDEX: 30.48 KG/M2 | DIASTOLIC BLOOD PRESSURE: 86 MMHG

## 2025-05-01 DIAGNOSIS — Z00.00 ENCOUNTER FOR SUBSEQUENT ANNUAL WELLNESS VISIT (AWV) IN MEDICARE PATIENT: Primary | ICD-10-CM

## 2025-05-01 DIAGNOSIS — M19.91 PRIMARY OSTEOARTHRITIS, UNSPECIFIED SITE: ICD-10-CM

## 2025-05-01 PROCEDURE — 3075F SYST BP GE 130 - 139MM HG: CPT | Performed by: FAMILY MEDICINE

## 2025-05-01 PROCEDURE — 1125F AMNT PAIN NOTED PAIN PRSNT: CPT | Performed by: FAMILY MEDICINE

## 2025-05-01 PROCEDURE — 1160F RVW MEDS BY RX/DR IN RCRD: CPT | Performed by: FAMILY MEDICINE

## 2025-05-01 PROCEDURE — 1159F MED LIST DOCD IN RCRD: CPT | Performed by: FAMILY MEDICINE

## 2025-05-01 PROCEDURE — G0439 PPPS, SUBSEQ VISIT: HCPCS | Performed by: FAMILY MEDICINE

## 2025-05-01 PROCEDURE — 3079F DIAST BP 80-89 MM HG: CPT | Performed by: FAMILY MEDICINE

## 2025-05-01 PROCEDURE — 1170F FXNL STATUS ASSESSED: CPT | Performed by: FAMILY MEDICINE

## 2025-05-01 NOTE — PATIENT INSTRUCTIONS
Medicare Wellness  Personal Prevention Plan of Service     Date of Office Visit:    Encounter Provider:  Andriy Nielson MD  Place of Service:  [facility]  Patient Name: Minnie Ross  :  1945    As part of the Medicare Wellness portion of your visit today, we are providing you with this personalized preventive plan of services (PPPS). This plan is based upon recommendations of the [place] Preventive Services Task Force (USPSTF) and the Advisory Committee on Immunization Practices (ACIP).    This lists the preventive care services that should be considered, and provides dates of when you are due. Items listed as completed are up-to-date and do not require any further intervention.    Health Maintenance   Topic Date Due    DXA SCAN  2024    COVID-19 Vaccine (3 - 2024- season) 05/15/2025 (Originally 2024)    PAP SMEAR  2025 (Originally 1/15/2021)    TDAP/TD VACCINES (2 - Td or Tdap) 2025 (Originally 2022)    RSV Vaccine - Adults (1 - 1-dose 75+ series) 2026 (Originally 2022)    INFLUENZA VACCINE  2025    ANNUAL WELLNESS VISIT  2026    COLORECTAL CANCER SCREENING  2030    Pneumococcal Vaccine 50+  Completed    ZOSTER VACCINE  Completed    HEPATITIS C SCREENING  Discontinued    MAMMOGRAM  Discontinued       No orders of the defined types were placed in this encounter.      Return in about 1 year (around 2026) for Medicare Wellness Visit.

## 2025-05-29 RX ORDER — CYCLOBENZAPRINE HCL 10 MG
10 TABLET ORAL DAILY PRN
Qty: 30 TABLET | Refills: 2 | Status: SHIPPED | OUTPATIENT
Start: 2025-05-29

## 2025-06-16 ENCOUNTER — TELEPHONE (OUTPATIENT)
Dept: FAMILY MEDICINE CLINIC | Facility: CLINIC | Age: 80
End: 2025-06-16
Payer: MEDICARE

## 2025-06-16 ENCOUNTER — APPOINTMENT (OUTPATIENT)
Dept: GENERAL RADIOLOGY | Facility: HOSPITAL | Age: 80
End: 2025-06-16
Payer: MEDICARE

## 2025-06-16 ENCOUNTER — HOSPITAL ENCOUNTER (OUTPATIENT)
Facility: HOSPITAL | Age: 80
Setting detail: OBSERVATION
Discharge: HOME | End: 2025-06-17
Attending: EMERGENCY MEDICINE | Admitting: EMERGENCY MEDICINE
Payer: MEDICARE

## 2025-06-16 DIAGNOSIS — R07.9 NONSPECIFIC CHEST PAIN: Primary | ICD-10-CM

## 2025-06-16 LAB
ALBUMIN SERPL-MCNC: 4.4 G/DL (ref 3.5–5.2)
ALBUMIN/GLOB SERPL: 1.3 G/DL
ALP SERPL-CCNC: 126 U/L (ref 39–117)
ALT SERPL W P-5'-P-CCNC: 24 U/L (ref 1–33)
ANION GAP SERPL CALCULATED.3IONS-SCNC: 12.5 MMOL/L (ref 5–15)
AST SERPL-CCNC: 24 U/L (ref 1–32)
BASOPHILS # BLD AUTO: 0.05 10*3/MM3 (ref 0–0.2)
BASOPHILS NFR BLD AUTO: 0.5 % (ref 0–1.5)
BILIRUB SERPL-MCNC: 0.4 MG/DL (ref 0–1.2)
BUN SERPL-MCNC: 14 MG/DL (ref 8–23)
BUN/CREAT SERPL: 17.1 (ref 7–25)
CALCIUM SPEC-SCNC: 9.8 MG/DL (ref 8.6–10.5)
CHLORIDE SERPL-SCNC: 104 MMOL/L (ref 98–107)
CO2 SERPL-SCNC: 24.5 MMOL/L (ref 22–29)
CREAT SERPL-MCNC: 0.82 MG/DL (ref 0.57–1)
DEPRECATED RDW RBC AUTO: 39.9 FL (ref 37–54)
EGFRCR SERPLBLD CKD-EPI 2021: 73.8 ML/MIN/1.73
EOSINOPHIL # BLD AUTO: 0.06 10*3/MM3 (ref 0–0.4)
EOSINOPHIL NFR BLD AUTO: 0.6 % (ref 0.3–6.2)
ERYTHROCYTE [DISTWIDTH] IN BLOOD BY AUTOMATED COUNT: 11.7 % (ref 12.3–15.4)
GEN 5 1HR TROPONIN T REFLEX: 8 NG/L
GLOBULIN UR ELPH-MCNC: 3.5 GM/DL
GLUCOSE SERPL-MCNC: 167 MG/DL (ref 65–99)
HCT VFR BLD AUTO: 44.1 % (ref 34–46.6)
HGB BLD-MCNC: 14.4 G/DL (ref 12–15.9)
HOLD SPECIMEN: NORMAL
HOLD SPECIMEN: NORMAL
IMM GRANULOCYTES # BLD AUTO: 0.04 10*3/MM3 (ref 0–0.05)
IMM GRANULOCYTES NFR BLD AUTO: 0.4 % (ref 0–0.5)
LYMPHOCYTES # BLD AUTO: 2.43 10*3/MM3 (ref 0.7–3.1)
LYMPHOCYTES NFR BLD AUTO: 24.8 % (ref 19.6–45.3)
MCH RBC QN AUTO: 30.5 PG (ref 26.6–33)
MCHC RBC AUTO-ENTMCNC: 32.7 G/DL (ref 31.5–35.7)
MCV RBC AUTO: 93.4 FL (ref 79–97)
MONOCYTES # BLD AUTO: 0.47 10*3/MM3 (ref 0.1–0.9)
MONOCYTES NFR BLD AUTO: 4.8 % (ref 5–12)
NEUTROPHILS NFR BLD AUTO: 6.74 10*3/MM3 (ref 1.7–7)
NEUTROPHILS NFR BLD AUTO: 68.9 % (ref 42.7–76)
NRBC BLD AUTO-RTO: 0 /100 WBC (ref 0–0.2)
PLATELET # BLD AUTO: 404 10*3/MM3 (ref 140–450)
PMV BLD AUTO: 8.3 FL (ref 6–12)
POTASSIUM SERPL-SCNC: 4.6 MMOL/L (ref 3.5–5.2)
PROT SERPL-MCNC: 7.9 G/DL (ref 6–8.5)
QT INTERVAL: 381 MS
QT INTERVAL: 388 MS
QTC INTERVAL: 417 MS
QTC INTERVAL: 418 MS
RBC # BLD AUTO: 4.72 10*6/MM3 (ref 3.77–5.28)
SODIUM SERPL-SCNC: 141 MMOL/L (ref 136–145)
TROPONIN T NUMERIC DELTA: -1 NG/L
TROPONIN T SERPL HS-MCNC: 9 NG/L
WBC NRBC COR # BLD AUTO: 9.79 10*3/MM3 (ref 3.4–10.8)
WHOLE BLOOD HOLD COAG: NORMAL
WHOLE BLOOD HOLD SPECIMEN: NORMAL

## 2025-06-16 PROCEDURE — 84484 ASSAY OF TROPONIN QUANT: CPT | Performed by: EMERGENCY MEDICINE

## 2025-06-16 PROCEDURE — 84484 ASSAY OF TROPONIN QUANT: CPT

## 2025-06-16 PROCEDURE — 93005 ELECTROCARDIOGRAM TRACING: CPT | Performed by: EMERGENCY MEDICINE

## 2025-06-16 PROCEDURE — 93010 ELECTROCARDIOGRAM REPORT: CPT | Performed by: INTERNAL MEDICINE

## 2025-06-16 PROCEDURE — G0378 HOSPITAL OBSERVATION PER HR: HCPCS

## 2025-06-16 PROCEDURE — 85025 COMPLETE CBC W/AUTO DIFF WBC: CPT

## 2025-06-16 PROCEDURE — 36415 COLL VENOUS BLD VENIPUNCTURE: CPT

## 2025-06-16 PROCEDURE — 71045 X-RAY EXAM CHEST 1 VIEW: CPT

## 2025-06-16 PROCEDURE — 93010 ELECTROCARDIOGRAM REPORT: CPT | Performed by: STUDENT IN AN ORGANIZED HEALTH CARE EDUCATION/TRAINING PROGRAM

## 2025-06-16 PROCEDURE — 80053 COMPREHEN METABOLIC PANEL: CPT

## 2025-06-16 PROCEDURE — 99285 EMERGENCY DEPT VISIT HI MDM: CPT

## 2025-06-16 PROCEDURE — 93005 ELECTROCARDIOGRAM TRACING: CPT

## 2025-06-16 RX ORDER — NITROGLYCERIN 0.4 MG/1
0.4 TABLET SUBLINGUAL
Status: DISCONTINUED | OUTPATIENT
Start: 2025-06-16 | End: 2025-06-17 | Stop reason: HOSPADM

## 2025-06-16 RX ORDER — AMLODIPINE BESYLATE 5 MG/1
5 TABLET ORAL
Status: DISCONTINUED | OUTPATIENT
Start: 2025-06-17 | End: 2025-06-17 | Stop reason: HOSPADM

## 2025-06-16 RX ORDER — SODIUM CHLORIDE 9 MG/ML
40 INJECTION, SOLUTION INTRAVENOUS AS NEEDED
Status: DISCONTINUED | OUTPATIENT
Start: 2025-06-16 | End: 2025-06-17 | Stop reason: HOSPADM

## 2025-06-16 RX ORDER — BISACODYL 10 MG
10 SUPPOSITORY, RECTAL RECTAL DAILY PRN
Status: DISCONTINUED | OUTPATIENT
Start: 2025-06-16 | End: 2025-06-17 | Stop reason: HOSPADM

## 2025-06-16 RX ORDER — SODIUM CHLORIDE 0.9 % (FLUSH) 0.9 %
10 SYRINGE (ML) INJECTION AS NEEDED
Status: DISCONTINUED | OUTPATIENT
Start: 2025-06-16 | End: 2025-06-17 | Stop reason: HOSPADM

## 2025-06-16 RX ORDER — SODIUM CHLORIDE 0.9 % (FLUSH) 0.9 %
10 SYRINGE (ML) INJECTION EVERY 12 HOURS SCHEDULED
Status: DISCONTINUED | OUTPATIENT
Start: 2025-06-16 | End: 2025-06-17 | Stop reason: HOSPADM

## 2025-06-16 RX ORDER — POLYETHYLENE GLYCOL 3350 17 G/17G
17 POWDER, FOR SOLUTION ORAL DAILY PRN
Status: DISCONTINUED | OUTPATIENT
Start: 2025-06-16 | End: 2025-06-17 | Stop reason: HOSPADM

## 2025-06-16 RX ORDER — LISINOPRIL 20 MG/1
20 TABLET ORAL
Status: DISCONTINUED | OUTPATIENT
Start: 2025-06-17 | End: 2025-06-17 | Stop reason: HOSPADM

## 2025-06-16 RX ORDER — CYCLOBENZAPRINE HCL 10 MG
10 TABLET ORAL DAILY PRN
Status: DISCONTINUED | OUTPATIENT
Start: 2025-06-16 | End: 2025-06-17 | Stop reason: HOSPADM

## 2025-06-16 RX ORDER — AMOXICILLIN 250 MG
2 CAPSULE ORAL 2 TIMES DAILY
Status: DISCONTINUED | OUTPATIENT
Start: 2025-06-16 | End: 2025-06-17 | Stop reason: HOSPADM

## 2025-06-16 RX ORDER — ASPIRIN 325 MG
325 TABLET ORAL ONCE
Status: COMPLETED | OUTPATIENT
Start: 2025-06-16 | End: 2025-06-16

## 2025-06-16 RX ORDER — BISACODYL 5 MG/1
5 TABLET, DELAYED RELEASE ORAL DAILY PRN
Status: DISCONTINUED | OUTPATIENT
Start: 2025-06-16 | End: 2025-06-17 | Stop reason: HOSPADM

## 2025-06-16 RX ADMIN — Medication 10 ML: at 20:47

## 2025-06-16 RX ADMIN — ASPIRIN 325 MG ORAL TABLET 325 MG: 325 PILL ORAL at 15:25

## 2025-06-16 NOTE — TELEPHONE ENCOUNTER
Pt is currently at orthopedic for shoulder pain  Pt calling stating having tightness in chest along with elevated bp  Pt was told to go to ER for further evaluation.  Dr staton advised

## 2025-06-16 NOTE — ED PROVIDER NOTES
EMERGENCY DEPARTMENT ENCOUNTER  Room Number:  15/15  PCP: Andriy Nielson MD  Independent Historians: Patient      HPI:  Chief Complaint: had concerns including Chest Pain and Hypertension.     A complete HPI/ROS/PMH/PSH/SH/FH are unobtainable due to: None    Chronic or social conditions impacting patient care (Social Determinants of Health): None      Context: Minnie Ross is a 79 y.o. female with a medical history of hypertension, osteoarthritis, a flutter status post ablation with Dr. Bar - still on Xarelto, BMI greater than 30, who presents to the ED c/o acute chest tightness for the past 3 weeks, left-sided, intermittent.  It is not exertional and not associate with any other symptoms.  She was at the orthopedic office today where she had bilateral shoulder injections and came here for further evaluation.  The pressure is not becoming more frequent or more severe.      Review of prior external notes (non-ED) -and- Review of prior external test results outside of this encounter: I reviewed TTE from July 2022, LVEF appears to be 51 to 55%.  Systolic function low normal.  Indeterminate diastolic function.    I reviewed cardiology progress note August 2023 with Dr. Dos Santos.  Presents for a flutter, no detected a flutter or a fibrillation since ablation last year.  On Xarelto.    Prescription drug monitoring program review:         PAST MEDICAL HISTORY  Active Ambulatory Problems     Diagnosis Date Noted    Hypertension 09/21/2016    Eczema 09/21/2016    Primary osteoarthritis 05/07/2019    Chest pain 09/01/2019    Dyspnea on exertion 09/01/2019    Diaphoresis 09/01/2019    Pulmonary nodule 09/05/2019    Bruising 08/20/2020    Atrial flutter 07/21/2022     Resolved Ambulatory Problems     Diagnosis Date Noted    No Resolved Ambulatory Problems     Past Medical History:   Diagnosis Date    Abnormal Pap smear of cervix     Arrhythmia     Arthritis     Atrial fibrillation     Bursitis of hip     Cataract     H/O  complete eye exam 2018    History of blood transfusion     History of bone density study 2018    IBS (irritable bowel syndrome)     Knee swelling     Numbness and tingling     Periarthritis of shoulder     Tremor     Visual impairment          PAST SURGICAL HISTORY  Past Surgical History:   Procedure Laterality Date    APPENDECTOMY      CARDIAC ELECTROPHYSIOLOGY PROCEDURE N/A 2022    Procedure: Ablation atrial flutter;  Surgeon: Daniel Dos Santos MD;  Location: [facility] INVASIVE LOCATION;  Service: Cardiovascular;  Laterality: N/A;    CATARACT EXTRACTION      CHOLECYSTECTOMY      COLONOSCOPY      ENDOMETRIAL ABLATION      JOINT REPLACEMENT  2010    KNEE SURGERY Right 2008    Homero Gaston MD    MAMMO BILATERAL  2018    MOUTH SURGERY  2013    PAP SMEAR  2016    PARTIAL KNEE ARTHROPLASTY  2010         FAMILY HISTORY  Family History   Problem Relation Age of Onset    Hypertension Mother     Heart disease Mother     Arthritis Mother     Deep vein thrombosis Brother     Vision loss Father     Hyperlipidemia Sister          SOCIAL HISTORY  Social History     Socioeconomic History    Marital status:      Spouse name: Julián    Number of children: 1   Tobacco Use    Smoking status: Former     Current packs/day: 0.00     Average packs/day: 1 pack/day for 25.0 years (25.0 ttl pk-yrs)     Types: Cigarettes     Start date: 1982     Quit date: 2007     Years since quittin.4     Passive exposure: Past    Smokeless tobacco: Never    Tobacco comments:     Caffeine - approximately 2 diet cokes a day   Vaping Use    Vaping status: Never Used   Substance and Sexual Activity    Alcohol use: Yes     Alcohol/week: 7.0 standard drinks of alcohol     Types: 7 Glasses of wine per week     Comment: 3-4 week    Drug use: No    Sexual activity: Not Currently     Partners: Male     Birth control/protection: Post-menopausal       ALLERGIES  Patient has no known  allergies.      PHYSICAL EXAM    I have reviewed the triage vital signs and nursing notes.    ED Triage Vitals   Temp Heart Rate Resp BP SpO2   03/02/25 1448 03/02/25 1448 03/02/25 1448 03/02/25 1450 03/02/25 1448   97.4 °F (36.3 °C) 95 16 141/84 97 %      Temp src Heart Rate Source Patient Position BP Location FiO2 (%)   -- -- -- -- --              Physical Exam  GENERAL: alert, no acute distress  SKIN: Warm, dry  HENT: Normocephalic, atraumatic  EYES: no scleral icterus  CV: regular rhythm, regular rate  RESPIRATORY: normal effort, lungs clear  ABDOMEN: soft, nondistended, nontender  MUSCULOSKELETAL: no deformity  NEURO: alert, moves all extremities, follows commands        LAB RESULTS  Recent Results (from the past 24 hours)   ECG 12 Lead ED Triage Standing Order; Chest Pain    Collection Time: 06/16/25  1:02 PM   Result Value Ref Range    QT Interval 381 ms    QTC Interval 418 ms   Comprehensive Metabolic Panel    Collection Time: 06/16/25  1:06 PM    Specimen: Arm, Right; Blood   Result Value Ref Range    Glucose 167 (H) 65 - 99 mg/dL    BUN 14.0 8.0 - 23.0 mg/dL    Creatinine 0.82 0.57 - 1.00 mg/dL    Sodium 141 136 - 145 mmol/L    Potassium 4.6 3.5 - 5.2 mmol/L    Chloride 104 98 - 107 mmol/L    CO2 24.5 22.0 - 29.0 mmol/L    Calcium 9.8 8.6 - 10.5 mg/dL    Total Protein 7.9 6.0 - 8.5 g/dL    Albumin 4.4 3.5 - 5.2 g/dL    ALT (SGPT) 24 1 - 33 U/L    AST (SGOT) 24 1 - 32 U/L    Alkaline Phosphatase 126 (H) 39 - 117 U/L    Total Bilirubin 0.4 0.0 - 1.2 mg/dL    Globulin 3.5 gm/dL    A/G Ratio 1.3 g/dL    BUN/Creatinine Ratio 17.1 7.0 - 25.0    Anion Gap 12.5 5.0 - 15.0 mmol/L    eGFR 73.8 >60.0 mL/min/1.73   High Sensitivity Troponin T    Collection Time: 06/16/25  1:06 PM    Specimen: Arm, Right; Blood   Result Value Ref Range    HS Troponin T 9 <14 ng/L   Green Top (Gel)    Collection Time: 06/16/25  1:06 PM   Result Value Ref Range    Extra Tube Hold for add-ons.    Lavender Top    Collection Time: 06/16/25   1:06 PM   Result Value Ref Range    Extra Tube hold for add-on    Gold Top - SST    Collection Time: 06/16/25  1:06 PM   Result Value Ref Range    Extra Tube Hold for add-ons.    Light Blue Top    Collection Time: 06/16/25  1:06 PM   Result Value Ref Range    Extra Tube Hold for add-ons.    CBC Auto Differential    Collection Time: 06/16/25  1:06 PM    Specimen: Arm, Right; Blood   Result Value Ref Range    WBC 9.79 3.40 - 10.80 10*3/mm3    RBC 4.72 3.77 - 5.28 10*6/mm3    Hemoglobin 14.4 12.0 - 15.9 g/dL    Hematocrit 44.1 34.0 - 46.6 %    MCV 93.4 79.0 - 97.0 fL    MCH 30.5 26.6 - 33.0 pg    MCHC 32.7 31.5 - 35.7 g/dL    RDW 11.7 (L) 12.3 - 15.4 %    RDW-SD 39.9 37.0 - 54.0 fl    MPV 8.3 6.0 - 12.0 fL    Platelets 404 140 - 450 10*3/mm3    Neutrophil % 68.9 42.7 - 76.0 %    Lymphocyte % 24.8 19.6 - 45.3 %    Monocyte % 4.8 (L) 5.0 - 12.0 %    Eosinophil % 0.6 0.3 - 6.2 %    Basophil % 0.5 0.0 - 1.5 %    Immature Grans % 0.4 0.0 - 0.5 %    Neutrophils, Absolute 6.74 1.70 - 7.00 10*3/mm3    Lymphocytes, Absolute 2.43 0.70 - 3.10 10*3/mm3    Monocytes, Absolute 0.47 0.10 - 0.90 10*3/mm3    Eosinophils, Absolute 0.06 0.00 - 0.40 10*3/mm3    Basophils, Absolute 0.05 0.00 - 0.20 10*3/mm3    Immature Grans, Absolute 0.04 0.00 - 0.05 10*3/mm3    nRBC 0.0 0.0 - 0.2 /100 WBC   High Sensitivity Troponin T 1Hr    Collection Time: 06/16/25  3:05 PM    Specimen: Arm, Right; Blood   Result Value Ref Range    HS Troponin T 8 <14 ng/L    Troponin T Numeric Delta -1 Abnormal if >/=3 ng/L       RADIOLOGY  XR Chest 1 View  Result Date: 6/16/2025  XR CHEST 1 VW-  HISTORY: Female who is 79 years-old, chest pain  TECHNIQUE: Frontal view of the chest  COMPARISON: 9/1/2019  FINDINGS: The heart size is normal. Aorta is tortuous. Pulmonary vasculature is unremarkable. No focal pulmonary consolidation, pleural effusion, or pneumothorax. Old granulomatous disease is apparent. No acute osseous process.      No focal pulmonary consolidation.  Tortuous aorta. Follow-up as clinically indicated.  This report was finalized on 6/16/2025 1:37 PM by Dr. Ibrahima Estrella M.D on Workstation: [id number]          MEDICATIONS GIVEN IN ER  Medications   sodium chloride 0.9 % flush 10 mL (has no administration in time range)   nitroglycerin (NITROSTAT) SL tablet 0.4 mg (has no administration in time range)   sodium chloride 0.9 % flush 10 mL (has no administration in time range)   sodium chloride 0.9 % flush 10 mL (has no administration in time range)   sodium chloride 0.9 % infusion 40 mL (has no administration in time range)   sennosides-docusate (PERICOLACE) 8.6-50 MG per tablet 2 tablet (has no administration in time range)     And   polyethylene glycol (MIRALAX) packet 17 g (has no administration in time range)     And   bisacodyl (DULCOLAX) EC tablet 5 mg (has no administration in time range)     And   bisacodyl (DULCOLAX) suppository 10 mg (has no administration in time range)   aspirin tablet 325 mg (325 mg Oral Given 6/16/25 1525)         ORDERS PLACED DURING THIS VISIT:  Orders Placed This Encounter   Procedures    XR Chest 1 View    [place] Draw    Comprehensive Metabolic Panel    High Sensitivity Troponin T    CBC Auto Differential    High Sensitivity Troponin T 1Hr    Basic Metabolic Panel    CBC (No Diff)    Magnesium    NPO Diet NPO Type: Strict NPO    Undress & Gown    Continuous Pulse Oximetry    Vital Signs Every 15 Minutes Until Stable, Then Every 4 Hours    Telemetry - Place Orders & Notify Provider of Results When Patient Experiences Acute Chest Pain, Dysrhythmia or Respiratory Distress    May Be Off Telemetry for Tests    Intake & Output    Weigh Patient    Saline Lock & Maintain IV Access    Place Sequential Compression Device    Maintain Sequential Compression Device    Inpatient Cardiology Consult    Oxygen Therapy- Nasal Cannula; Titrate 1-6 LPM Per SpO2; 90 - 95%    ECG 12 Lead ED Triage Standing Order; Chest Pain    ECG 12 Lead ED Triage  Standing Order; Chest Pain    ECG 12 Lead Chest Pain    Adult Transthoracic Echo Complete W/ Cont if Necessary Per Protocol    Insert Peripheral IV    Insert Peripheral IV    Initiate Emergency Department Observation Status    CBC & Differential    Green Top (Gel)    Lavender Top    Gold Top - SST    Light Blue Top         OUTPATIENT MEDICATION MANAGEMENT:  Current Facility-Administered Medications Ordered in Epic   Medication Dose Route Frequency Provider Last Rate Last Admin    sennosides-docusate (PERICOLACE) 8.6-50 MG per tablet 2 tablet  2 tablet Oral BID Carey Banegas MD        And    polyethylene glycol (MIRALAX) packet 17 g  17 g Oral Daily PRN Carey Banegas MD        And    bisacodyl (DULCOLAX) EC tablet 5 mg  5 mg Oral Daily PRN Carey Banegas MD        And    bisacodyl (DULCOLAX) suppository 10 mg  10 mg Rectal Daily PRN Carey Banegas MD        nitroglycerin (NITROSTAT) SL tablet 0.4 mg  0.4 mg Sublingual Q5 Min PRN Carey Banegas MD        sodium chloride 0.9 % flush 10 mL  10 mL Intravenous PRN Jacob Porter MD        sodium chloride 0.9 % flush 10 mL  10 mL Intravenous Q12H Carey Banegas MD        sodium chloride 0.9 % flush 10 mL  10 mL Intravenous PRN Carey Banegas MD        sodium chloride 0.9 % infusion 40 mL  40 mL Intravenous PRN Carey Banegas MD         Current Outpatient Medications Ordered in Epic   Medication Sig Dispense Refill    amLODIPine-benazepril (LOTREL 5-20) 5-20 MG per capsule Take 1 capsule by mouth Daily. 90 capsule 3    ascorbic acid (VITAMIN C) 1000 MG tablet       Calcium-Magnesium-Vitamin D (CITRACAL CALCIUM+D PO)       cyclobenzaprine (FLEXERIL) 10 MG tablet Take 1 tablet by mouth Daily As Needed for Muscle Spasms. 30 tablet 2    Milk Thistle 175 MG capsule       rivaroxaban (XARELTO) 20 MG tablet Take 1 tablet by mouth Daily. 14 tablet 0    TART CHERRY PO Take 465 mg by mouth.      TURMERIC PO Take  by mouth.           PROCEDURES  Procedures            PROGRESS, DATA  ANALYSIS, CONSULTS, AND MEDICAL DECISION MAKING  All labs have been independently interpreted by me.  All radiology studies have been reviewed by me. All EKG's have been independently viewed and interpreted by me.  Discussion below represents my analysis of pertinent findings related to patient's condition, differential diagnosis, treatment plan and final disposition.    Differential diagnosis includes but is not limited to ACS, STEMI, NSTEMI, dysrhythmia, electrolyte or metabolic derangement, atypical chest pain, GERD, unstable angina.    Clinical Scores:         HEART Score: 3                               ED Course as of 06/16/25 1728   Mon Jun 16, 2025   1500 Patient presents ED for evaluation of 3 weeks left-sided chest pressure.  It is not exertional and is not associated with any other symptoms.  She was at the orthopedist today for bilateral shoulder injections for rotator cuff pain and came here after for further evaluation.  No recent illness or other complaints at this time.    On exam patient is well-appearing, no pretibial edema    Obtain basic labs, cardiac enzymes x 2, EKG and chest x-ray.  Discussed possible admission for cardiac evaluation.  Patient is agreeable with plan [DN]   1500 XR Chest 1 View  I reviewed patient's xray image(s), no focal infiltrate, interpreted by self  I reviewed the radiologist's interpretation of above image(s)   [DN]   1501 ECG 12 Lead ED Triage Standing Order; Chest Pain  Sinus rhythm, rate 72, normal axis and intervals, no significant ST changes, no STEMI    I compared EKG to prior on 10/1/2024.  No significant change.  EKGs interpreted by self [DN]   1501 HS Troponin T: 9 [DN]   1632 Troponin T Numeric Delta: -1 [DN]   1722 I discussed results with patient and family. Offered admission for observation cardiac eval versus outpatient follow-up. Patient prefers admission at this time [DN]   1725 I discussed patient with on-call NADIA for observation unit, agreeable plan to  admit [DN]      ED Course User Index  [DN] Minh Morelos MD             AS OF 17:28 EDT VITALS:    BP - 145/75  HR - 80  TEMP - 98.1 °F (36.7 °C) (Oral)  O2 SATS - 98%    COMPLEXITY OF CARE  The patient requires admission.      DIAGNOSIS  Final diagnoses:   Nonspecific chest pain         DISPOSITION  ED Disposition       ED Disposition   Decision to Admit    Condition   --    Comment   --               New Medications Ordered This Visit   Medications    sodium chloride 0.9 % flush 10 mL    aspirin tablet 325 mg    nitroglycerin (NITROSTAT) SL tablet 0.4 mg    sodium chloride 0.9 % flush 10 mL    sodium chloride 0.9 % flush 10 mL    sodium chloride 0.9 % infusion 40 mL    AND Linked Order Group     sennosides-docusate (PERICOLACE) 8.6-50 MG per tablet 2 tablet     polyethylene glycol (MIRALAX) packet 17 g     bisacodyl (DULCOLAX) EC tablet 5 mg     bisacodyl (DULCOLAX) suppository 10 mg       Please note that portions of this document were completed with a voice recognition program.    Note Disclaimer: At [facility], we believe that sharing information builds trust and better relationships. You are receiving this note because you recently visited [facility]. It is possible you will see health information before a provider has talked with you about it. This kind of information can be easy to misunderstand. To help you fully understand what it means for your health, we urge you to discuss this note with your provider.         Minh Morelos MD  06/16/25 0470       Minh Morelos MD  06/16/25 3090

## 2025-06-16 NOTE — ED NOTES
Nursing report ED to floor  Minnie Ross  79 y.o.  female    HPI :  HPI  Stated Reason for Visit: chest tightness x weeks; high blood pressure  History Obtained From: patient    Chief Complaint  Chief Complaint   Patient presents with    Chest Pain    Hypertension       Admitting doctor:   Carey Banegas MD    Admitting diagnosis:   The encounter diagnosis was Nonspecific chest pain.    Code status:   Current Code Status       Date Active Code Status Order ID Comments User Context       Prior            Allergies:   Patient has no known allergies.    Isolation:   No active isolations    Intake and Output  No intake or output data in the 24 hours ending 06/16/25 1750    Weight:       06/16/25  1258   Weight: 76.7 kg (169 lb)       Most recent vitals:   Vitals:    06/16/25 1447 06/16/25 1504 06/16/25 1522 06/16/25 1710   BP:  164/89  145/75   BP Location:       Patient Position:       Pulse:  76 69 80   Resp:       Temp: 98.1 °F (36.7 °C)      TempSrc: Oral      SpO2:  98% 95% 98%   Weight:       Height:           Active LDAs/IV Access:   Lines, Drains & Airways       Active LDAs       Name Placement date Placement time Site Days    Peripheral IV 06/16/25 1739 20 G Anterior;Left Forearm 06/16/25  1739  Forearm  less than 1                    Labs (abnormal labs have a star):   Labs Reviewed   COMPREHENSIVE METABOLIC PANEL - Abnormal; Notable for the following components:       Result Value    Glucose 167 (*)     Alkaline Phosphatase 126 (*)     All other components within normal limits    Narrative:     GFR Categories in Chronic Kidney Disease (CKD)              GFR Category          GFR (mL/min/1.73)    Interpretation  G1                    90 or greater        Normal or high (1)  G2                    60-89                Mild decrease (1)  G3a                   45-59                Mild to moderate decrease  G3b                   30-44                Moderate to severe decrease  G4                    15-29                 Severe decrease  G5                    14 or less           Kidney failure    (1)In the absence of evidence of kidney disease, neither GFR category G1 or G2 fulfill the criteria for CKD.    eGFR calculation 2021 CKD-EPI creatinine equation, which does not include race as a factor   CBC WITH AUTO DIFFERENTIAL - Abnormal; Notable for the following components:    RDW 11.7 (*)     Monocyte % 4.8 (*)     All other components within normal limits   TROPONIN - Normal    Narrative:     High Sensitive Troponin T Reference Range:  <14.0 ng/L- Negative Female for AMI  <22.0 ng/L- Negative Male for AMI  >=14 - Abnormal Female indicating possible myocardial injury.  >=22 - Abnormal Male indicating possible myocardial injury.   Clinicians would have to utilize clinical acumen, EKG, Troponin, and serial changes to determine if it is an Acute Myocardial Infarction or myocardial injury due to an underlying chronic condition.        HIGH SENSITIVITIY TROPONIN T 1HR - Normal    Narrative:     High Sensitive Troponin T Reference Range:  <14.0 ng/L- Negative Female for AMI  <22.0 ng/L- Negative Male for AMI  >=14 - Abnormal Female indicating possible myocardial injury.  >=22 - Abnormal Male indicating possible myocardial injury.   Clinicians would have to utilize clinical acumen, EKG, Troponin, and serial changes to determine if it is an Acute Myocardial Infarction or myocardial injury due to an underlying chronic condition.        RAINBOW DRAW    Narrative:     The following orders were created for panel order [place] Draw.  Procedure                               Abnormality         Status                     ---------                               -----------         ------                     Green Top (Gel)[[id number]]                                  Final result               Lavender Top[[id number]]                                     Final result               Gold Top - SST[[id number]]                                    Final result               Light Blue Top[[id number]]                                   Final result                 Please view results for these tests on the individual orders.   CBC AND DIFFERENTIAL    Narrative:     The following orders were created for panel order CBC & Differential.  Procedure                               Abnormality         Status                     ---------                               -----------         ------                     CBC Auto Differential[[id number]]        Abnormal            Final result                 Please view results for these tests on the individual orders.   GREEN TOP   LAVENDER TOP   GOLD TOP - SST   LIGHT BLUE TOP       EKG:   ECG 12 Lead ED Triage Standing Order; Chest Pain   Final Result   HEART RATE=72  bpm   RR Jewyxzbp=331  ms   NV Csqhjpfm=023  ms   P Horizontal Axis=-8  deg   P Front Axis=52  deg   QRSD Interval=63  ms   QT Eynwujts=008  ms   HHeP=616  ms   QRS Axis=41  deg   T Wave Axis=83  deg   - BORDERLINE ECG -   Sinus rhythm   Left atrial enlargement   No change from prior tracing   Electronically Signed By: Patricia Smith ([facility]) 2025-06-16 14:04:36   Date and Time of Study:2025-06-16 13:02:08      ECG 12 Lead Chest Pain    (Results Pending)   ECG 12 Lead Chest Pain    (Results Pending)       Meds given in ED:   Medications   sodium chloride 0.9 % flush 10 mL (has no administration in time range)   nitroglycerin (NITROSTAT) SL tablet 0.4 mg (has no administration in time range)   sodium chloride 0.9 % flush 10 mL (has no administration in time range)   sodium chloride 0.9 % flush 10 mL (has no administration in time range)   sodium chloride 0.9 % infusion 40 mL (has no administration in time range)   sennosides-docusate (PERICOLACE) 8.6-50 MG per tablet 2 tablet (has no administration in time range)     And   polyethylene glycol (MIRALAX) packet 17 g (has no administration in time range)     And   bisacodyl (DULCOLAX) EC tablet 5 mg (has no  administration in time range)     And   bisacodyl (DULCOLAX) suppository 10 mg (has no administration in time range)   aspirin tablet 325 mg (325 mg Oral Given 25 1525)       Imaging results:  XR Chest 1 View  Result Date: 2025  No focal pulmonary consolidation. Tortuous aorta. Follow-up as clinically indicated.  This report was finalized on 2025 1:37 PM by Dr. Ibrahima Estrella M.D on Workstation: [id number]        Ambulatory status:   - ad rad    Social issues:   Social History     Socioeconomic History    Marital status:      Spouse name: Julián    Number of children: 1   Tobacco Use    Smoking status: Former     Current packs/day: 0.00     Average packs/day: 1 pack/day for 25.0 years (25.0 ttl pk-yrs)     Types: Cigarettes     Start date: 1982     Quit date: 2007     Years since quittin.4     Passive exposure: Past    Smokeless tobacco: Never    Tobacco comments:     Caffeine - approximately 2 diet cokes a day   Vaping Use    Vaping status: Never Used   Substance and Sexual Activity    Alcohol use: Yes     Alcohol/week: 7.0 standard drinks of alcohol     Types: 7 Glasses of wine per week     Comment: 3-4 week    Drug use: No    Sexual activity: Not Currently     Partners: Male     Birth control/protection: Post-menopausal       Peripheral Neurovascular       Neuro Cognitive  Neuro Cognitive (Adult)  Cognitive/Neuro/Behavioral WDL: WDL  Sedation Group  POSS ([place] Opioid-Induced Sed Scale): 1 - Awake and alert    Learning       Respiratory  Respiratory WDL  Respiratory WDL: WDL    Abdominal Pain       Pain Assessments  Pain (Adult)  (0-10) Pain Rating: Rest: 1  (0-10) Pain Rating: Activity: 1  Pain Location: chest  Pain Description: intermittent, tightness  Response to Pain Interventions: interventions effective per patient    NIH Stroke Scale       Christiano Diez RN  25 17:50 EDT

## 2025-06-16 NOTE — PLAN OF CARE
Goal Outcome Evaluation: Admitted for chest tightness.  Described as 1/10 tightness.  Vital signs stable.  NPO at 12.  Cardio consulted for the AM

## 2025-06-17 ENCOUNTER — APPOINTMENT (OUTPATIENT)
Dept: CARDIOLOGY | Facility: HOSPITAL | Age: 80
End: 2025-06-17
Payer: MEDICARE

## 2025-06-17 ENCOUNTER — READMISSION MANAGEMENT (OUTPATIENT)
Dept: CALL CENTER | Facility: HOSPITAL | Age: 80
End: 2025-06-17
Payer: MEDICARE

## 2025-06-17 VITALS
OXYGEN SATURATION: 97 % | HEART RATE: 70 BPM | SYSTOLIC BLOOD PRESSURE: 176 MMHG | HEIGHT: 62 IN | WEIGHT: 169 LBS | DIASTOLIC BLOOD PRESSURE: 72 MMHG | BODY MASS INDEX: 31.1 KG/M2 | RESPIRATION RATE: 18 BRPM | TEMPERATURE: 98.4 F

## 2025-06-17 LAB
ANION GAP SERPL CALCULATED.3IONS-SCNC: 12 MMOL/L (ref 5–15)
AORTIC DIMENSIONLESS INDEX: 0.82 (DI)
ASCENDING AORTA: 2.8 CM
AV MEAN PRESS GRAD SYS DOP V1V2: 4 MMHG
AV VMAX SYS DOP: 138 CM/SEC
BH CV ECHO MEAS - ACS: 1.78 CM
BH CV ECHO MEAS - AO MAX PG: 7.6 MMHG
BH CV ECHO MEAS - AO ROOT DIAM: 2.8 CM
BH CV ECHO MEAS - AO V2 VTI: 29.4 CM
BH CV ECHO MEAS - AVA(I,D): 2.5 CM2
BH CV ECHO MEAS - EDV(CUBED): 79.5 ML
BH CV ECHO MEAS - EDV(MOD-SP2): 42 ML
BH CV ECHO MEAS - EDV(MOD-SP4): 48 ML
BH CV ECHO MEAS - EF(MOD-SP2): 61.9 %
BH CV ECHO MEAS - EF(MOD-SP4): 60.4 %
BH CV ECHO MEAS - ESV(CUBED): 29.8 ML
BH CV ECHO MEAS - ESV(MOD-SP2): 16 ML
BH CV ECHO MEAS - ESV(MOD-SP4): 19 ML
BH CV ECHO MEAS - FS: 27.9 %
BH CV ECHO MEAS - IVS/LVPW: 1.14 CM
BH CV ECHO MEAS - IVSD: 0.8 CM
BH CV ECHO MEAS - LAT PEAK E' VEL: 8.9 CM/SEC
BH CV ECHO MEAS - LV DIASTOLIC VOL/BSA (35-75): 27 CM2
BH CV ECHO MEAS - LV MASS(C)D: 96.8 GRAMS
BH CV ECHO MEAS - LV MAX PG: 5.5 MMHG
BH CV ECHO MEAS - LV MEAN PG: 3 MMHG
BH CV ECHO MEAS - LV SYSTOLIC VOL/BSA (12-30): 10.7 CM2
BH CV ECHO MEAS - LV V1 MAX: 117 CM/SEC
BH CV ECHO MEAS - LV V1 VTI: 24.1 CM
BH CV ECHO MEAS - LVIDD: 4.3 CM
BH CV ECHO MEAS - LVIDS: 3.1 CM
BH CV ECHO MEAS - LVOT AREA: 3.1 CM2
BH CV ECHO MEAS - LVOT DIAM: 1.98 CM
BH CV ECHO MEAS - LVPWD: 0.7 CM
BH CV ECHO MEAS - MED PEAK E' VEL: 5.9 CM/SEC
BH CV ECHO MEAS - MV A DUR: 0.11 SEC
BH CV ECHO MEAS - MV A MAX VEL: 122 CM/SEC
BH CV ECHO MEAS - MV DEC SLOPE: 276.3 CM/SEC2
BH CV ECHO MEAS - MV DEC TIME: 0.2 SEC
BH CV ECHO MEAS - MV E MAX VEL: 91.8 CM/SEC
BH CV ECHO MEAS - MV E/A: 0.75
BH CV ECHO MEAS - MV MAX PG: 4.8 MMHG
BH CV ECHO MEAS - MV MEAN PG: 1.7 MMHG
BH CV ECHO MEAS - MV P1/2T: 81.9 MSEC
BH CV ECHO MEAS - MV V2 VTI: 24.8 CM
BH CV ECHO MEAS - MVA(P1/2T): 2.7 CM2
BH CV ECHO MEAS - MVA(VTI): 3 CM2
BH CV ECHO MEAS - PA ACC TIME: 0.11 SEC
BH CV ECHO MEAS - PA V2 MAX: 77.6 CM/SEC
BH CV ECHO MEAS - RAP SYSTOLE: 3 MMHG
BH CV ECHO MEAS - RV MAX PG: 3.2 MMHG
BH CV ECHO MEAS - RV V1 MAX: 89.2 CM/SEC
BH CV ECHO MEAS - RV V1 VTI: 15.9 CM
BH CV ECHO MEAS - RVSP: 24 MMHG
BH CV ECHO MEAS - SV(LVOT): 74 ML
BH CV ECHO MEAS - SV(MOD-SP2): 26 ML
BH CV ECHO MEAS - SV(MOD-SP4): 29 ML
BH CV ECHO MEAS - SVI(LVOT): 41.6 ML/M2
BH CV ECHO MEAS - SVI(MOD-SP2): 14.6 ML/M2
BH CV ECHO MEAS - SVI(MOD-SP4): 16.3 ML/M2
BH CV ECHO MEAS - TAPSE (>1.6): 1.74 CM
BH CV ECHO MEAS - TR MAX PG: 20.8 MMHG
BH CV ECHO MEAS - TR MAX VEL: 228 CM/SEC
BH CV ECHO MEASUREMENTS AVERAGE E/E' RATIO: 12.41
BH CV XLRA - TDI S': 11.4 CM/SEC
BUN SERPL-MCNC: 12 MG/DL (ref 8–23)
BUN/CREAT SERPL: 17.1 (ref 7–25)
CALCIUM SPEC-SCNC: 9.7 MG/DL (ref 8.6–10.5)
CHLORIDE SERPL-SCNC: 106 MMOL/L (ref 98–107)
CO2 SERPL-SCNC: 21 MMOL/L (ref 22–29)
CREAT SERPL-MCNC: 0.7 MG/DL (ref 0.57–1)
DEPRECATED RDW RBC AUTO: 40.8 FL (ref 37–54)
EGFRCR SERPLBLD CKD-EPI 2021: 89.2 ML/MIN/1.73
ERYTHROCYTE [DISTWIDTH] IN BLOOD BY AUTOMATED COUNT: 11.8 % (ref 12.3–15.4)
GLUCOSE SERPL-MCNC: 115 MG/DL (ref 65–99)
HCT VFR BLD AUTO: 42.4 % (ref 34–46.6)
HGB BLD-MCNC: 14.1 G/DL (ref 12–15.9)
LEFT ATRIUM VOLUME INDEX: 16 ML/M2
LV EF BIPLANE MOD: 61.1 %
MAGNESIUM SERPL-MCNC: 2.5 MG/DL (ref 1.6–2.4)
MCH RBC QN AUTO: 31.2 PG (ref 26.6–33)
MCHC RBC AUTO-ENTMCNC: 33.3 G/DL (ref 31.5–35.7)
MCV RBC AUTO: 93.8 FL (ref 79–97)
PLATELET # BLD AUTO: 402 10*3/MM3 (ref 140–450)
PMV BLD AUTO: 8.6 FL (ref 6–12)
POTASSIUM SERPL-SCNC: 4.2 MMOL/L (ref 3.5–5.2)
QT INTERVAL: 390 MS
QT INTERVAL: 409 MS
QTC INTERVAL: 416 MS
QTC INTERVAL: 434 MS
RBC # BLD AUTO: 4.52 10*6/MM3 (ref 3.77–5.28)
SINUS: 2.8 CM
SODIUM SERPL-SCNC: 139 MMOL/L (ref 136–145)
STJ: 1.73 CM
TROPONIN T SERPL HS-MCNC: 12 NG/L
WBC NRBC COR # BLD AUTO: 9.11 10*3/MM3 (ref 3.4–10.8)

## 2025-06-17 PROCEDURE — 93306 TTE W/DOPPLER COMPLETE: CPT

## 2025-06-17 PROCEDURE — 93005 ELECTROCARDIOGRAM TRACING: CPT | Performed by: EMERGENCY MEDICINE

## 2025-06-17 PROCEDURE — G0378 HOSPITAL OBSERVATION PER HR: HCPCS

## 2025-06-17 PROCEDURE — 93010 ELECTROCARDIOGRAM REPORT: CPT | Performed by: STUDENT IN AN ORGANIZED HEALTH CARE EDUCATION/TRAINING PROGRAM

## 2025-06-17 PROCEDURE — 93306 TTE W/DOPPLER COMPLETE: CPT | Performed by: INTERNAL MEDICINE

## 2025-06-17 PROCEDURE — 84484 ASSAY OF TROPONIN QUANT: CPT | Performed by: PHYSICIAN ASSISTANT

## 2025-06-17 PROCEDURE — 80048 BASIC METABOLIC PNL TOTAL CA: CPT | Performed by: EMERGENCY MEDICINE

## 2025-06-17 PROCEDURE — 83735 ASSAY OF MAGNESIUM: CPT | Performed by: EMERGENCY MEDICINE

## 2025-06-17 PROCEDURE — 99214 OFFICE O/P EST MOD 30 MIN: CPT | Performed by: NURSE PRACTITIONER

## 2025-06-17 PROCEDURE — 85027 COMPLETE CBC AUTOMATED: CPT | Performed by: EMERGENCY MEDICINE

## 2025-06-17 RX ORDER — PANTOPRAZOLE SODIUM 40 MG/1
40 TABLET, DELAYED RELEASE ORAL
Status: DISCONTINUED | OUTPATIENT
Start: 2025-06-17 | End: 2025-06-17 | Stop reason: HOSPADM

## 2025-06-17 RX ORDER — PANTOPRAZOLE SODIUM 40 MG/1
40 TABLET, DELAYED RELEASE ORAL
Qty: 30 TABLET | Refills: 0 | Status: SHIPPED | OUTPATIENT
Start: 2025-06-17

## 2025-06-17 RX ADMIN — AMLODIPINE BESYLATE 5 MG: 5 TABLET ORAL at 07:39

## 2025-06-17 RX ADMIN — LISINOPRIL 20 MG: 20 TABLET ORAL at 07:39

## 2025-06-17 RX ADMIN — Medication 10 ML: at 09:25

## 2025-06-17 NOTE — OUTREACH NOTE
Prep Survey      Flowsheet Row Responses   [facility] patient discharged from? [facility]   Is LACE score < 7 ? Yes   Eligibility [facility]   Date of Admission 06/16/25   Date of Discharge 06/17/25   Discharge Disposition Home or Self Care   Discharge diagnosis Chest pain   Does the patient have one of the following disease processes/diagnoses(primary or secondary)? Other   Does the patient have Home health ordered? No   Is there a DME ordered? No   Prep survey completed? Yes            Sabina CHIRINOS - Registered Nurse

## 2025-06-17 NOTE — CONSULTS
"[facility]        Patient Name: Minnie Ross  Age/Sex: 79 y.o. female  : 1945  MRN: [id number]    Date of Admission: 2025  Date of Encounter Visit: 25  Encounter Provider: GABRIELE Romero  Referring Provider: Carey Banegas MD  Place of Service: [facility] CARDIOLOGY  Patient Care Team:  Andriy Nielson MD as PCP - General (Family Medicine)  Minnie Colindres MD as Consulting Physician (Dermatology)  Tammie Walsh APRN as Nurse Practitioner (Nurse Practitioner)  Blas Mai MD as Consulting Physician (Ophthalmology)  Andriy Hernandez MD (Internal Medicine)  Andriy Nielson MD as Referring Physician (Family Medicine)  Betito Ko MD as Consulting Physician (Hematology and Oncology)  Lynn Thomas RN as Ambulatory  ([facility])    Subjective:     Chief Complaint: Chest pain    Reason for consult: Chest pain      Minnie Ross is a 79 y.o. female who follows myself and Dr. Jamil in our office.      Patient was admitted with chest pain.  We have been asked to be seen for chest pain.     Patient with history of atrial flutter with prior ablation, anticoagulated with rivaroxaban.    Patient presented to [facility] with complaints of left-sided chest pain for the past 2 to 3 weeks.  She reports that the pain is intermittent.  She denies exertional symptoms or associated symptoms of dyspnea, lightheadedness.  In the emergency department initial troponin 9 with repeat 8.  CBC and CMP largely unremarkable.  Chest x-ray normal.  No ischemic changes on ECG.  Patient has echocardiogram pending.    Patient reports that for the past 2 to 3 weeks she has been experiencing intermittent chest pain.  She states that the chest pain is almost like \"indigestion\".  She states that she had a similar episode several decades ago and she went to a gastroenterologist and was given \"pill\" that relieved the pain.  " She states that such pills are now available over-the-counter and she suspects that it was a PPI.  She reports that she did not take an over-the-counter PPI before coming to the emergency department.  She notes that the main reason for the emergency department visit was that her blood pressure was elevated.  Blood pressure has now been normotensive.    History of Present Illness:    Myocardial perfusion stress test 9/2/2019.  No evidence of ischemia.  Impressions consistent with low risk study.    Review of medical records reveals patient hospitalized 7/18 - 7/19/2022 for new onset atrial flutter.  Patient was admitted to the hospital and her rate was controlled.  Echocardiogram revealed a low-normal LVEF with no significant valvular abnormalities.  Patient was placed on apixaban, referral placed to EP in 4 weeks to determine if patient would be in need of cardiac ablation.  Patient also placed on diltiazem and metoprolol succinate for rate control.     Patient had a atrial flutter ablation September 2022.    Echocardiogram 7/19/2022.  LVEF 51-55%.  Mild mitral valve regurgitation.  Mild tricuspid valve regurgitation.      Past Medical History:  Past Medical History:   Diagnosis Date    Abnormal Pap smear of cervix     Arrhythmia     Arthritis     Atrial fibrillation     Atrial flutter     Bursitis of hip     Cataract     H/O complete eye exam 01/2018    History of blood transfusion     History of bone density study 04/2018    Hypertension     IBS (irritable bowel syndrome)     Knee swelling     Numbness and tingling     Periarthritis of shoulder     Tremor     Visual impairment        Past Surgical History:   Procedure Laterality Date    APPENDECTOMY  1960    CARDIAC ELECTROPHYSIOLOGY PROCEDURE N/A 09/21/2022    Procedure: Ablation atrial flutter;  Surgeon: Daniel Dos Santos MD;  Location: [facility] INVASIVE LOCATION;  Service: Cardiovascular;  Laterality: N/A;    CATARACT EXTRACTION  2019    CHOLECYSTECTOMY   1975    COLONOSCOPY  2020    ENDOMETRIAL ABLATION      JOINT REPLACEMENT  2010    KNEE SURGERY Right 2008    Homero Gaston MD    MAMMO BILATERAL  2018    MOUTH SURGERY  2013    PAP SMEAR  2016    PARTIAL KNEE ARTHROPLASTY  2010       Home Medications:   Medications Prior to Admission   Medication Sig Dispense Refill Last Dose/Taking    amLODIPine-benazepril (LOTREL 5-20) 5-20 MG per capsule Take 1 capsule by mouth Daily. 90 capsule 3 2025    ascorbic acid (VITAMIN C) 1000 MG tablet    2025    Calcium-Magnesium-Vitamin D (CITRACAL CALCIUM+D PO)    2025    cyclobenzaprine (FLEXERIL) 10 MG tablet Take 1 tablet by mouth Daily As Needed for Muscle Spasms. 30 tablet 2 Past Week    Milk Thistle 175 MG capsule    2025    rivaroxaban (XARELTO) 20 MG tablet Take 1 tablet by mouth Daily. 14 tablet 0 2025    TART CHERRY PO Take 465 mg by mouth.   2025    TURMERIC PO Take  by mouth.   2025       Allergies:  No Known Allergies    Past Social History:  Social History     Socioeconomic History    Marital status:      Spouse name: Julián    Number of children: 1   Tobacco Use    Smoking status: Former     Current packs/day: 0.00     Average packs/day: 1 pack/day for 25.0 years (25.0 ttl pk-yrs)     Types: Cigarettes     Start date: 1982     Quit date: 2007     Years since quittin.4     Passive exposure: Past    Smokeless tobacco: Never    Tobacco comments:     Caffeine - approximately 2 diet cokes a day   Vaping Use    Vaping status: Never Used   Substance and Sexual Activity    Alcohol use: Yes     Alcohol/week: 7.0 standard drinks of alcohol     Types: 7 Glasses of wine per week     Comment: 3-4 week    Drug use: No    Sexual activity: Not Currently     Partners: Male     Birth control/protection: Post-menopausal       Past Family History:  Family History   Problem Relation Age of Onset    Hypertension Mother     Heart disease Mother     Arthritis Mother     Deep vein  thrombosis Brother     Vision loss Father     Hyperlipidemia Sister        Review of Systems   Constitutional: Negative for chills, fever, weight gain and weight loss.   Cardiovascular:  Positive for chest pain. Negative for leg swelling.   Respiratory:  Negative for cough, snoring and wheezing.    Hematologic/Lymphatic: Negative for bleeding problem. Does not bruise/bleed easily.   Skin:  Negative for color change.   Musculoskeletal:  Negative for falls, joint pain and myalgias.   Gastrointestinal:  Positive for heartburn. Negative for melena.   Genitourinary:  Negative for hematuria.   Neurological:  Negative for excessive daytime sleepiness.   Psychiatric/Behavioral:  Negative for depression. The patient is not nervous/anxious.          Objective:   Temp:  [97.5 °F (36.4 °C)-98.3 °F (36.8 °C)] 98.2 °F (36.8 °C)  Heart Rate:  [59-81] 59  Resp:  [15-18] 18  BP: (138-164)/(67-90) 138/80   No intake or output data in the 24 hours ending 06/17/25 0926  Body mass index is 30.91 kg/m².      06/16/25  1258 06/16/25  1831 06/17/25  0920   Weight: 76.7 kg (169 lb) 76.7 kg (169 lb) 76.7 kg (169 lb)     Weight change:     Vitals and nursing note reviewed.   Constitutional:       Appearance: Normal appearance. Well-developed.   Eyes:      Conjunctiva/sclera: Conjunctivae normal.   Neck:      Vascular: No carotid bruit.   Pulmonary:      Breath sounds: Normal breath sounds.   Cardiovascular:      Normal rate. Regular rhythm. Normal S1 with normal intensity. Normal S2 with normal intensity.       Murmurs: There is no murmur.      No gallop.  No click. No rub.   Edema:     Peripheral edema absent.   Musculoskeletal: Normal range of motion. Skin:     General: Skin is warm and dry.   Neurological:      Mental Status: Alert and oriented to person, place, and time.      GCS: GCS eye subscore is 4. GCS verbal subscore is 5. GCS motor subscore is 6.   Psychiatric:         Speech: Speech normal.         Behavior: Behavior normal.        "  Thought Content: Thought content normal.         Judgment: Judgment normal.           Lab Review:   Results from last 7 days   Lab Units 06/17/25  0534 06/16/25  1306   SODIUM mmol/L 139 141   POTASSIUM mmol/L 4.2 4.6   CHLORIDE mmol/L 106 104   CO2 mmol/L 21.0* 24.5   BUN mg/dL 12.0 14.0   CREATININE mg/dL 0.70 0.82   GLUCOSE mg/dL 115* 167*   CALCIUM mg/dL 9.7 9.8   AST (SGOT) U/L  --  24   ALT (SGPT) U/L  --  24     Results from last 7 days   Lab Units 06/17/25  0534 06/16/25  1505 06/16/25  1306   HSTROP T ng/L 12 8 9     Results from last 7 days   Lab Units 06/17/25  0534 06/16/25  1306   WBC 10*3/mm3 9.11 9.79   HEMOGLOBIN g/dL 14.1 14.4   HEMATOCRIT % 42.4 44.1   PLATELETS 10*3/mm3 402 404         Results from last 7 days   Lab Units 06/17/25  0534   MAGNESIUM mg/dL 2.5*           Invalid input(s): \"LDLCALC\"            Echo EF Estimated  Results for orders placed during the hospital encounter of 07/18/22    Adult Transthoracic Echo Complete W/ Cont if Necessary Per Protocol    Interpretation Summary  · Estimated left ventricular EF was in disagreement with the calculated left ventricular EF. Left ventricular ejection fraction appears to be 51 - 55%. Left ventricular systolic function is low normal.  · Left ventricular diastolic function was indeterminate.  · Mild mitral valve regurgitation is present.  · Mild tricuspid valve regurgitation is present.  · Estimated right ventricular systolic pressure from tricuspid regurgitation is normal (<35 mmHg).      EKG:   I personally viewed and interpreted the patient's EKG            Imaging:  Imaging Results (Most Recent)       Procedure Component Value Units Date/Time    XR Chest 1 View [[id number]] Collected: 06/16/25 1336     Updated: 06/16/25 1340    Narrative:      XR CHEST 1 VW-     HISTORY: Female who is 79 years-old, chest pain     TECHNIQUE: Frontal view of the chest     COMPARISON: 9/1/2019     FINDINGS: The heart size is normal. Aorta is tortuous. " Pulmonary  vasculature is unremarkable. No focal pulmonary consolidation, pleural  effusion, or pneumothorax. Old granulomatous disease is apparent. No  acute osseous process.       Impression:      No focal pulmonary consolidation. Tortuous aorta. Follow-up  as clinically indicated.     This report was finalized on 6/16/2025 1:37 PM by Dr. Ibrahima Estrella M.D on Workstation: [id number]                   Assessment:     Active Hospital Problems    Diagnosis  POA    **Chest pain [R07.9]  Yes      Resolved Hospital Problems   No resolved problems to display.          Plan:     Atypical chest pain  Patient's chest pain sounds atypical and possibly consistent with indigestion.  ECG is nonischemic, high-sensitivity troponins have been flat and negative.  Echocardiogram reveals preserved EF with normal LV wall motion.  Patient not actively having chest pain.  She is at risk for ischemia and I do think she will need a myocardial perfusion stress test however this can be done in the outpatient setting.  My office will arrange for outpatient stress test in clinic with close follow-up with me after.  Hypertension  BP was originally elevated on arrival however it is more controlled now  Continue home dose of amlodipine/benazepril at discharge.    Plan: Patient stable for discharge.  Follow-up with me in 1-2 weeks with an outpatient stress test.  My clinic will arrange.      Thank you for allowing me to participate in the care of Minnie Ross. Feel free to contact me directly with any further questions or concerns.         GABRIELE Romero  [facility]   [facility]  [address] [place] [address]  Office: [phone]    06/17/25  09:26 EDT      EMR Dragon/Transcription disclaimer:   Parts of this note may be an electronic transcription/translation of spoken language to printed text using the Dragon dictation system

## 2025-06-17 NOTE — H&P
[facility]   HISTORY AND PHYSICAL    Patient Name: Minnie Ross  : 1945  MRN: [id number]  Primary Care Physician:  Andriy Nielson MD  Date of admission: 2025    Subjective   Subjective     Chief Complaint:   Chief Complaint   Patient presents with    Chest Pain    Hypertension         HPI:    Minnie Ross is a 79 y.o. female comes in complaining of chest pain.  Patient states pain has been in the left side of her chest for the past 2 to 3 weeks.  Patient states the pain will come and go.  Patient denies any exertional symptoms, shortness of breath, lightheadedness or feeling like she is going to pass out.  Patient does report a history of atrial flutter status post ablation with Dr. Dos Santos and is on Xarelto for this.  Patient denies any leg swelling, recent illness, fever, chills, cough, vomiting or diarrhea.    In the ED, initial troponin 9, repeat troponin of 8, CBC and CMP largely unremarkable for acute findings.  Chest x-ray shows no focal pulmonary consolidation.  Tortuous aorta.  Follow-up as indicated. EKG shows sinus rhythm 72 bpm, no ST elevation apparent.  No change from prior tracing.  Repeat EKG shows sinus rhythm 70 beats a minute, no ST elevation apparent.  Patient is afebrile, pulse in 70s, on room air oxygen 98% SpO2 and blood pressure 150s over 80s.     Review of Systems   All systems were reviewed and negative except for: as per HPI    Personal History     Past Medical History:   Diagnosis Date    Abnormal Pap smear of cervix     Arrhythmia     Arthritis     Atrial fibrillation     Atrial flutter     Bursitis of hip     Cataract     H/O complete eye exam 2018    History of blood transfusion     History of bone density study 2018    Hypertension     IBS (irritable bowel syndrome)     Knee swelling     Numbness and tingling     Periarthritis of shoulder     Tremor     Visual impairment        Past Surgical History:   Procedure Laterality Date    APPENDECTOMY       CARDIAC ELECTROPHYSIOLOGY PROCEDURE N/A 09/21/2022    Procedure: Ablation atrial flutter;  Surgeon: Daniel Dos Santos MD;  Location: [facility] INVASIVE LOCATION;  Service: Cardiovascular;  Laterality: N/A;    CATARACT EXTRACTION  2019    CHOLECYSTECTOMY  1975    COLONOSCOPY  2020    ENDOMETRIAL ABLATION  2022    JOINT REPLACEMENT  2010    KNEE SURGERY Right 2008    Homero Gaston MD    MAMMO BILATERAL  2018    MOUTH SURGERY  2013    PAP SMEAR  12/29/2016    PARTIAL KNEE ARTHROPLASTY  2010       Family History: family history includes Arthritis in her mother; Deep vein thrombosis in her brother; Heart disease in her mother; Hyperlipidemia in her sister; Hypertension in her mother; Vision loss in her father. Otherwise pertinent FHx was reviewed and not pertinent to current issue.    Social History:  reports that she quit smoking about 18 years ago. Her smoking use included cigarettes. She started smoking about 43 years ago. She has a 25 pack-year smoking history. She has been exposed to tobacco smoke. She has never used smokeless tobacco. She reports current alcohol use of about 7.0 standard drinks of alcohol per week. She reports that she does not use drugs.    Home Medications:  Calcium-Magnesium-Vitamin D, Milk Thistle, Tart Cherry, Turmeric, amLODIPine-benazepril, ascorbic acid, cyclobenzaprine, and rivaroxaban    Allergies:  No Known Allergies    Objective   Objective     Vitals:   Temp:  [97.5 °F (36.4 °C)-98.3 °F (36.8 °C)] 98.3 °F (36.8 °C)  Heart Rate:  [69-81] 73  Resp:  [15-16] 16  BP: (145-164)/(67-89) 159/85  Physical Exam    Constitutional: Awake, alert   Eyes: PERRLA, sclerae anicteric, no conjunctival injection   HENT: NCAT, mucous membranes moist   Neck: Supple, no thyromegaly, no lymphadenopathy, trachea midline   Respiratory: Clear to auscultation bilaterally, nonlabored respirations    Cardiovascular: RRR, no murmurs, rubs, or gallops, palpable pedal pulses bilaterally   Gastrointestinal:  Positive bowel sounds, soft, nontender, nondistended   Musculoskeletal: No bilateral ankle edema, no clubbing or cyanosis to extremities   Psychiatric: Appropriate affect, cooperative   Neurologic: Oriented x 3, strength symmetric in all extremities, Cranial Nerves grossly intact to confrontation, speech clear   Skin: No rashes     Result Review    Result Review:  I have personally reviewed the results from the time of this admission to 6/16/2025 22:53 EDT and agree with these findings:  [x]  Laboratory list / accordion  []  Microbiology  [x]  Radiology  [x]  EKG/Telemetry   []  Cardiology/Vascular   []  Pathology  []  Old records  []  Other:  Most notable findings include: see above      The 10-year ASCVD risk score (Tomas SÁNCHEZ, et al., 2019) is: 37%    Values used to calculate the score:      Age: 79 years      Sex: Female      Is Non- : No      Diabetic: No      Tobacco smoker: No      Systolic Blood Pressure: 159 mmHg      Is BP treated: Yes      HDL Cholesterol: 59 mg/dL      Total Cholesterol: 171 mg/dL      Assessment & Plan   Assessment / Plan     Brief Patient Summary:  Minnie Ross is a 79 y.o. female who comes in complaining of chest pain    Active Hospital Problems:  Active Hospital Problems    Diagnosis     **Chest pain      Plan:       Chest pain  -initial troponin 9, repeat troponin of 8,   -Chest x-ray shows no focal pulmonary consolidation.  Tortuous aorta.  Follow-up as indicated.   -EKG shows sinus rhythm 72 bpm, no ST elevation apparent.  No change from prior tracing.    -Repeat EKG shows sinus rhythm 70 beats a minute, no ST elevation apparent.    -Patient is afebrile, pulse in 70s, on room air oxygen 98% SpO2 and blood pressure 150s over 80s.   -Last echo in 2022 showed LVEF of 51 to 55%.  Mitral valve and tricuspid valve regurg present but mild.  -Last stress test in 2019 showed low risk study.  -Cardiology consult  -Check Echo  - Repeat troponin, EKG  - Continuous  cardiac monitoring  - Heart healthy diet n.p.o. midnight    History of atrial flutter  - Continue home Xarelto    Essential hypertension, chronic, poorly controlled, continue home amlodipine, lisinopril    Obesity, BMI 30.9, encourage lifestyle modifications        VTE Prophylaxis: SCDs  Pharmacologic & mechanical VTE prophylaxis orders are present.        CODE STATUS:       Admission Status:  I believe this patient meets observation status.    78 minutes have been spent by [facility] providers in the care of this patient while under observation status.      Appropriate PPE worn during patient encounter.  Hand hygeine performed before and after seeing the patient.      Electronically signed by MATEUSZ Chapin, 06/16/25, 9:07 PM EDT.

## 2025-06-17 NOTE — DISCHARGE SUMMARY
ED OBSERVATION PROGRESS/DISCHARGE SUMMARY    Date of Admission: 6/16/2025   LOS: 0 days   PCP: Andriy Nielson MD    Final Diagnosis: Atypical chest pain    Hospital Outcome:     79-year-old female admitted with medical history significant for atrial fibrillation anticoagulated on Xarelto, IBS admitted to the observation unit for further management due to atypical chest pain.  Endorses heartburn.  She was previously on omeprazole but is not currently taking a PPI.    CMP and CBC largely unremarkable.  Cardiology was consulted and they have seen the patient.  High-sensitivity troponins have been negative x3, EKG was sinus rhythm, nonischemic.  Echocardiogram shows a normal ejection fraction, normal LV wall motion.  She denies chest pain currently.  We will trial a PPI.  Continue her current blood pressure medication regimen.  They will arrange for follow-up with them in the office in 1 to 2 weeks with outpatient stress test.    ROS:  General: no fevers, chills  Respiratory: no cough, dyspnea  Cardiovascular: + chest pain, no palpitations  Abdomen: No abdominal pain, nausea, vomiting, or diarrhea  Neurologic: No focal weakness    Objective   Physical Exam:  I have reviewed the vital signs.  Temp:  [97.5 °F (36.4 °C)-98.4 °F (36.9 °C)] 98.4 °F (36.9 °C)  Heart Rate:  [59-81] 70  Resp:  [15-18] 18  BP: (138-176)/(67-90) 176/72  General Appearance:    Alert, cooperative, no distress  Head:    Normocephalic, atraumatic  Eyes:    Sclerae anicteric  Neck:   Supple, no mass  Lungs: Clear to auscultation bilaterally, respirations unlabored  Heart: Regular rate and rhythm, S1 and S2 normal, no murmur, rub or gallop  Abdomen:  Soft, nontender, bowel sounds active, nondistended  Extremities: No clubbing, cyanosis, or edema to lower extremities  Pulses:  2+ and symmetric in distal lower extremities  Skin: No rashes   Neurologic: Oriented x3, Normal strength to extremities    Results Review:    I have reviewed the labs, radiology  results and diagnostic studies.    Results from last 7 days   Lab Units 06/17/25  0534   WBC 10*3/mm3 9.11   HEMOGLOBIN g/dL 14.1   HEMATOCRIT % 42.4   PLATELETS 10*3/mm3 402     Results from last 7 days   Lab Units 06/17/25  0534 06/16/25  1306   SODIUM mmol/L 139 141   POTASSIUM mmol/L 4.2 4.6   CHLORIDE mmol/L 106 104   CO2 mmol/L 21.0* 24.5   BUN mg/dL 12.0 14.0   CREATININE mg/dL 0.70 0.82   CALCIUM mg/dL 9.7 9.8   BILIRUBIN mg/dL  --  0.4   ALK PHOS U/L  --  126*   ALT (SGPT) U/L  --  24   AST (SGOT) U/L  --  24   GLUCOSE mg/dL 115* 167*     Imaging Results (Last 24 Hours)       Procedure Component Value Units Date/Time    XR Chest 1 View [[id number]] Collected: 06/16/25 1336     Updated: 06/16/25 1340    Narrative:      XR CHEST 1 VW-     HISTORY: Female who is 79 years-old, chest pain     TECHNIQUE: Frontal view of the chest     COMPARISON: 9/1/2019     FINDINGS: The heart size is normal. Aorta is tortuous. Pulmonary  vasculature is unremarkable. No focal pulmonary consolidation, pleural  effusion, or pneumothorax. Old granulomatous disease is apparent. No  acute osseous process.       Impression:      No focal pulmonary consolidation. Tortuous aorta. Follow-up  as clinically indicated.     This report was finalized on 6/16/2025 1:37 PM by Dr. Ibrahima Estrella M.D on Workstation: [id number]               I have reviewed the medications.     Discharge Medications        New Medications        Instructions Start Date   pantoprazole 40 MG EC tablet  Commonly known as: PROTONIX   40 mg, Oral, Every Early Morning             Continue These Medications        Instructions Start Date   amLODIPine-benazepril 5-20 MG per capsule  Commonly known as: LOTREL 5-20   1 capsule, Oral, Daily      ascorbic acid 1000 MG tablet  Commonly known as: VITAMIN C   No dose, route, or frequency recorded.      CITRACAL CALCIUM+D PO   No dose, route, or frequency recorded.      cyclobenzaprine 10 MG tablet  Commonly known as: FLEXERIL    10 mg, Oral, Daily PRN      Milk Thistle 175 MG capsule       rivaroxaban 20 MG tablet  Commonly known as: XARELTO   20 mg, Oral, Daily      TART CHERRY PO   465 mg      TURMERIC PO   Take  by mouth.              ---------------------------------------------------------------------------------------------  Assessment & Plan   Assessment/Problem List    Chest pain    Plan:    Chest pain  HS troponin 9, 8, 12  Chest x-ray shows no focal pulmonary consolidation.  Tortuous aorta.  Follow-up as indicated.   EKG sinus rhythm, no acute ischemia  Cardiology consulted  Echocardiogram shows preserved EF, no wall motion abnormalities  Cardiology consult  Start Protonix 40 mg daily  Cardiology to arrange for follow-up in 1 to 2 weeks with outpatient stress test     History of atrial flutter, status post ablation  Continue home Xarelto  Essential hypertension, chronic, poorly controlled, continue home amlodipine, lisinopril    Obesity, BMI 30.9, encourage lifestyle modifications    Disposition: Home    Follow-up after Discharge: Primary care, cardiology    This note will serve as a discharge summary    Bella Guerra, APRN 06/17/25 11:47 EDT    I have worn appropriate PPE during this patient encounter, sanitized my hands both with entering and exiting patient's room.    32 minutes has been spent by [facility] providers in the care of this patient while under observation status on this date 06/17/25

## 2025-06-17 NOTE — PLAN OF CARE
Goal Outcome Evaluation: Patient has been cleared for discharge.  Following up  outpatient with PCP and cardio for stress test.  Receiving script for protonix from our pharmacy

## 2025-06-17 NOTE — PROGRESS NOTES
MD Attestation Note    SHARED VISIT: This visit was performed by BOTH a physician and an APC. The substantive portion of the medical decision making was performed by this attesting physician who made or approved the management plan and takes responsibility for patient management. All studies in the APC note (if performed) were independently interpreted by me.       My personal findings are:        Subjective:  Patient admitted for further evaluation of left-sided chest discomfort that she has had intermittently for the last 3 weeks.  It is not worse with exertion nor with eating but she does feel like it is indigestion at times.  She was previously on omeprazole but does not take any antacid regularly now.  She states that she has been dealing with chronic pain in both shoulders and recently had steroids injected which has alleviated her shoulder pain.  She had been taking Tylenol 650 mg twice daily for her shoulder pain and denies any NSAID use.  She denies any black or bloody stools.  She is on Xarelto due to history of atrial fibrillation.        Objective:  GENERAL: Awake, alert, in no acute distress.   HENT:  Normocephalic, atraumatic. Nares patent.   EYES: Pupils equal, reactive. Extraocular movements normal.   RESPIRATORY: Normal effort.   CARDIOVASCULAR: Regular rhythm, normal rate.   ABDOMEN:  Nontender. Abdomen nondistended.   NEUROLOGIC: Cranial nerves II-XII normal. Motor strength 5/5 in extremities.   EXTREMITIES: No pedal edema. 2+ pedal pulses bilaterally. No deformity.   SKIN:  No rash, normal to inspection.          Assessment/ Plan:  Chest pain  Cardiology evaluated, has ordered an echo which is pending.  If echo is reassuring, they will plan for outpatient stress test in the office which they will coordinate.  Serial cardiac troponins and EKG reassuring  She does have symptoms suspicious for GERD therefore we will go ahead and start her on a PPI at discharge as well.

## 2025-06-17 NOTE — PLAN OF CARE
Goal Outcome Evaluation:         Patient came in for chest tightness that been going on past 3 weeks. She does not request pain medication at this time. Her vital signs are stable and she is alert and oriented times 4. She has a cardiology consult and a echo planned for the morning.

## 2025-06-18 ENCOUNTER — TRANSITIONAL CARE MANAGEMENT TELEPHONE ENCOUNTER (OUTPATIENT)
Dept: CALL CENTER | Facility: HOSPITAL | Age: 80
End: 2025-06-18
Payer: MEDICARE

## 2025-06-18 NOTE — CASE MANAGEMENT/SOCIAL WORK
Case Management Discharge Note      Final Note: Home via private vehicle         Selected Continued Care - Discharged on 6/17/2025 Admission date: 6/16/2025 - Discharge disposition: Home or Self Care      Destination    No services have been selected for the patient.                Durable Medical Equipment    No services have been selected for the patient.                Dialysis/Infusion    No services have been selected for the patient.                Home Medical Care    No services have been selected for the patient.                Therapy    No services have been selected for the patient.                Community Resources    No services have been selected for the patient.                Community & DME    No services have been selected for the patient.                    Selected Continued Care - Episodes Includes continued care and service providers with selected services from the active episodes listed below          Transportation Services  Private: Car    Final Discharge Disposition Code: 01 - home or self-care

## 2025-06-18 NOTE — OUTREACH NOTE
Call Center TCM Note      Flowsheet Row Responses   [facility] patient discharged from? [place]   Does the patient have one of the following disease processes/diagnoses(primary or secondary)? Other   TCM attempt successful? Yes   Call start time 0900   Call end time 0904   Discharge diagnosis Chest pain   Meds reviewed with patient/caregiver? Yes   Is the patient having any side effects they believe may be caused by any medication additions or changes? No   Does the patient have all medications ordered at discharge? Yes   Is the patient taking all medications as directed (includes completed medication regime)? Yes   Comments Hospital follow up with PCP 7/1.   Does the patient have an appointment with their PCP within 7-14 days of discharge? Yes   Psychosocial issues? No   Did the patient receive a copy of their discharge instructions? Yes   Nursing interventions Reviewed instructions with patient   What is the patient's perception of their health status since discharge? Improving   Is the patient/caregiver able to teach back signs and symptoms related to disease process for when to call PCP? Yes   Is the patient/caregiver able to teach back signs and symptoms related to disease process for when to call 911? Yes   Is the patient/caregiver able to teach back the hierarchy of who to call/visit for symptoms/problems? PCP, Specialist, Home health nurse, Urgent Care, ED, 911 Yes   If the patient is a current smoker, are they able to teach back resources for cessation? Not a smoker   TCM call completed? Yes   Wrap up additional comments Patient reports doing well. No questions or concerns at this time.   Call end time 0904   Would this patient benefit from a Referral to Amb Social Work? No   Is the patient interested in additional calls from an ambulatory ? No            ANDREW Fritz Registered Nurse    6/18/2025, 09:04 EDT

## 2025-06-19 ENCOUNTER — TELEPHONE (OUTPATIENT)
Dept: CASE MANAGEMENT | Facility: OTHER | Age: 80
End: 2025-06-19
Payer: MEDICARE

## 2025-06-19 NOTE — TELEPHONE ENCOUNTER
LDTRC . Thorough chart review completed, recent lipids reviewed all results stable and well managed.

## 2025-06-20 ENCOUNTER — TELEPHONE (OUTPATIENT)
Dept: CARDIOLOGY | Age: 80
End: 2025-06-20

## 2025-06-20 NOTE — TELEPHONE ENCOUNTER
Caller: Minnie Ross    Relationship to patient: Self    Best call back number: [phone]    Patient is needing: PT IS NEEDING TO SCHEDULE STRESS TEST. REF IN CHART. PLEASE CALL TO SCHEDULE

## 2025-06-25 ENCOUNTER — TELEPHONE (OUTPATIENT)
Dept: CARDIOLOGY | Age: 80
End: 2025-06-25
Payer: MEDICARE

## 2025-06-26 ENCOUNTER — HOSPITAL ENCOUNTER (OUTPATIENT)
Dept: CARDIOLOGY | Facility: HOSPITAL | Age: 80
Discharge: HOME | End: 2025-06-26
Admitting: NURSE PRACTITIONER
Payer: MEDICARE

## 2025-06-26 ENCOUNTER — PATIENT OUTREACH (OUTPATIENT)
Dept: CASE MANAGEMENT | Facility: OTHER | Age: 80
End: 2025-06-26
Payer: MEDICARE

## 2025-06-26 VITALS — BODY MASS INDEX: 31.12 KG/M2 | HEIGHT: 62 IN | WEIGHT: 169.09 LBS

## 2025-06-26 DIAGNOSIS — R07.9 NONSPECIFIC CHEST PAIN: ICD-10-CM

## 2025-06-26 LAB
BH CV NUCLEAR PRIOR STUDY: 2
BH CV REST NUCLEAR ISOTOPE DOSE: 10.9 MCI
BH CV STRESS BP STAGE 1: NORMAL
BH CV STRESS DURATION MIN STAGE 1: 6
BH CV STRESS DURATION SEC STAGE 1: 0
BH CV STRESS GRADE STAGE 1: 10
BH CV STRESS HR STAGE 1: 132
BH CV STRESS METS STAGE 1: 5
BH CV STRESS NUCLEAR ISOTOPE DOSE: 35.1 MCI
BH CV STRESS PROTOCOL 1: NORMAL
BH CV STRESS RECOVERY BP: NORMAL MMHG
BH CV STRESS RECOVERY HR: 77 BPM
BH CV STRESS SPEED STAGE 1: 1.7
BH CV STRESS STAGE 1: 1
MAXIMAL PREDICTED HEART RATE: 143 BPM
PERCENT MAX PREDICTED HR: 92.31 %
SPECT HRT GATED+EF W RNC IV: 80 %
STRESS BASELINE BP: NORMAL MMHG
STRESS BASELINE HR: 77 BPM
STRESS PERCENT HR: 109 %
STRESS POST ESTIMATED WORKLOAD: 5 METS
STRESS POST EXERCISE DUR MIN: 6 MIN
STRESS POST EXERCISE DUR SEC: 0 SEC
STRESS POST PEAK BP: NORMAL MMHG
STRESS POST PEAK HR: 132 BPM
STRESS TARGET HR: 122 BPM

## 2025-06-26 PROCEDURE — 34310000005 TECHNETIUM TETROFOSMIN KIT: Performed by: NURSE PRACTITIONER

## 2025-06-26 PROCEDURE — A9502 TC99M TETROFOSMIN: HCPCS | Performed by: NURSE PRACTITIONER

## 2025-06-26 PROCEDURE — 93017 CV STRESS TEST TRACING ONLY: CPT

## 2025-06-26 PROCEDURE — 78452 HT MUSCLE IMAGE SPECT MULT: CPT

## 2025-06-26 RX ADMIN — TETROFOSMIN 1 DOSE: 1.38 INJECTION, POWDER, LYOPHILIZED, FOR SOLUTION INTRAVENOUS at 09:34

## 2025-06-26 RX ADMIN — TETROFOSMIN 1 DOSE: 1.38 INJECTION, POWDER, LYOPHILIZED, FOR SOLUTION INTRAVENOUS at 08:37

## 2025-06-26 NOTE — OUTREACH NOTE
AMBULATORY CASE MANAGEMENT NOTE    Names and Relationships of Patient/Support Persons: Contact: Minnie Ross; Relationship: Self -     [facility] Interim Update    Spoke with patient this afternoon, verified by name.    ACM introduced self and purpose for call. - Declining ACM assistance at this time. Patient did accept ACM contact information.    States that Pantoprazole is working well. Denies chest heaviness, neck pain, N/V or indigestion.    Voicing that she does have a follow up appointment with PCP.    No further needs.  We will close at this time.        Education Documentation  No documentation found.        Lynn YUAN  Ambulatory Case Management    6/26/2025, 15:41 EDT

## 2025-06-30 NOTE — PROGRESS NOTES
"Subjective   Minnie Ross is a 79 y.o. female.     CC: Hospital F/U for Chest Pain    History of Present Illness     Patient returns today after being admitted to the [facility] observation unit on 6/16/2025 with this note:    Date of Admission: 6/16/2025   LOS: 0 days   PCP: Andriy Nielson MD     Final Diagnosis: Atypical chest pain     Hospital Outcome:      79-year-old female admitted with medical history significant for atrial fibrillation anticoagulated on Xarelto, IBS admitted to the observation unit for further management due to atypical chest pain.  Endorses heartburn.  She was previously on omeprazole but is not currently taking a PPI.     CMP and CBC largely unremarkable.  Cardiology was consulted and they have seen the patient.  High-sensitivity troponins have been negative x3, EKG was sinus rhythm, nonischemic.  Echocardiogram shows a normal ejection fraction, normal LV wall motion.  She denies chest pain currently.  We will trial a PPI.  Continue her current blood pressure medication regimen.  They will arrange for follow-up with them in the office in 1 to 2 weeks with outpatient stress test.    pantoprazole 40 MG EC tablet  Commonly known as: PROTONIX     40 mg, Oral, Every Early    DX: Atypical CP/GERD    Current outpatient and discharge medications have been reconciled for the patient.  Reviewed by: Andriy Nielson MD              The following portions of the patient's history were reviewed and updated as appropriate: allergies, current medications, past family history, past medical history, past social history, past surgical history, and problem list.    Review of Systems   Constitutional:  Negative for activity change, chills and fever.   Respiratory:  Negative for cough.    Cardiovascular:  Negative for chest pain.   Psychiatric/Behavioral:  Negative for dysphoric mood.      /74   Pulse 66   Temp 97.6 °F (36.4 °C) (Oral)   Resp 14   Ht 157.5 cm (62.01\")   Wt 76.7 kg (169 lb)  "  LMP  (LMP Unknown)   SpO2 99%   BMI 30.90 kg/m²     Objective   Physical Exam  Vitals and nursing note reviewed.   Constitutional:       General: She is not in acute distress.     Appearance: She is well-developed.   Cardiovascular:      Rate and Rhythm: Normal rate and regular rhythm.   Pulmonary:      Effort: Pulmonary effort is normal.      Breath sounds: Normal breath sounds.   Neurological:      Mental Status: She is alert and oriented to person, place, and time.   Psychiatric:         Behavior: Behavior normal.         Thought Content: Thought content normal.     Hospital records reviewed with pt confirming HPI.      Assessment & Plan   Diagnoses and all orders for this visit:    1. Atypical chest pain (Primary)    2. Gastroesophageal reflux disease without esophagitis  -     pantoprazole (PROTONIX) 40 MG EC tablet; Take 1 tablet by mouth Every Morning.  Dispense: 90 tablet; Refill: 3    3. Hospital discharge follow-up    4. Postmenopausal  -     DEXA Bone Density Axial; Future    Other orders  -     amoxicillin (AMOXIL) 500 MG capsule; Take 4 tabs on the way to the dentist  Dispense: 28 capsule; Refill: 11    Patient to keep cardiology follow-up. Pt did complete the Cardiolyte ST (reviewed by me at today's visit) and was WNL. Elevation of the head of bed and not eating within 2 hours of bedtime discussed.

## 2025-07-01 ENCOUNTER — OFFICE VISIT (OUTPATIENT)
Dept: FAMILY MEDICINE CLINIC | Facility: CLINIC | Age: 80
End: 2025-07-01
Payer: MEDICARE

## 2025-07-01 VITALS
BODY MASS INDEX: 31.1 KG/M2 | DIASTOLIC BLOOD PRESSURE: 74 MMHG | TEMPERATURE: 97.6 F | WEIGHT: 169 LBS | RESPIRATION RATE: 14 BRPM | HEIGHT: 62 IN | OXYGEN SATURATION: 99 % | SYSTOLIC BLOOD PRESSURE: 138 MMHG | HEART RATE: 66 BPM

## 2025-07-01 DIAGNOSIS — R07.89 ATYPICAL CHEST PAIN: Primary | ICD-10-CM

## 2025-07-01 DIAGNOSIS — K21.9 GASTROESOPHAGEAL REFLUX DISEASE WITHOUT ESOPHAGITIS: ICD-10-CM

## 2025-07-01 DIAGNOSIS — Z78.0 POSTMENOPAUSAL: ICD-10-CM

## 2025-07-01 DIAGNOSIS — Z09 HOSPITAL DISCHARGE FOLLOW-UP: ICD-10-CM

## 2025-07-01 PROCEDURE — 3075F SYST BP GE 130 - 139MM HG: CPT | Performed by: FAMILY MEDICINE

## 2025-07-01 PROCEDURE — 1159F MED LIST DOCD IN RCRD: CPT | Performed by: FAMILY MEDICINE

## 2025-07-01 PROCEDURE — 1126F AMNT PAIN NOTED NONE PRSNT: CPT | Performed by: FAMILY MEDICINE

## 2025-07-01 PROCEDURE — 3078F DIAST BP <80 MM HG: CPT | Performed by: FAMILY MEDICINE

## 2025-07-01 PROCEDURE — 1160F RVW MEDS BY RX/DR IN RCRD: CPT | Performed by: FAMILY MEDICINE

## 2025-07-01 PROCEDURE — 1111F DSCHRG MED/CURRENT MED MERGE: CPT | Performed by: FAMILY MEDICINE

## 2025-07-01 PROCEDURE — 99213 OFFICE O/P EST LOW 20 MIN: CPT | Performed by: FAMILY MEDICINE

## 2025-07-01 RX ORDER — CHOLECALCIFEROL (VITAMIN D3) 1250 MCG
CAPSULE ORAL
COMMUNITY

## 2025-07-01 RX ORDER — AMOXICILLIN 500 MG/1
CAPSULE ORAL
Qty: 28 CAPSULE | Refills: 11 | Status: SHIPPED | OUTPATIENT
Start: 2025-07-01

## 2025-07-01 RX ORDER — LORAZEPAM 0.5 MG
TABLET ORAL AS NEEDED
COMMUNITY
Start: 2025-05-16

## 2025-07-01 RX ORDER — MILK THISTLE 100 %
POWDER (GRAM) MISCELLANEOUS AS NEEDED
COMMUNITY
Start: 2025-05-16

## 2025-07-01 RX ORDER — VITC/E/ZINC/COPPER/LUTEIN/ZEAX 250 MG-200
TABLET,CHEWABLE ORAL
COMMUNITY

## 2025-07-01 RX ORDER — MULTIVITAMIN WITH IRON
250 TABLET ORAL DAILY
COMMUNITY
Start: 2025-05-16

## 2025-07-01 RX ORDER — PANTOPRAZOLE SODIUM 40 MG/1
40 TABLET, DELAYED RELEASE ORAL
Qty: 90 TABLET | Refills: 3 | Status: SHIPPED | OUTPATIENT
Start: 2025-07-01

## 2025-07-01 RX ORDER — ASCORBIC ACID 125 MG
TABLET,CHEWABLE ORAL
COMMUNITY

## 2025-07-08 ENCOUNTER — OFFICE VISIT (OUTPATIENT)
Age: 80
End: 2025-07-08
Payer: MEDICARE

## 2025-07-08 VITALS
HEART RATE: 69 BPM | HEIGHT: 62 IN | DIASTOLIC BLOOD PRESSURE: 80 MMHG | BODY MASS INDEX: 31.28 KG/M2 | WEIGHT: 170 LBS | SYSTOLIC BLOOD PRESSURE: 128 MMHG

## 2025-07-08 DIAGNOSIS — R07.9 CHEST PAIN, UNSPECIFIED TYPE: ICD-10-CM

## 2025-07-08 DIAGNOSIS — I10 PRIMARY HYPERTENSION: ICD-10-CM

## 2025-07-08 DIAGNOSIS — I48.3 TYPICAL ATRIAL FLUTTER: Primary | ICD-10-CM

## 2025-07-08 NOTE — PROGRESS NOTES
Date of Office Visit: 2025  Encounter Provider: GABRIELE Lucas  Place of Service: [facility] CARDIOLOGY  Patient Name: Minnie Ross  :1945    Chief Complaint   Patient presents with    Typical atrial flutter     HOSPITAL FOLLOW UP    :     HPI: Minnie Ross is a 79 y.o. female patient of with a history of atrial flutter (status post CTI ablation in ).  She follows with Dr. Dos Santos.    Last month, she presented to the ED with chest pain.  Her workup was unrevealing.  We were consulted for our opinion and felt the pain was atypical and consistent with indigestion.  An echocardiogram was completed demonstrating normal LV function and no significant valvular abnormalities.  She was scheduled for an outpatient stress test.  On , this demonstrated no evidence of ischemia.  She is here today for follow-up.    She is doing well.  She denies any recurrent chest pain since starting the pantoprazole.  She denies any shortness of breath, palpitations, edema, dizziness, syncope, bleeding difficulties or melena.    Past Medical History:   Diagnosis Date    Abnormal Pap smear of cervix     Arrhythmia     Arthritis     Atrial fibrillation     Atrial flutter     Bursitis of hip     Cataract     Cholelithiasis     Removal     H/O complete eye exam 2018    History of blood transfusion     History of bone density study 2018    Hypertension     IBS (irritable bowel syndrome)     Knee swelling     Numbness and tingling     Periarthritis of shoulder     Tear of meniscus of knee     Tremor     Visual impairment        Past Surgical History:   Procedure Laterality Date    ABLATION OF DYSRHYTHMIC FOCUS  2022    APPENDECTOMY  1960    CARDIAC ELECTROPHYSIOLOGY PROCEDURE N/A 2022    Procedure: Ablation atrial flutter;  Surgeon: Daniel Dos Santos MD;  Location: [facility] INVASIVE LOCATION;  Service: Cardiovascular;  Laterality: N/A;    CATARACT  EXTRACTION  2019    CHOLECYSTECTOMY  1975    COLONOSCOPY      ENDOMETRIAL ABLATION      JOINT REPLACEMENT  2010    KNEE SURGERY Right 2008    Homero Gaston MD    MAMMO BILATERAL  2018    MOUTH SURGERY  2013    PAP SMEAR  2016    PARTIAL KNEE ARTHROPLASTY  2010       Social History     Socioeconomic History    Marital status:      Spouse name: Julián    Number of children: 1   Tobacco Use    Smoking status: Former     Current packs/day: 0.00     Average packs/day: 1 pack/day for 25.0 years (25.0 ttl pk-yrs)     Types: Cigarettes     Start date: 1982     Quit date: 2007     Years since quittin.5     Passive exposure: Past    Smokeless tobacco: Never    Tobacco comments:     Caffeine - approximately 2 diet cokes a day   Vaping Use    Vaping status: Never Used   Substance and Sexual Activity    Alcohol use: Yes     Alcohol/week: 7.0 standard drinks of alcohol     Types: 7 Glasses of wine per week     Comment: 3-4 week    Drug use: No    Sexual activity: Not Currently     Partners: Male     Birth control/protection: Post-menopausal       Family History   Problem Relation Age of Onset    Hypertension Mother     Heart disease Mother     Arthritis Mother     Deep vein thrombosis Brother     Vision loss Father     Hyperlipidemia Sister        Review of Systems   Constitutional: Negative.   Cardiovascular: Negative.  Negative for chest pain, dyspnea on exertion, leg swelling, orthopnea, paroxysmal nocturnal dyspnea and syncope.   Respiratory: Negative.     Hematologic/Lymphatic: Negative for bleeding problem.   Musculoskeletal:  Negative for falls.   Gastrointestinal:  Negative for melena.   Neurological:  Negative for dizziness and light-headedness.       No Known Allergies      Current Outpatient Medications:     amLODIPine-benazepril (LOTREL 5-20) 5-20 MG per capsule, Take 1 capsule by mouth Daily., Disp: 90 capsule, Rfl: 3    amoxicillin (AMOXIL) 500 MG capsule, Take 4 tabs on the way to  "the dentist, Disp: 28 capsule, Rfl: 11    Cholecalciferol (Vitamin D3) 1.25 MG (31213 UT) capsule, Take  by mouth Every 7 (Seven) Days., Disp: , Rfl:     cyclobenzaprine (FLEXERIL) 10 MG tablet, Take 1 tablet by mouth Daily As Needed for Muscle Spasms., Disp: 30 tablet, Rfl: 2    Magnesium (RA Natural Magnesium) 250 MG tablet, Take 1 tablet by mouth Daily., Disp: , Rfl:     Menaquinone-7 (Vitamin K2) 100 MCG capsule, , Disp: , Rfl:     Milk Thistle 175 MG capsule, , Disp: , Rfl:     Misc Natural Products (Tart Cherry Advanced) capsule, As Needed., Disp: , Rfl:     Multiple Vitamins-Minerals (Systane ICaps AREDS2) capsule, , Disp: , Rfl:     pantoprazole (PROTONIX) 40 MG EC tablet, Take 1 tablet by mouth Every Morning., Disp: 90 tablet, Rfl: 3    rivaroxaban (XARELTO) 20 MG tablet, Take 1 tablet by mouth Daily., Disp: 14 tablet, Rfl: 0    TART CHERRY PO, Take 465 mg by mouth., Disp: , Rfl:     TURMERIC PO, Take  by mouth., Disp: , Rfl:       Objective:     Vitals:    07/08/25 1026   BP: 128/80   Pulse: 69   Weight: 77.1 kg (170 lb)   Height: 157.5 cm (62.01\")     Body mass index is 31.09 kg/m².    PHYSICAL EXAM:    Neck:      Vascular: No JVD.   Pulmonary:      Effort: Pulmonary effort is normal.      Breath sounds: Normal breath sounds.   Cardiovascular:      Normal rate. Regular rhythm.      Murmurs: There is no murmur.      No gallop.  No click. No rub.   Pulses:     Intact distal pulses.           ECG 12 Lead    Date/Time: 7/8/2025 10:42 AM  Performed by: Lorraine Mata APRN    Authorized by: Lorraine Mata APRN  Comparison: compared with previous ECG from 6/17/2025  Similar to previous ECG  Rhythm: sinus rhythm  Rate: normal  BPM: 69            Assessment:       Diagnosis Plan   1. Typical atrial flutter  ECG 12 Lead      2. Chest pain, unspecified type        3. Primary hypertension          Orders Placed This Encounter   Procedures    ECG 12 Lead     This order was created via procedure " documentation     Release to patient:   Routine Release [[id number]]          Plan:       1.  Atrial flutter.  Status post ablation.  She is maintaining sinus rhythm.  She is anticoagulated with Xarelto.      2.  Chest pain.  Noncardiac.  Improved with pantoprazole.  No further workup recommended.      3.  Hypertension.  Her blood pressure is stable.  Continue amlodipine.      I think she is doing well.  I am not making any changes.  She will follow-up with GABRIELE Solano as scheduled in October.      As always, it has been a pleasure to participate in your patient's care.      Sincerely,         GABRIELE Lopez

## 2025-07-19 ENCOUNTER — HOSPITAL ENCOUNTER (OUTPATIENT)
Dept: BONE DENSITY | Facility: HOSPITAL | Age: 80
Discharge: HOME | End: 2025-07-19
Payer: MEDICARE

## 2025-07-19 DIAGNOSIS — Z78.0 POSTMENOPAUSAL: ICD-10-CM

## 2025-07-19 PROCEDURE — 77080 DXA BONE DENSITY AXIAL: CPT

## (undated) DEVICE — Device: Brand: SOUNDSTAR

## (undated) DEVICE — LOU EP: Brand: MEDLINE INDUSTRIES, INC.

## (undated) DEVICE — PINNACLE INTRODUCER SHEATH: Brand: PINNACLE

## (undated) DEVICE — Device: Brand: ISMUS

## (undated) DEVICE — Device: Brand: REFERENCE PATCH CARTO 3

## (undated) DEVICE — Device: Brand: THERMOCOOL SMARTTOUCH SF

## (undated) DEVICE — Device: Brand: SMARTABLATE

## (undated) DEVICE — Device: Brand: WEBSTER CS